# Patient Record
Sex: FEMALE | Race: WHITE | NOT HISPANIC OR LATINO | Employment: OTHER | URBAN - METROPOLITAN AREA
[De-identification: names, ages, dates, MRNs, and addresses within clinical notes are randomized per-mention and may not be internally consistent; named-entity substitution may affect disease eponyms.]

---

## 2017-01-05 ENCOUNTER — LAB VISIT (OUTPATIENT)
Dept: LAB | Facility: HOSPITAL | Age: 52
End: 2017-01-05
Attending: INTERNAL MEDICINE
Payer: COMMERCIAL

## 2017-01-05 DIAGNOSIS — M32.9 SLE (SYSTEMIC LUPUS ERYTHEMATOSUS): ICD-10-CM

## 2017-01-05 LAB
ALBUMIN SERPL BCP-MCNC: 3.6 G/DL
ALP SERPL-CCNC: 76 U/L
ALT SERPL W/O P-5'-P-CCNC: 24 U/L
ANION GAP SERPL CALC-SCNC: 12 MMOL/L
AST SERPL-CCNC: 23 U/L
BASOPHILS # BLD AUTO: 0.02 K/UL
BASOPHILS NFR BLD: 0.6 %
BILIRUB SERPL-MCNC: 0.5 MG/DL
BUN SERPL-MCNC: 19 MG/DL
C3 SERPL-MCNC: 106 MG/DL
C4 SERPL-MCNC: 12 MG/DL
CALCIUM SERPL-MCNC: 9 MG/DL
CHLORIDE SERPL-SCNC: 102 MMOL/L
CO2 SERPL-SCNC: 26 MMOL/L
CREAT SERPL-MCNC: 0.9 MG/DL
CRP SERPL-MCNC: 1.5 MG/L
DIFFERENTIAL METHOD: ABNORMAL
EOSINOPHIL # BLD AUTO: 0 K/UL
EOSINOPHIL NFR BLD: 0.3 %
ERYTHROCYTE [DISTWIDTH] IN BLOOD BY AUTOMATED COUNT: 13.4 %
ERYTHROCYTE [SEDIMENTATION RATE] IN BLOOD BY WESTERGREN METHOD: 60 MM/HR
EST. GFR  (AFRICAN AMERICAN): >60 ML/MIN/1.73 M^2
EST. GFR  (NON AFRICAN AMERICAN): >60 ML/MIN/1.73 M^2
GLUCOSE SERPL-MCNC: 103 MG/DL
HCT VFR BLD AUTO: 38.4 %
HGB BLD-MCNC: 13.5 G/DL
LYMPHOCYTES # BLD AUTO: 0.9 K/UL
LYMPHOCYTES NFR BLD: 27.3 %
MCH RBC QN AUTO: 31.7 PG
MCHC RBC AUTO-ENTMCNC: 35.2 %
MCV RBC AUTO: 90 FL
MONOCYTES # BLD AUTO: 0.4 K/UL
MONOCYTES NFR BLD: 12.6 %
NEUTROPHILS # BLD AUTO: 1.9 K/UL
NEUTROPHILS NFR BLD: 58.9 %
PLATELET # BLD AUTO: 201 K/UL
PMV BLD AUTO: 10.3 FL
POTASSIUM SERPL-SCNC: 3.7 MMOL/L
PROT SERPL-MCNC: 7.4 G/DL
RBC # BLD AUTO: 4.26 M/UL
SODIUM SERPL-SCNC: 140 MMOL/L
WBC # BLD AUTO: 3.26 K/UL

## 2017-01-05 PROCEDURE — 86140 C-REACTIVE PROTEIN: CPT

## 2017-01-05 PROCEDURE — 85651 RBC SED RATE NONAUTOMATED: CPT

## 2017-01-05 PROCEDURE — 36415 COLL VENOUS BLD VENIPUNCTURE: CPT | Mod: PO

## 2017-01-05 PROCEDURE — 86160 COMPLEMENT ANTIGEN: CPT

## 2017-01-05 PROCEDURE — 85025 COMPLETE CBC W/AUTO DIFF WBC: CPT

## 2017-01-05 PROCEDURE — 86160 COMPLEMENT ANTIGEN: CPT | Mod: 59

## 2017-01-05 PROCEDURE — 80053 COMPREHEN METABOLIC PANEL: CPT

## 2017-01-05 PROCEDURE — 86225 DNA ANTIBODY NATIVE: CPT

## 2017-01-06 LAB — DSDNA AB SER-ACNC: NORMAL [IU]/ML

## 2017-01-10 ENCOUNTER — OFFICE VISIT (OUTPATIENT)
Dept: RHEUMATOLOGY | Facility: CLINIC | Age: 52
End: 2017-01-10
Payer: COMMERCIAL

## 2017-01-10 ENCOUNTER — OFFICE VISIT (OUTPATIENT)
Dept: DERMATOLOGY | Facility: CLINIC | Age: 52
End: 2017-01-10
Payer: COMMERCIAL

## 2017-01-10 ENCOUNTER — CLINICAL SUPPORT (OUTPATIENT)
Dept: INFECTIOUS DISEASES | Facility: CLINIC | Age: 52
End: 2017-01-10
Payer: COMMERCIAL

## 2017-01-10 VITALS
SYSTOLIC BLOOD PRESSURE: 93 MMHG | HEART RATE: 61 BPM | WEIGHT: 128 LBS | BODY MASS INDEX: 21.85 KG/M2 | HEIGHT: 64 IN | DIASTOLIC BLOOD PRESSURE: 60 MMHG

## 2017-01-10 DIAGNOSIS — L71.9 ROSACEA: Primary | ICD-10-CM

## 2017-01-10 DIAGNOSIS — M32.9 SYSTEMIC LUPUS ERYTHEMATOSUS, UNSPECIFIED SLE TYPE, UNSPECIFIED ORGAN INVOLVEMENT STATUS: ICD-10-CM

## 2017-01-10 DIAGNOSIS — L90.5 SCAR: ICD-10-CM

## 2017-01-10 DIAGNOSIS — Z79.631 METHOTREXATE, LONG TERM, CURRENT USE: ICD-10-CM

## 2017-01-10 DIAGNOSIS — E78.5 HYPERLIPIDEMIA, UNSPECIFIED HYPERLIPIDEMIA TYPE: Primary | ICD-10-CM

## 2017-01-10 DIAGNOSIS — M32.8 OTHER FORMS OF SYSTEMIC LUPUS ERYTHEMATOSUS, UNSPECIFIED ORGAN INVOLVEMENT STATUS: ICD-10-CM

## 2017-01-10 DIAGNOSIS — D48.5 NEOPLASM OF UNCERTAIN BEHAVIOR OF SKIN: ICD-10-CM

## 2017-01-10 PROCEDURE — 99214 OFFICE O/P EST MOD 30 MIN: CPT | Mod: S$GLB,,, | Performed by: INTERNAL MEDICINE

## 2017-01-10 PROCEDURE — 99999 PR PBB SHADOW E&M-EST. PATIENT-LVL III: CPT | Mod: PBBFAC,,, | Performed by: INTERNAL MEDICINE

## 2017-01-10 PROCEDURE — 90732 PPSV23 VACC 2 YRS+ SUBQ/IM: CPT | Mod: S$GLB,,, | Performed by: INTERNAL MEDICINE

## 2017-01-10 PROCEDURE — 99213 OFFICE O/P EST LOW 20 MIN: CPT | Mod: S$GLB,,, | Performed by: DERMATOLOGY

## 2017-01-10 PROCEDURE — 99999 PR PBB SHADOW E&M-EST. PATIENT-LVL I: CPT | Mod: PBBFAC,,,

## 2017-01-10 PROCEDURE — 1159F MED LIST DOCD IN RCRD: CPT | Mod: S$GLB,,, | Performed by: DERMATOLOGY

## 2017-01-10 PROCEDURE — 1159F MED LIST DOCD IN RCRD: CPT | Mod: S$GLB,,, | Performed by: INTERNAL MEDICINE

## 2017-01-10 PROCEDURE — 90686 IIV4 VACC NO PRSV 0.5 ML IM: CPT | Mod: S$GLB,,, | Performed by: INTERNAL MEDICINE

## 2017-01-10 PROCEDURE — 90471 IMMUNIZATION ADMIN: CPT | Mod: S$GLB,,, | Performed by: INTERNAL MEDICINE

## 2017-01-10 PROCEDURE — 90472 IMMUNIZATION ADMIN EACH ADD: CPT | Mod: S$GLB,,, | Performed by: INTERNAL MEDICINE

## 2017-01-10 PROCEDURE — 99999 PR PBB SHADOW E&M-EST. PATIENT-LVL III: CPT | Mod: PBBFAC,,, | Performed by: DERMATOLOGY

## 2017-01-10 RX ORDER — FOLIC ACID 1 MG/1
2 TABLET ORAL DAILY
Qty: 60 TABLET | Refills: 11 | Status: SHIPPED | OUTPATIENT
Start: 2017-01-10 | End: 2017-10-17

## 2017-01-10 RX ORDER — METHOTREXATE 2.5 MG/1
TABLET ORAL
Qty: 48 TABLET | Refills: 0 | Status: SHIPPED | OUTPATIENT
Start: 2017-01-10 | End: 2017-06-25 | Stop reason: SDUPTHER

## 2017-01-10 RX ORDER — SULFACETAMIDE SODIUM, SULFUR 98; 48 MG/G; MG/G
1 CREAM TOPICAL DAILY
Qty: 57 G | Refills: 1 | Status: SHIPPED | OUTPATIENT
Start: 2017-01-10 | End: 2018-01-26

## 2017-01-10 RX ORDER — METRONIDAZOLE 7.5 MG/G
CREAM TOPICAL
Refills: 2 | COMMUNITY
Start: 2016-12-07 | End: 2019-01-24

## 2017-01-10 RX ORDER — DOXYCYCLINE HYCLATE 50 MG/1
50 CAPSULE ORAL DAILY
Qty: 30 CAPSULE | Refills: 1 | Status: SHIPPED | OUTPATIENT
Start: 2017-01-10 | End: 2017-05-17 | Stop reason: SDUPTHER

## 2017-01-10 ASSESSMENT — ROUTINE ASSESSMENT OF PATIENT INDEX DATA (RAPID3)
PAIN SCORE: 0
PATIENT GLOBAL ASSESSMENT SCORE: 0
FATIGUE SCORE: 0
MDHAQ FUNCTION SCORE: 0
TOTAL RAPID3 SCORE: 0
PSYCHOLOGICAL DISTRESS SCORE: 0
AM STIFFNESS SCORE: 0, NO

## 2017-01-10 ASSESSMENT — SYSTEMIC LUPUS ERYTHEMATOSUS DISEASE ACTIVITY INDEX (SLEDAI): TOTAL_SCORE: 0

## 2017-01-10 NOTE — MR AVS SNAPSHOT
[facility] - Dermatology  [address] [facility] [place] [address]  Phone: [phone]  Fax: [phone]                  Madeleine Nunez   1/10/2017 11:30 AM   Office Visit    Descripción:  Female : 1965   Personal Médico:  Ivonne Thomas MD   Departamento:  [facility] - Dermatology           Razón de la janee     Rosacea     Rash           Diagnósticos de Esta Visita        Comentarios    Rosacea    -  Primario     Neoplasm of uncertain behavior of skin                Lista de tareas           Citas próximas        Personal Médico Departamento Tfno del dpto    1/10/2017 1:20 PM INJECTION, INFECTIOUS DISEASES [facility] ID Injection Room [phone]    1/10/2017 1:40 PM INJECTION, INFECTIOUS DISEASES [facility] ID Injection Room [phone]    2017 7:45 AM Sea Rosen MD [facility] OB/ -[phone]    2/10/2017 8:10 AM [facility] [place] [facility] [place] [phone]    2017 10:30 AM Ivonne Thomas MD [facility] Dermatology [phone]      Metas (5 Years of Data)     Ninguna      Follow-Up and Disposition     Return in about 6 weeks (around 2017).      Recetas para recoger        Disp Refills Start End    sulfacetamide sodium-sulfur 9.8-4.8 % Crea 57 g 1 1/10/2017     Apply 1 inch topically once daily. - Topical (Top)    [facility]: [id number] [place] [address] AT [facility] No. de tlfo: #: [phone]       doxycycline (VIBRAMYCIN) 50 MG capsule 30 capsule 1 1/10/2017     Take 1 capsule (50 mg total) by mouth once daily. - Oral    [id number] [place] [address] AT [facility] No. de tlfo: #: [phone]         [facility] en [facility]     [facility] [place] [place] de Enfermeras - Asistencia   Enfermeras registradas de [facility] pueden ayudarle a reservar franky janee, proveer educación para la yanni, asesoría clínica, y otros servicios de asesoramiento.   Llame para jorge luis servicio gratuito a  [phone].             Medicamentos           Mensaje sobre Medicamentos     Verificar los cambios y / o adiciones a santana régimen de medicación son los mismos que discutir con santana médico. Si cualquiera de estos cambios o adiciones son incorrectos, por favor notifique a santana proveedor de atención médica.        EMPEZAR a tenzin estos medicamentos NUEVOS        Refills    sulfacetamide sodium-sulfur 9.8-4.8 % Crea 1    Sig: Apply 1 inch topically once daily.    Categoría: Normal    Vía: Topical (Top)    doxycycline (VIBRAMYCIN) 50 MG capsule 1    Sig: Take 1 capsule (50 mg total) by mouth once daily.    Categoría: Normal    Vía: Oral           Verifique que la siguiente lista de medicamentos es franky representación exacta de los medicamentos que está tomando actualmente. Si no hay ningunos reportados, la lista puede estar en knutson. Si no es correcta, por favor póngase en contacto con santana proveedor de atención médica. Lleve esta lista con usted en alexandria de emergencia.           Medicamentos Actuales     azelastine (ASTELIN) 137 mcg (0.1 %) nasal spray 2 sprays by Nasal route 2 (two) times daily as needed.     estrogen, conjugated,-medroxyprogesterone 0.3-1.5 mg (PREMPRO) 0.3-1.5 mg per tablet Take 1 tablet by mouth once daily.    fluocinolone acetonide oil (DERMOTIC OIL) 0.01 % Drop Place 5 drops in ear(s) daily as needed.     fluticasone (FLONASE) 50 mcg/actuation nasal spray 2 sprays by Each Nare route once daily.    folic acid (FOLVITE) 1 MG tablet Take 2 tablets (2 mg total) by mouth once daily.    ipratropium (ATROVENT) 0.06 % nasal spray 2 sprays by Nasal route 3 (three) times daily.    levothyroxine (SYNTHROID) 75 MCG tablet Take 75 mcg by mouth once daily.    methotrexate 2.5 MG Tab TAKE 4 TABLETS (12.5 MG TOTAL) BY MOUTH EVERY 7 DAYS.    nitrofurantoin (MACRODANTIN) 50 MG capsule every other day.     triamterene-hydrochlorothiazide 50-25 mg (DYAZIDE) 50-25 mg Cap Take 1 capsule by mouth once daily.    doxycycline  (VIBRAMYCIN) 50 MG capsule Take 1 capsule (50 mg total) by mouth once daily.    metronidazole 0.75% (METROCREAM) 0.75 % Crea APPLY TOPICALLY TO FACE TWICE DAILY    potassium citrate (UROCIT-K) 10 mEq TbSR TAKE 2 TABLETS (20 MEQ TOTAL) BY MOUTH ONCE DAILY.    sulfacetamide sodium-sulfur 9.8-4.8 % Crea Apply 1 inch topically once daily.           Información de referencia clínica           Signos vitales - más recientes     LMP (última renetta)                   2013           Alergias     A partir del:  1/10/2017        Sulfa (Sulfonamide Antibiotics)    Sulfamethoxazole-trimethoprim      Vacunas     Administradas en la fecha de la visita:  1/10/2017        Nombre Fecha Dosis Fecha del VIS Vía    Pneumococcal Polysaccharide - 23 Valent  Deferred   -- -- --    influenza - Quadrivalent - PF (ADULT)  Deferred   -- -- --               Madeleine Nunez   1/10/2017 11:30 AM   Office Visit    Description:  Female : 1965   Provider:  Ivonne Thomas MD   Department:  [facility] - Dermatology           Reason for Visit     Rosacea     Rash           Diagnoses this Visit        Comments    Rosacea    -  Primary     Neoplasm of uncertain behavior of skin                To Do List           Future Appointments        Provider Department Dept Phone    1/10/2017 1:20 PM INJECTION, INFECTIOUS DISEASES [facility] Injection Room [phone]    1/10/2017 1:40 PM INJECTION, INFECTIOUS DISEASES [facility] Injection Room [phone]    2017 7:45 AM Sea Rosen MD [facility] OB/ -[phone]    2/10/2017 8:10 AM [facility] [place] [facility] [place] [phone]    2017 10:30 AM [facility] [phone]      Goals     None      Follow-Up and Disposition     Return in about 6 weeks (around 2017).       These Medications        Disp Refills Start End    sulfacetamide sodium-sulfur 9.8-4.8 % Crea 57 g 1 1/10/2017     Apply 1 inch topically once  daily. - Topical (Top)    Pharmacy: [id number] - [place] - [address] [facility] Ph #: [phone]       doxycycline (VIBRAMYCIN) 50 MG capsule 30 capsule 1 1/10/2017     Take 1 capsule (50 mg total) by mouth once daily. - Oral    Pharmacy: [id number] - [place] - [address] [facility] Ph #: [phone]         [facility] On [facility]     [facility] On Call Nurse Care Line - 24/7 Assistance  Registered nurses in the [facility] On Call Center provide clinical advisement, health education, appointment booking, and other advisory services.  Call for this free service at [phone].             Medications           Message regarding Medications     Verify the changes and/or additions to your medication regime listed below are the same as discussed with your clinician today.  If any of these changes or additions are incorrect, please notify your healthcare provider.        START taking these NEW medications        Refills    sulfacetamide sodium-sulfur 9.8-4.8 % Crea 1    Sig: Apply 1 inch topically once daily.    Class: Normal    Route: Topical (Top)    doxycycline (VIBRAMYCIN) 50 MG capsule 1    Sig: Take 1 capsule (50 mg total) by mouth once daily.    Class: Normal    Route: Oral           Verify that the below list of medications is an accurate representation of the medications you are currently taking.  If none reported, the list may be blank. If incorrect, please contact your healthcare provider. Carry this list with you in case of emergency.           Current Medications     azelastine (ASTELIN) 137 mcg (0.1 %) nasal spray 2 sprays by Nasal route 2 (two) times daily as needed.     estrogen, conjugated,-medroxyprogesterone 0.3-1.5 mg (PREMPRO) 0.3-1.5 mg per tablet Take 1 tablet by mouth once daily.    fluocinolone acetonide oil (DERMOTIC OIL) 0.01 % Drop Place 5 drops in ear(s) daily as needed.     fluticasone (FLONASE) 50 mcg/actuation nasal spray 2 sprays by Each  Nare route once daily.    folic acid (FOLVITE) 1 MG tablet Take 2 tablets (2 mg total) by mouth once daily.    ipratropium (ATROVENT) 0.06 % nasal spray 2 sprays by Nasal route 3 (three) times daily.    levothyroxine (SYNTHROID) 75 MCG tablet Take 75 mcg by mouth once daily.    methotrexate 2.5 MG Tab TAKE 4 TABLETS (12.5 MG TOTAL) BY MOUTH EVERY 7 DAYS.    nitrofurantoin (MACRODANTIN) 50 MG capsule every other day.     triamterene-hydrochlorothiazide 50-25 mg (DYAZIDE) 50-25 mg Cap Take 1 capsule by mouth once daily.    doxycycline (VIBRAMYCIN) 50 MG capsule Take 1 capsule (50 mg total) by mouth once daily.    metronidazole 0.75% (METROCREAM) 0.75 % Crea APPLY TOPICALLY TO FACE TWICE DAILY    potassium citrate (UROCIT-K) 10 mEq TbSR TAKE 2 TABLETS (20 MEQ TOTAL) BY MOUTH ONCE DAILY.    sulfacetamide sodium-sulfur 9.8-4.8 % Crea Apply 1 inch topically once daily.           Clinical Reference Information           Vital Signs - Last Recorded     LMP                   08/18/2013           Allergies as of 1/10/2017     Sulfa (Sulfonamide Antibiotics)    Sulfamethoxazole-trimethoprim      Immunizations Administered on Date of Encounter - 1/10/2017     Name Date Dose VIS Date Route    Pneumococcal Polysaccharide - 23 Valent  Deferred   -- -- --    influenza - Quadrivalent - PF (ADULT)  Deferred   -- -- --

## 2017-01-10 NOTE — PROGRESS NOTES
"Subjective:       Patient ID: Madeleine Nunez is a 51 y.o. female.    Chief Complaint: SLE    HPI asymptomatic. SLEDAI=0. Has occ palpitations, saw Dr. Sifuentes rec beta blocker she deferred. Exercises vigorously 5/7 days without difficulty. Didn't get flu vaccine this year. Seeing her gyn, had cervicitis which was biopsied. May explain the asymptomatic pyuria, though pt doesn't note vag discharge. No urinary symptoms whatsoever.  Review of Systems   Constitutional: Negative for appetite change, fatigue, fever and unexpected weight change.   HENT: Negative for mouth sores.    Eyes: Negative for visual disturbance.   Respiratory: Negative for cough, shortness of breath and wheezing.    Cardiovascular: Negative for chest pain and palpitations.   Gastrointestinal: Negative for abdominal pain, anal bleeding, blood in stool, constipation, diarrhea, nausea and vomiting.   Genitourinary: Negative for dysuria, frequency and urgency.   Musculoskeletal: Negative for arthralgias, back pain, gait problem, joint swelling, myalgias, neck pain and neck stiffness.   Skin: Negative for rash.   Neurological: Negative for weakness, numbness and headaches.   Hematological: Negative for adenopathy. Does not bruise/bleed easily.   Psychiatric/Behavioral: Negative for sleep disturbance. The patient is not nervous/anxious.          Objective:     Visit Vitals    BP 93/60    Pulse 61    Ht 5' 3.6" (1.615 m)    Wt 58.1 kg (128 lb)    LMP 08/18/2013    BMI 22.25 kg/m2        Physical Exam   Constitutional: She is oriented to person, place, and time and well-developed, well-nourished, and in no distress.   HENT:   Head: Normocephalic and atraumatic.   Mouth/Throat: Oropharynx is clear and moist.   Eyes: Conjunctivae and EOM are normal.   Neck: Normal range of motion. Neck supple. No thyromegaly present.   Cardiovascular: Normal rate, regular rhythm, normal heart sounds and intact distal pulses.  Exam reveals no gallop and no friction rub.  "   No murmur heard.  Pulmonary/Chest: Breath sounds normal. She has no wheezes. She has no rales. She exhibits no tenderness.   Abdominal: Soft. She exhibits no distension and no mass. There is no tenderness.       Right Side Rheumatological Exam     Examination finds the shoulder, elbow, wrist, knee, 1st PIP, 1st MCP, 2nd PIP, 2nd MCP, 3rd PIP, 3rd MCP, 4th PIP, 4th MCP, 5th PIP and 5th MCP normal.    Left Side Rheumatological Exam     Examination finds the shoulder, elbow, wrist, knee, 1st PIP, 1st MCP, 2nd PIP, 2nd MCP, 3rd PIP, 3rd MCP, 4th PIP, 4th MCP, 5th PIP and 5th MCP normal.      Lymphadenopathy:     She has no cervical adenopathy.   Neurological: She is alert and oriented to person, place, and time. She displays normal reflexes. Gait normal.   Nl motor strength UE and LE bilateral   Musculoskeletal: She exhibits no edema.       Results for HUBERT RODRIGUEZ (MRN [id number]) as of 1/10/2017 11:08   Ref. Range 1/5/2017 13:29 1/5/2017 13:40   WBC Latest Ref Range: 3.90 - 12.70 K/uL  3.26 (L)   RBC Latest Ref Range: 4.00 - 5.40 M/uL  4.26   Hemoglobin Latest Ref Range: 12.0 - 16.0 g/dL  13.5   Hematocrit Latest Ref Range: 37.0 - 48.5 %  38.4   MCV Latest Ref Range: 82 - 98 fL  90   MCH Latest Ref Range: 27.0 - 31.0 pg  31.7 (H)   MCHC Latest Ref Range: 32.0 - 36.0 %  35.2   RDW Latest Ref Range: 11.5 - 14.5 %  13.4   Platelets Latest Ref Range: 150 - 350 K/uL  201   MPV Latest Ref Range: 9.2 - 12.9 fL  10.3   Gran% Latest Ref Range: 38.0 - 73.0 %  58.9   Gran # Latest Ref Range: 1.8 - 7.7 K/uL  1.9   Lymph% Latest Ref Range: 18.0 - 48.0 %  27.3   Lymph # Latest Ref Range: 1.0 - 4.8 K/uL  0.9 (L)   Mono% Latest Ref Range: 4.0 - 15.0 %  12.6   Mono # Latest Ref Range: 0.3 - 1.0 K/uL  0.4   Eosinophil% Latest Ref Range: 0.0 - 8.0 %  0.3   Eos # Latest Ref Range: 0.0 - 0.5 K/uL  0.0   Basophil% Latest Ref Range: 0.0 - 1.9 %  0.6   Baso # Latest Ref Range: 0.00 - 0.20 K/uL  0.02   Sed Rate Latest Ref Range: 0 - 20  mm/Hr  60 (H)   Sodium Latest Ref Range: 136 - 145 mmol/L  140   Potassium Latest Ref Range: 3.5 - 5.1 mmol/L  3.7   Chloride Latest Ref Range: 95 - 110 mmol/L  102   CO2 Latest Ref Range: 23 - 29 mmol/L  26   Anion Gap Latest Ref Range: 8 - 16 mmol/L  12   BUN, Bld Latest Ref Range: 6 - 20 mg/dL  19   Creatinine Latest Ref Range: 0.5 - 1.4 mg/dL  0.9   eGFR if non African American Latest Ref Range: >60 mL/min/1.73 m^2  >60.0   eGFR if African American Latest Ref Range: >60 mL/min/1.73 m^2  >60.0   Glucose Latest Ref Range: 70 - 110 mg/dL  103   Calcium Latest Ref Range: 8.7 - 10.5 mg/dL  9.0   Alkaline Phosphatase Latest Ref Range: 55 - 135 U/L  76   Total Protein Latest Ref Range: 6.0 - 8.4 g/dL  7.4   Albumin Latest Ref Range: 3.5 - 5.2 g/dL  3.6   Total Bilirubin Latest Ref Range: 0.1 - 1.0 mg/dL  0.5   AST Latest Ref Range: 10 - 40 U/L  23   ALT Latest Ref Range: 10 - 44 U/L  24   CRP Latest Ref Range: 0.0 - 8.2 mg/L  1.5   ds DNA Ab Latest Ref Range: Negative 1:10   Negative 1:10   Complement (C-3) Latest Ref Range: 50 - 180 mg/dL  106   Complement (C-4) Latest Ref Range: 11 - 44 mg/dL  12   Specimen UA Unknown Urine, Clean Catch    Color, UA Latest Ref Range: Yellow, Straw, Yvonne  Yellow    pH, UA Latest Ref Range: 5.0 - 8.0  7.0    Specific Gravity, UA Latest Ref Range: 1.005 - 1.030  1.020    Appearance, UA Latest Ref Range: Clear  Clear    Protein, UA Latest Ref Range: Negative  Trace (A)    Glucose, UA Latest Ref Range: Negative  Negative    Ketones, UA Latest Ref Range: Negative  Negative    Occult Blood UA Latest Ref Range: Negative  Negative    Nitrite, UA Latest Ref Range: Negative  Negative    Urobilinogen, UA Latest Ref Range: <2.0 EU/dL Negative    Bilirubin (UA) Latest Ref Range: Negative  Negative    Leukocytes, UA Latest Ref Range: Negative  3+ (A)    RBC, UA Latest Ref Range: 0 - 4 /hpf 1    WBC, UA Latest Ref Range: 0 - 5 /hpf 18 (H)    Bacteria, UA Latest Ref Range: None-Occ /hpf Rare     Squam Epithel, UA Latest Units: /hpf 6    Microscopic Comment Unknown SEE COMMENT    Prot/Creat Ratio, Ur Latest Ref Range: 0.00 - 0.20  0.07    Protein, Urine Random Latest Ref Range: 0 - 15 mg/dL 13    Creatinine, Random Ur Latest Ref Range: 15.0 - 325.0 mg/dL 199.0    Differential Method Unknown  Automated     The bone density is normal. RJQ   External Result Report   External Result Report   Narrative   : 1965 ORDERING PHYSICIAN: ANTONIA Bender LOCATION: [facility]    HISTORY: 49 y/o female with no hx of fractures. She had menopausal symptoms at 47 y/o. She is taking Estrogen and 400 units of Vit D supplements. She has a hx of Kidney Stones and Lupus. She exercises daily and does not smoke.    TECHNIQUE: Bone Mineral Density performed using [employer] [place] (S/N [id number]) reveals good positioning of lumbar spine and hip.    BONE MINERAL DENSITY RESULTS:  Lumbar Spine: Lumbar bone mineral density L1-L4 is 0.982g/cm2, which is a t-score of -0.6. The z-score is 0.1.    Total Hip: The total hip bone mineral density is 1.012g/cm2.  The t-score is 0.6, and the z-score is 1.0.  Femoral neck BMD is 0.822g/cm2 and the t-score is -0.2.    COMPARISONS:  Date Location BMD T-score  12 L-spine 0.974 -0.7  Total Hip 1.015 0.6   Impression     Normal spine and hip BMD. Total hip and lumbar spine BMD unchanged since  and .     Recommendations:  1) Adequate calcium and Vitamin D therapy  2) Appropriate exercise  3) Weigh benefits and risks of homonal therapy.  4) Consider repeat BMD in perhaps 4 years.      EXPLANATION OF RESULTS:  The t-score compares this results to the bone density of a 25 year old of the same gender. The z-score compares this result to the average bone density to people of the same age and gender. The amounts indicate the number of standard deviations above or below the mean.  * Osteoporosis is generally defined as having a t-score between less than -2.5.  * Osteopenia is generally  defined as having a t-score between -1 and -2.5.  * The normal range is generally defined as having a t-score between -1 to 1.      Electronically signed by: MARQUEZ LOPEZ MD  Date: 10/13/15  Time: 14:16     Encounter   View Encounter      Reviewed By   Wander Bender MD on 10/13/2015  3:23 PM     Results for HUBERT RODRIGUEZ (MRN [id number]) as of 1/10/2017 11:08   Ref. Range 4/16/2015 08:45   Cholesterol Latest Ref Range: 120 - 199 mg/dL 129   HDL Latest Ref Range: 40 - 75 mg/dL 38 (L)   LDL Cholesterol Latest Ref Range: 63.0 - 159.0 mg/dL 83.6   Total Cholesterol/HDL Ratio Latest Ref Range: 2.0 - 5.0  3.4   Triglycerides Latest Ref Range: 30 - 150 mg/dL 37      Results for HUBERT RODRIGUEZ (MRN [id number]) as of 1/10/2017 11:28   Ref. Range 7/7/2016 11:59   Mitogen - Nil Latest Ref Range: See text IU/mL >10.00   NIL Latest Ref Range: See text IU/mL 0.05   TB Antigen Latest Ref Range: See text IU/mL 0.18   TB Antigen - Nil Latest Ref Range: See text IU/mL 0.12   TB Gold Unknown Negative     Assessment:     SLE SLEDAI=0  Asymptomatic sterile pyuria ? Due to cervicitis  Lymphopenia, mild   Meniere's  hypotension       Plan:   Decrease methotrexate to 10mg once a week with folic acid 2mg daily  Repeat cbc, lipid panel in 4 wks  *influenza vaccine and Pneumovax today  Should speak with her ENT about reducing dose of Dyazide perhaps to 50 /12.5mg triam/ HCTZ  RTC 6 months with standing lupus labs

## 2017-01-10 NOTE — MR AVS SNAPSHOT
[facility] - Rheumatology  [address]  [facility] [address]  Phone: [phone]  Fax: [phone]                  Madeleine CROWELL Enrique   1/10/2017 10:30 AM   Office Visit    Descripción:  Female : 1965   Personal Médico:  Wander Bender MD   [facility]           Diagnósticos de Esta Visita        Comentarios    Hyperlipidemia, unspecified hyperlipidemia type    -  Primario     Systemic lupus erythematosus, unspecified SLE type, unspecified organ involvement status         Other forms of systemic lupus erythematosus, unspecified organ involvement status         Methotrexate, long term, current use                Lista de tareas           Citas próximas        Personal Médico Departamento Tfno del dpto    1/10/2017 11:30 AM Ivonne Thomas MD [facility] - Dermatology [phone]    2017 7:45 AM Sea Rosen MD [facility] - OB/ -[phone]      Metas (5 Years of Data)     Ninguna      Follow-Up and Disposition     Return in about 3 months (around 4/10/2017).      Recetas para recoger        Disp Refills Start End    methotrexate 2.5 MG Tab 48 tablet 0 1/10/2017     TAKE 4 TABLETS (12.5 MG TOTAL) BY MOUTH EVERY 7 DAYS.    [facility]: [facility]/pharmacy #[id number] - [place] - [address] AT [facility] No. de tlfo: #: [phone]       folic acid (FOLVITE) 1 MG tablet 60 tablet 11 1/10/2017     Take 2 tablets (2 mg total) by mouth once daily. - Oral    [facility]: [facility]/pharmacy #[id number] - [place] - [address] AT [facility] No. de tlfo: #: [phone]         [facility] en Llamada     [facility] [place] Línea de Enfermeras - Asistencia   Enfermeras registradas de [facility] pueden ayudarle a reservar franky janee, proveer educación para la yanni, asesoría clínica, y otros servicios de asesoramiento.   Llame para jorge luis servicio gratuito a [phone].             Medicamentos           Mensaje sobre Medicamentos     Verificar los cambios y / o  adiciones a santana régimen de medicación son los mismos que discutir con santana médico. Si cualquiera de estos cambios o adiciones son incorrectos, por favor notifique a santana proveedor de atención médica.        CAMBIAR la forma en que está tomando estos medicamentos     Start Taking Instead of    methotrexate 2.5 MG Tab methotrexate 2.5 MG Tab    Dosage:  TAKE 4 TABLETS (12.5 MG TOTAL) BY MOUTH EVERY 7 DAYS. Dosage:  TAKE 5 TABLETS (12.5 MG TOTAL) BY MOUTH EVERY 7 DAYS.    Reason for Change:  Reorder            Verifique que la siguiente lista de medicamentos es franky representación exacta de los medicamentos que está tomando actualmente. Si no hay ningunos reportados, la lista puede estar en knutson. Si no es correcta, por favor póngase en contacto con santana proveedor de atención médica. Lleve esta lista con usted en alexandria de emergencia.           Medicamentos Actuales     azelastine (ASTELIN) 137 mcg (0.1 %) nasal spray 2 sprays by Nasal route 2 (two) times daily as needed.     estrogen, conjugated,-medroxyprogesterone 0.3-1.5 mg (PREMPRO) 0.3-1.5 mg per tablet Take 1 tablet by mouth once daily.    fluocinolone acetonide oil (DERMOTIC OIL) 0.01 % Drop Place 5 drops in ear(s) daily as needed.     fluticasone (FLONASE) 50 mcg/actuation nasal spray 2 sprays by Each Nare route once daily.    folic acid (FOLVITE) 1 MG tablet Take 2 tablets (2 mg total) by mouth once daily.    ipratropium (ATROVENT) 0.06 % nasal spray 2 sprays by Nasal route 3 (three) times daily.    levothyroxine (SYNTHROID) 75 MCG tablet Take 75 mcg by mouth once daily.    methotrexate 2.5 MG Tab TAKE 4 TABLETS (12.5 MG TOTAL) BY MOUTH EVERY 7 DAYS.    nitrofurantoin (MACRODANTIN) 50 MG capsule every other day.     triamterene-hydrochlorothiazide 50-25 mg (DYAZIDE) 50-25 mg Cap Take 1 capsule by mouth once daily.    potassium citrate (UROCIT-K) 10 mEq TbSR TAKE 2 TABLETS (20 MEQ TOTAL) BY MOUTH ONCE DAILY.           Información de referencia clínica           Signos  "vitales - más recientes  Última actualización: 1/10/2017 10:40 AM por Liv oMrrell MA    PS Pulso Patton Peso LMP (última renetta) BMI ([facility])    93/60 61 5' 3.6" (1.615 m) 58.1 kg (128 lb) 2013 22.25 kg/m2      Blood Pressure          Most Recent Value    BP  93/60      Alergias     A partir del:  1/10/2017        Sulfa (Sulfonamide Antibiotics)    Sulfamethoxazole-trimethoprim      Vacunas     Administradas en la fecha de la visita:  1/10/2017        Nombre Fecha Dosis Fecha del VIS Vía    Pneumococcal Polysaccharide - 23 Valent  Incomplete 0.5 mL 2015 Intramuscular    influenza - Quadrivalent - PF (ADULT)  Incomplete 0.5 mL 2015 Intramuscular      Orders Placed During Today's Visit      Órdenes normales de esta visita    Influenza - Quadrivalent (3 years & older) (PF)     Pneumococcal Polysaccharide Vaccine (23 Valent) (SQ/IM)     Exámenes/Procedimientos futuros Se espera el Vence    LIPID PANEL  1/10/2017 3/11/2018               Madeleine Nunez   1/10/2017 10:30 AM   Office Visit    Description:  Female : 1965   Provider:  Wander Bender MD   Department:  [facility] - Rheumatology           Diagnoses this Visit        Comments    Hyperlipidemia, unspecified hyperlipidemia type    -  Primary     Systemic lupus erythematosus, unspecified SLE type, unspecified organ involvement status         Other forms of systemic lupus erythematosus, unspecified organ involvement status         Methotrexate, long term, current use                To Do List           Future Appointments        Provider Department Dept Phone    1/10/2017 11:30 AM [facility] [phone]    2017 7:45 AM Sea Rosen MD [facility] - OB/ -[phone]      Goals     None      Follow-Up and Disposition     Return in about 3 months (around 4/10/2017).       These Medications        Disp Refills Start End    methotrexate 2.5 MG Tab 48 tablet 0 1/10/2017     TAKE 4 TABLETS (12.5 MG " TOTAL) BY MOUTH EVERY 7 DAYS.    Pharmacy: [facility]/pharmacy #[id number] [place] - [address] [facility] Ph #: [phone]       folic acid (FOLVITE) 1 MG tablet 60 tablet 11 1/10/2017     Take 2 tablets (2 mg total) by mouth once daily. - Oral    Pharmacy: [facility]/pharmacy #[id number] - [place] - [address] [facility] Ph #: [phone]         [facility] On Call     [facility] On Call Nurse Care Line - 24/7 Assistance  Registered nurses in the [facility] On Call Center provide clinical advisement, health education, appointment booking, and other advisory services.  Call for this free service at [phone].             Medications           Message regarding Medications     Verify the changes and/or additions to your medication regime listed below are the same as discussed with your clinician today.  If any of these changes or additions are incorrect, please notify your healthcare provider.        CHANGE how you are taking these medications     Start Taking Instead of    methotrexate 2.5 MG Tab methotrexate 2.5 MG Tab    Dosage:  TAKE 4 TABLETS (12.5 MG TOTAL) BY MOUTH EVERY 7 DAYS. Dosage:  TAKE 5 TABLETS (12.5 MG TOTAL) BY MOUTH EVERY 7 DAYS.    Reason for Change:  Reorder            Verify that the below list of medications is an accurate representation of the medications you are currently taking.  If none reported, the list may be blank. If incorrect, please contact your healthcare provider. Carry this list with you in case of emergency.           Current Medications     azelastine (ASTELIN) 137 mcg (0.1 %) nasal spray 2 sprays by Nasal route 2 (two) times daily as needed.     estrogen, conjugated,-medroxyprogesterone 0.3-1.5 mg (PREMPRO) 0.3-1.5 mg per tablet Take 1 tablet by mouth once daily.    fluocinolone acetonide oil (DERMOTIC OIL) 0.01 % Drop Place 5 drops in ear(s) daily as needed.     fluticasone (FLONASE) 50 mcg/actuation nasal spray 2 sprays by Each Nare route once daily.    folic  "acid (FOLVITE) 1 MG tablet Take 2 tablets (2 mg total) by mouth once daily.    ipratropium (ATROVENT) 0.06 % nasal spray 2 sprays by Nasal route 3 (three) times daily.    levothyroxine (SYNTHROID) 75 MCG tablet Take 75 mcg by mouth once daily.    methotrexate 2.5 MG Tab TAKE 4 TABLETS (12.5 MG TOTAL) BY MOUTH EVERY 7 DAYS.    nitrofurantoin (MACRODANTIN) 50 MG capsule every other day.     triamterene-hydrochlorothiazide 50-25 mg (DYAZIDE) 50-25 mg Cap Take 1 capsule by mouth once daily.    potassium citrate (UROCIT-K) 10 mEq TbSR TAKE 2 TABLETS (20 MEQ TOTAL) BY MOUTH ONCE DAILY.           Clinical Reference Information           Vital Signs - Last Recorded  Most recent update: 1/10/2017 10:40 AM by Liv Morrell MA    BP Pulse Ht Wt LMP BMI    93/60 61 5' 3.6" (1.615 m) 58.1 kg (128 lb) 08/18/2013 22.25 kg/m2      Blood Pressure          Most Recent Value    BP  93/60      Allergies as of 1/10/2017     Sulfa (Sulfonamide Antibiotics)    Sulfamethoxazole-trimethoprim      Immunizations Administered on Date of Encounter - 1/10/2017     Name Date Dose VIS Date Route    Pneumococcal Polysaccharide - 23 Valent  Incomplete 0.5 mL 4/24/2015 Intramuscular    influenza - Quadrivalent - PF (ADULT)  Incomplete 0.5 mL 8/7/2015 Intramuscular      Orders Placed During Today's Visit      Normal Orders This Visit    Influenza - Quadrivalent (3 years & older) (PF)     Pneumococcal Polysaccharide Vaccine (23 Valent) (SQ/IM)     Future Labs/Procedures Expected by Expires    LIPID PANEL  1/10/2017 3/11/2018        "

## 2017-01-10 NOTE — PROGRESS NOTES
Subjective:       Patient ID:  Madeleine Nunez is a 51 y.o. female who presents for   Chief Complaint   Patient presents with    Rosacea     f/u no change with metrogel    Rash     R upper arm f/u     Rosacea  - Follow-up  Symptom course: unchanged  Currently using: metrogel.  Affected locations: face  Signs / symptoms: asymptomatic    Rash  - Follow-up  Symptom course: unchanged  Currently using: nothing.  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   Skin: Positive for rash and daily sunscreen use. Negative for recent sunburn.   Hematologic/Lymphatic: Does not bruise/bleed easily.        Objective:    Physical Exam   Constitutional: She appears well-developed and well-nourished. No distress.   Neurological: She is alert and oriented to person, place, and time. She is not disoriented.   Psychiatric: She has a normal mood and affect.   Skin:   Areas Examined (abnormalities noted in diagram):   Head / Face Inspection Performed  Neck Inspection Performed  Chest / Axilla Inspection Performed  RUE Inspected                   Diagram Legend     Erythematous scaling macule/papule c/w actinic keratosis       Vascular papule c/w angioma      Pigmented verrucoid papule/plaque c/w seborrheic keratosis      Yellow umbilicated papule c/w sebaceous hyperplasia      Irregularly shaped tan macule c/w lentigo     1-2 mm smooth white papules consistent with Milia      Movable subcutaneous cyst with punctum c/w epidermal inclusion cyst      Subcutaneous movable cyst c/w pilar cyst      Firm pink to brown papule c/w  dermatofibroma      Pedunculated fleshy papule(s) c/w skin tag(s)      Evenly pigmented macule c/w junctional nevus     Mildly variegated pigmented, slightly irregular-bordered macule c/w mildly atypical nevus      Flesh colored to evenly pigmented papule c/w intradermal nevus       Pink pearly papule/plaque c/w basal cell carcinoma      Erythematous hyperkeratotic cursted plaque c/w SCC      Surgical scar with no sign of skin cancer recurrence      Open and closed comedones      Inflammatory papules and pustules      Verrucoid papule consistent consistent with wart     Erythematous eczematous patches and plaques     Dystrophic onycholytic nail with subungual debris c/w onychomycosis     Umbilicated papule    Erythematous-base heme-crusted tan verrucoid plaque consistent with inflamed seborrheic keratosis     Erythematous Silvery Scaling Plaque c/w Psoriasis     See annotation    PATHOLOGY 9/7/16  Skin, right nasal tip, shave biopsy:  - CHRONIC PERIFOLLICULITIS WITH ADJACENT DILATED, TELANGIECTATIC BLOOD VESSELS,  SUGGESTIVE OF ROSACEA.    Assessment / Plan:        Rosacea  Pt stopped metronidazole cream bc she did not think it was helping.  Started using Prosacea (1% sulfur) and she thinks this helped decrease the redness  Will increase strength of topical and add low dose doxycycline   -     sulfacetamide sodium-sulfur 9.8-4.8 % Crea; Apply 1 inch topically once daily.  Dispense: 57 g; Refill: 1  -     doxycycline (VIBRAMYCIN) 50 MG capsule; Take 1 capsule (50 mg total) by mouth once daily.  Dispense: 30 capsule; Refill: 1    Neoplasm of uncertain behavior of skin  At last OV, I was considering morphea vs. scar vs. other (pt denied trauma to this location)  It decreaed in size from 2 cm to 5 mm.  Will continue to monitor    Lupus  Per Rheumatology, decrease methotrexate to 10mg once a week with folic acid 2mg daily    Scar   From Bx performed at OSH.  Pt may consider microdermabrasion in the future         Return  in about 6 weeks (around 2/21/2017).

## 2017-02-09 ENCOUNTER — LAB VISIT (OUTPATIENT)
Dept: LAB | Facility: HOSPITAL | Age: 52
End: 2017-02-09
Attending: INTERNAL MEDICINE
Payer: COMMERCIAL

## 2017-02-09 DIAGNOSIS — E78.5 HYPERLIPIDEMIA, UNSPECIFIED HYPERLIPIDEMIA TYPE: ICD-10-CM

## 2017-02-09 DIAGNOSIS — M32.9 SLE (SYSTEMIC LUPUS ERYTHEMATOSUS): ICD-10-CM

## 2017-02-09 LAB
BASOPHILS # BLD AUTO: 0.02 K/UL
BASOPHILS NFR BLD: 0.5 %
CHOLEST/HDLC SERPL: 3.1 {RATIO}
DIFFERENTIAL METHOD: ABNORMAL
EOSINOPHIL # BLD AUTO: 0 K/UL
EOSINOPHIL NFR BLD: 0.3 %
ERYTHROCYTE [DISTWIDTH] IN BLOOD BY AUTOMATED COUNT: 14 %
HCT VFR BLD AUTO: 40.4 %
HDL/CHOLESTEROL RATIO: 32.3 %
HDLC SERPL-MCNC: 161 MG/DL
HDLC SERPL-MCNC: 52 MG/DL
HGB BLD-MCNC: 13.4 G/DL
LDLC SERPL CALC-MCNC: 93.6 MG/DL
LYMPHOCYTES # BLD AUTO: 0.8 K/UL
LYMPHOCYTES NFR BLD: 20.4 %
MCH RBC QN AUTO: 31.7 PG
MCHC RBC AUTO-ENTMCNC: 33.2 %
MCV RBC AUTO: 96 FL
MONOCYTES # BLD AUTO: 0.4 K/UL
MONOCYTES NFR BLD: 11.2 %
NEUTROPHILS # BLD AUTO: 2.5 K/UL
NEUTROPHILS NFR BLD: 67.6 %
NONHDLC SERPL-MCNC: 109 MG/DL
PLATELET # BLD AUTO: 214 K/UL
PMV BLD AUTO: 10.3 FL
RBC # BLD AUTO: 4.23 M/UL
TRIGL SERPL-MCNC: 77 MG/DL
WBC # BLD AUTO: 3.67 K/UL

## 2017-02-09 PROCEDURE — 80061 LIPID PANEL: CPT

## 2017-02-09 PROCEDURE — 36415 COLL VENOUS BLD VENIPUNCTURE: CPT | Mod: PO

## 2017-02-09 PROCEDURE — 85025 COMPLETE CBC W/AUTO DIFF WBC: CPT

## 2017-02-23 ENCOUNTER — OFFICE VISIT (OUTPATIENT)
Dept: OBSTETRICS AND GYNECOLOGY | Facility: CLINIC | Age: 52
End: 2017-02-23
Payer: COMMERCIAL

## 2017-02-23 VITALS
HEIGHT: 63 IN | WEIGHT: 130.06 LBS | SYSTOLIC BLOOD PRESSURE: 98 MMHG | DIASTOLIC BLOOD PRESSURE: 62 MMHG | BODY MASS INDEX: 23.04 KG/M2

## 2017-02-23 DIAGNOSIS — R68.82 LOW LIBIDO: Primary | ICD-10-CM

## 2017-02-23 PROCEDURE — 1160F RVW MEDS BY RX/DR IN RCRD: CPT | Mod: S$GLB,,, | Performed by: OBSTETRICS & GYNECOLOGY

## 2017-02-23 PROCEDURE — 99213 OFFICE O/P EST LOW 20 MIN: CPT | Mod: S$GLB,,, | Performed by: OBSTETRICS & GYNECOLOGY

## 2017-02-23 PROCEDURE — 99999 PR PBB SHADOW E&M-EST. PATIENT-LVL III: CPT | Mod: PBBFAC,,, | Performed by: OBSTETRICS & GYNECOLOGY

## 2017-02-23 NOTE — PROGRESS NOTES
"Madeleine Nunez is a 52 y.o. postmenopuasal  who presents in f/u of "granulation tissue" of vagina and cervix   - bleeding has stopped since last tx    Path from 12/15/17:   1. CERVIX, 6 O'CLOCK, BIOPSY:  Minute fragment of granulation tissue  No evidence of malignancy  2. CERVIX, 7 O'CLOCK, BIOPSY:  Granulation tissue  No evidence of malignancy  3. ENDOCERVICAL CURRETAGE:  Mucus only  No epithelium present for evaluation    Talk re possible relation to Lupus - no other dermatologic    Patient's last menstrual period was 2013.     sexually active and mut monog - concerned re low libido     - no pain w sex, still in love, still enjoys sex - just no desire     - no interpersonal issues and this is not a problem for her or her     Talk re testosterone supplimentation and SE risk - not desired at presen    No hx of abnormal paps. Last pap 16      Past Medical History   Diagnosis Date    Allergy     Anemia     Blood transfusion     Dry eyes     Hypothyroidism 10/9/2012    Joint pain     Kidney stones 10/9/2012    Lupus     Leblanc's neuroma of right foot 2014    Substance abuse     Systemic lupus erythematosus        History reviewed. No pertinent past surgical history.    Current Outpatient Prescriptions   Medication Sig Dispense Refill    azelastine (ASTELIN) 137 mcg (0.1 %) nasal spray 2 sprays by Nasal route 2 (two) times daily as needed.       doxycycline (VIBRAMYCIN) 50 MG capsule Take 1 capsule (50 mg total) by mouth once daily. 30 capsule 1    estrogen, conjugated,-medroxyprogesterone 0.3-1.5 mg (PREMPRO) 0.3-1.5 mg per tablet Take 1 tablet by mouth once daily. 30 tablet 11    fluocinolone acetonide oil (DERMOTIC OIL) 0.01 % Drop Place 5 drops in ear(s) daily as needed.       fluticasone (FLONASE) 50 mcg/actuation nasal spray 2 sprays by Each Nare route once daily.      folic acid (FOLVITE) 1 MG tablet Take 2 tablets (2 mg total) by mouth once daily. 60 tablet 11 " "   ipratropium (ATROVENT) 0.06 % nasal spray 2 sprays by Nasal route 3 (three) times daily.      levothyroxine (SYNTHROID) 75 MCG tablet Take 75 mcg by mouth once daily.      metronidazole 0.75% (METROCREAM) 0.75 % Crea APPLY TOPICALLY TO FACE TWICE DAILY  2    nitrofurantoin (MACRODANTIN) 50 MG capsule every other day.       potassium citrate (UROCIT-K) 10 mEq TbSR TAKE 2 TABLETS (20 MEQ TOTAL) BY MOUTH ONCE DAILY. 180 tablet 3    sulfacetamide sodium-sulfur 9.8-4.8 % Crea Apply 1 inch topically once daily. 57 g 1    triamterene-hydrochlorothiazide 50-25 mg (DYAZIDE) 50-25 mg Cap Take 1 capsule by mouth once daily.      methotrexate 2.5 MG Tab TAKE 4 TABLETS (12.5 MG TOTAL) BY MOUTH EVERY 7 DAYS. 48 tablet 0     No current facility-administered medications for this visit.           Review of patient's allergies indicates:   Allergen Reactions    Sulfa (sulfonamide antibiotics)     Sulfamethoxazole-trimethoprim      Other reaction(s): Flushing (skin)       .  Family History   Problem Relation Age of Onset    Lupus Sister     Thyroid cancer Sister     Glaucoma Sister     Melanoma Father     Thyroid cancer Father     Diabetes Father     Cataracts Father     Diabetes Mother     Glaucoma Mother     Cataracts Mother     Hypertension Brother     Kidney failure Sister     Lupus Sister     Glaucoma Sister     Breast cancer Paternal Grandmother     Kidney disease Neg Hx     Psoriasis Neg Hx     Eczema Neg Hx     Acne Neg Hx     Colon cancer Neg Hx     Ovarian cancer Neg Hx     Thrombophilia Neg Hx     Thrombosis Neg Hx        Social History   Substance Use Topics    Smoking status: Never Smoker    Smokeless tobacco: None    Alcohol use Yes      Comment: 1 drink per week       Visit Vitals    BP 98/62    Ht 5' 3" (1.6 m)    Wt 59 kg (130 lb 1.1 oz)    LMP 08/18/2013    BMI 23.04 kg/m2         GEN:  Alert and oriented lady in no acute distress.                ABDOMEN: Flat, soft and non " tender. No mass, hepatomegaly, RUQT.   PELVIC:      Vulva: normal genitalia. No lesions. No inguinal adenopathy.      Urethra: normal size and location. No lesion, prolapse, or discharge.       Vagina: Moist, min watery discharge, no lesions or granulation tissue seen today      Cervix: Normal appearance. Free of discharge or suspicious lesion - mild erythema on portio - tx'ed w Agn.          WET PREP: Nl epi's and random WBC's - no pathogens    IMPRESSION: resolving granulation tissue of ?etiology (? Lupus, meds,?)     - low libido but not effecting quality of life or relationship - talk re age, lab eval prn, testosterone supplimentation - we agree to simply follow as not problematic      COUNSELING:  - as above, rec she discuss pos asso w vaginal changes and LE w her rheumatologist    PLAN: RTC 1-2 months for repeat exam/eval

## 2017-05-06 RX ORDER — FOLIC ACID 1 MG/1
TABLET ORAL
Qty: 180 TABLET | Refills: 2 | Status: SHIPPED | OUTPATIENT
Start: 2017-05-06 | End: 2017-10-17

## 2017-05-17 DIAGNOSIS — L71.9 ROSACEA: ICD-10-CM

## 2017-05-17 NOTE — TELEPHONE ENCOUNTER
Informed Ty at [facility] that pt prescription request has been sent to provider. Once she reviews it and make a determination, it will get sent to the pharmacy.    ----- Message from Gloria Robles sent at 5/17/2017 11:12 AM CDT -----  Regarding: jaelyn leonard /MARLEY  Contact: [phone]  Please call [facility] on above patient said the faxed over a refill request on above patient have not heard anything need to speak with the nurse thanks

## 2017-05-23 RX ORDER — DOXYCYCLINE HYCLATE 50 MG/1
50 CAPSULE ORAL DAILY
Qty: 30 CAPSULE | Refills: 1 | Status: SHIPPED | OUTPATIENT
Start: 2017-05-23 | End: 2018-01-26 | Stop reason: ALTCHOICE

## 2017-06-08 ENCOUNTER — TELEPHONE (OUTPATIENT)
Dept: RHEUMATOLOGY | Facility: CLINIC | Age: 52
End: 2017-06-08

## 2017-06-25 DIAGNOSIS — M32.8 OTHER FORMS OF SYSTEMIC LUPUS ERYTHEMATOSUS, UNSPECIFIED ORGAN INVOLVEMENT STATUS: ICD-10-CM

## 2017-06-25 RX ORDER — METHOTREXATE 2.5 MG/1
TABLET ORAL
Qty: 48 TABLET | Refills: 0 | Status: SHIPPED | OUTPATIENT
Start: 2017-06-25 | End: 2017-09-25 | Stop reason: SDUPTHER

## 2017-07-12 ENCOUNTER — LAB VISIT (OUTPATIENT)
Dept: LAB | Facility: HOSPITAL | Age: 52
End: 2017-07-12
Attending: INTERNAL MEDICINE
Payer: COMMERCIAL

## 2017-07-12 DIAGNOSIS — M32.9 SLE (SYSTEMIC LUPUS ERYTHEMATOSUS): ICD-10-CM

## 2017-07-12 LAB
ALBUMIN SERPL BCP-MCNC: 3.4 G/DL
ALP SERPL-CCNC: 77 U/L
ALT SERPL W/O P-5'-P-CCNC: 11 U/L
ANION GAP SERPL CALC-SCNC: 5 MMOL/L
AST SERPL-CCNC: 18 U/L
BASOPHILS # BLD AUTO: 0.01 K/UL
BASOPHILS NFR BLD: 0.3 %
BILIRUB SERPL-MCNC: 0.4 MG/DL
BUN SERPL-MCNC: 21 MG/DL
C3 SERPL-MCNC: 89 MG/DL
C4 SERPL-MCNC: 10 MG/DL
CALCIUM SERPL-MCNC: 8.7 MG/DL
CHLORIDE SERPL-SCNC: 105 MMOL/L
CO2 SERPL-SCNC: 30 MMOL/L
CREAT SERPL-MCNC: 0.9 MG/DL
CRP SERPL-MCNC: 1.3 MG/L
DIFFERENTIAL METHOD: ABNORMAL
EOSINOPHIL # BLD AUTO: 0 K/UL
EOSINOPHIL NFR BLD: 0.9 %
ERYTHROCYTE [DISTWIDTH] IN BLOOD BY AUTOMATED COUNT: 13.3 %
ERYTHROCYTE [SEDIMENTATION RATE] IN BLOOD BY WESTERGREN METHOD: 82 MM/HR
EST. GFR  (AFRICAN AMERICAN): >60 ML/MIN/1.73 M^2
EST. GFR  (NON AFRICAN AMERICAN): >60 ML/MIN/1.73 M^2
GLUCOSE SERPL-MCNC: 96 MG/DL
HCT VFR BLD AUTO: 35.5 %
HGB BLD-MCNC: 12 G/DL
LYMPHOCYTES # BLD AUTO: 0.7 K/UL
LYMPHOCYTES NFR BLD: 21.7 %
MCH RBC QN AUTO: 31.6 PG
MCHC RBC AUTO-ENTMCNC: 33.8 %
MCV RBC AUTO: 93 FL
MONOCYTES # BLD AUTO: 0.4 K/UL
MONOCYTES NFR BLD: 10.9 %
NEUTROPHILS # BLD AUTO: 2.1 K/UL
NEUTROPHILS NFR BLD: 66.2 %
PLATELET # BLD AUTO: 213 K/UL
PMV BLD AUTO: 10.2 FL
POTASSIUM SERPL-SCNC: 3.9 MMOL/L
PROT SERPL-MCNC: 6.8 G/DL
RBC # BLD AUTO: 3.8 M/UL
SODIUM SERPL-SCNC: 140 MMOL/L
WBC # BLD AUTO: 3.22 K/UL

## 2017-07-12 PROCEDURE — 36415 COLL VENOUS BLD VENIPUNCTURE: CPT | Mod: PO

## 2017-07-12 PROCEDURE — 85651 RBC SED RATE NONAUTOMATED: CPT

## 2017-07-12 PROCEDURE — 86140 C-REACTIVE PROTEIN: CPT

## 2017-07-12 PROCEDURE — 86160 COMPLEMENT ANTIGEN: CPT | Mod: 59

## 2017-07-12 PROCEDURE — 86160 COMPLEMENT ANTIGEN: CPT

## 2017-07-12 PROCEDURE — 85025 COMPLETE CBC W/AUTO DIFF WBC: CPT

## 2017-07-12 PROCEDURE — 86225 DNA ANTIBODY NATIVE: CPT

## 2017-07-12 PROCEDURE — 80053 COMPREHEN METABOLIC PANEL: CPT

## 2017-07-13 ENCOUNTER — TELEPHONE (OUTPATIENT)
Dept: RHEUMATOLOGY | Facility: CLINIC | Age: 52
End: 2017-07-13

## 2017-07-13 LAB — DSDNA AB SER-ACNC: NORMAL [IU]/ML

## 2017-07-13 NOTE — TELEPHONE ENCOUNTER
Pt may be having lupus flare according to labs, needs appt this month with myself or fellow and myself. Please schedule. Thanks MINA

## 2017-08-02 ENCOUNTER — PATIENT MESSAGE (OUTPATIENT)
Dept: RHEUMATOLOGY | Facility: CLINIC | Age: 52
End: 2017-08-02

## 2017-09-25 DIAGNOSIS — M32.8 OTHER FORMS OF SYSTEMIC LUPUS ERYTHEMATOSUS, UNSPECIFIED ORGAN INVOLVEMENT STATUS: ICD-10-CM

## 2017-09-25 RX ORDER — METHOTREXATE 2.5 MG/1
TABLET ORAL
Qty: 16 TABLET | Refills: 0 | Status: SHIPPED | OUTPATIENT
Start: 2017-09-25 | End: 2017-10-17

## 2017-09-27 ENCOUNTER — PATIENT MESSAGE (OUTPATIENT)
Dept: RHEUMATOLOGY | Facility: CLINIC | Age: 52
End: 2017-09-27

## 2017-10-06 ENCOUNTER — PATIENT MESSAGE (OUTPATIENT)
Dept: RHEUMATOLOGY | Facility: CLINIC | Age: 52
End: 2017-10-06

## 2017-10-06 ENCOUNTER — LAB VISIT (OUTPATIENT)
Dept: LAB | Facility: HOSPITAL | Age: 52
End: 2017-10-06
Attending: INTERNAL MEDICINE
Payer: COMMERCIAL

## 2017-10-06 ENCOUNTER — TELEPHONE (OUTPATIENT)
Dept: RHEUMATOLOGY | Facility: CLINIC | Age: 52
End: 2017-10-06

## 2017-10-06 DIAGNOSIS — M32.19 OTHER SYSTEMIC LUPUS ERYTHEMATOSUS WITH OTHER ORGAN INVOLVEMENT: Primary | ICD-10-CM

## 2017-10-06 DIAGNOSIS — M32.9 SLE (SYSTEMIC LUPUS ERYTHEMATOSUS): ICD-10-CM

## 2017-10-06 LAB
ALBUMIN SERPL BCP-MCNC: 3.5 G/DL
ALP SERPL-CCNC: 84 U/L
ALT SERPL W/O P-5'-P-CCNC: 18 U/L
ANION GAP SERPL CALC-SCNC: 11 MMOL/L
AST SERPL-CCNC: 23 U/L
BASOPHILS # BLD AUTO: 0.01 K/UL
BASOPHILS NFR BLD: 0.3 %
BILIRUB SERPL-MCNC: 0.6 MG/DL
BUN SERPL-MCNC: 19 MG/DL
C3 SERPL-MCNC: 96 MG/DL
C4 SERPL-MCNC: 10 MG/DL
CALCIUM SERPL-MCNC: 9.1 MG/DL
CHLORIDE SERPL-SCNC: 101 MMOL/L
CO2 SERPL-SCNC: 28 MMOL/L
CREAT SERPL-MCNC: 0.9 MG/DL
CRP SERPL-MCNC: 0.9 MG/L
DIFFERENTIAL METHOD: ABNORMAL
EOSINOPHIL # BLD AUTO: 0 K/UL
EOSINOPHIL NFR BLD: 0.6 %
ERYTHROCYTE [DISTWIDTH] IN BLOOD BY AUTOMATED COUNT: 13.1 %
ERYTHROCYTE [SEDIMENTATION RATE] IN BLOOD BY WESTERGREN METHOD: 61 MM/HR
EST. GFR  (AFRICAN AMERICAN): >60 ML/MIN/1.73 M^2
EST. GFR  (NON AFRICAN AMERICAN): >60 ML/MIN/1.73 M^2
GLUCOSE SERPL-MCNC: 73 MG/DL
HCT VFR BLD AUTO: 36.6 %
HGB BLD-MCNC: 12.8 G/DL
LYMPHOCYTES # BLD AUTO: 0.9 K/UL
LYMPHOCYTES NFR BLD: 28 %
MCH RBC QN AUTO: 31.5 PG
MCHC RBC AUTO-ENTMCNC: 35 G/DL
MCV RBC AUTO: 90 FL
MONOCYTES # BLD AUTO: 0.4 K/UL
MONOCYTES NFR BLD: 12.3 %
NEUTROPHILS # BLD AUTO: 1.9 K/UL
NEUTROPHILS NFR BLD: 58.5 %
PLATELET # BLD AUTO: 227 K/UL
PMV BLD AUTO: 10.1 FL
POTASSIUM SERPL-SCNC: 3.5 MMOL/L
PROT SERPL-MCNC: 7.4 G/DL
RBC # BLD AUTO: 4.06 M/UL
SODIUM SERPL-SCNC: 140 MMOL/L
WBC # BLD AUTO: 3.25 K/UL

## 2017-10-06 PROCEDURE — 86160 COMPLEMENT ANTIGEN: CPT | Mod: 59

## 2017-10-06 PROCEDURE — 86225 DNA ANTIBODY NATIVE: CPT

## 2017-10-06 PROCEDURE — 36415 COLL VENOUS BLD VENIPUNCTURE: CPT | Mod: PO

## 2017-10-06 PROCEDURE — 86140 C-REACTIVE PROTEIN: CPT

## 2017-10-06 PROCEDURE — 80053 COMPREHEN METABOLIC PANEL: CPT

## 2017-10-06 PROCEDURE — 86160 COMPLEMENT ANTIGEN: CPT

## 2017-10-06 PROCEDURE — 85025 COMPLETE CBC W/AUTO DIFF WBC: CPT

## 2017-10-06 PROCEDURE — 85651 RBC SED RATE NONAUTOMATED: CPT

## 2017-10-06 RX ORDER — METHYLPREDNISOLONE 4 MG/1
TABLET ORAL
Qty: 1 PACKAGE | Refills: 0 | Status: SHIPPED | OUTPATIENT
Start: 2017-10-06 | End: 2017-10-24

## 2017-10-06 NOTE — TELEPHONE ENCOUNTER
Please tell her she I will send Medrol Dospak to her pharmacy. Would like her to get due lupus labs today before starting. Please schedule. Thanks MINA

## 2017-10-07 DIAGNOSIS — N95.1 HOT FLASH, MENOPAUSAL: ICD-10-CM

## 2017-10-07 DIAGNOSIS — N64.4 BILATERAL MASTODYNIA: ICD-10-CM

## 2017-10-07 RX ORDER — CONJUGATED ESTROGENS AND MEDROXYPROGESTERONE ACETATE .3; 1.5 MG/1; MG/1
1 TABLET, SUGAR COATED ORAL DAILY
Qty: 28 TABLET | Refills: 11 | Status: SHIPPED | OUTPATIENT
Start: 2017-10-07 | End: 2018-08-01 | Stop reason: SDUPTHER

## 2017-10-09 LAB — DSDNA AB SER-ACNC: NORMAL [IU]/ML

## 2017-10-17 ENCOUNTER — OFFICE VISIT (OUTPATIENT)
Dept: RHEUMATOLOGY | Facility: CLINIC | Age: 52
End: 2017-10-17
Payer: COMMERCIAL

## 2017-10-17 ENCOUNTER — TELEPHONE (OUTPATIENT)
Dept: RHEUMATOLOGY | Facility: CLINIC | Age: 52
End: 2017-10-17

## 2017-10-17 ENCOUNTER — HOSPITAL ENCOUNTER (OUTPATIENT)
Dept: RADIOLOGY | Facility: HOSPITAL | Age: 52
Discharge: HOME | End: 2017-10-17
Attending: INTERNAL MEDICINE
Payer: COMMERCIAL

## 2017-10-17 VITALS
DIASTOLIC BLOOD PRESSURE: 46 MMHG | HEART RATE: 80 BPM | BODY MASS INDEX: 21.89 KG/M2 | SYSTOLIC BLOOD PRESSURE: 99 MMHG | HEIGHT: 64 IN | WEIGHT: 128.19 LBS

## 2017-10-17 DIAGNOSIS — M32.19 OTHER SYSTEMIC LUPUS ERYTHEMATOSUS WITH OTHER ORGAN INVOLVEMENT: ICD-10-CM

## 2017-10-17 DIAGNOSIS — M35.00 SICCA SYNDROME: ICD-10-CM

## 2017-10-17 DIAGNOSIS — R06.02 SHORTNESS OF BREATH: ICD-10-CM

## 2017-10-17 DIAGNOSIS — M54.32 LEFT SIDED SCIATICA: Primary | ICD-10-CM

## 2017-10-17 DIAGNOSIS — D70.8 OTHER NEUTROPENIA: ICD-10-CM

## 2017-10-17 DIAGNOSIS — R06.00 DYSPNEA, UNSPECIFIED TYPE: ICD-10-CM

## 2017-10-17 DIAGNOSIS — Z79.624 ENCOUNTER FOR LONG TERM CURRENT USE OF AZATHIOPRINE: ICD-10-CM

## 2017-10-17 DIAGNOSIS — R53.82 CHRONIC FATIGUE: ICD-10-CM

## 2017-10-17 DIAGNOSIS — M54.16 LEFT LUMBAR RADICULOPATHY: ICD-10-CM

## 2017-10-17 DIAGNOSIS — M32.19 OTHER SYSTEMIC LUPUS ERYTHEMATOSUS WITH OTHER ORGAN INVOLVEMENT: Primary | ICD-10-CM

## 2017-10-17 DIAGNOSIS — H81.01 MENIERE'S DISEASE OF RIGHT EAR: ICD-10-CM

## 2017-10-17 DIAGNOSIS — Z79.899 HISTORY OF LONG-TERM TREATMENT WITH HIGH-RISK MEDICATION: ICD-10-CM

## 2017-10-17 DIAGNOSIS — M54.16 LEFT LUMBAR RADICULOPATHY: Primary | ICD-10-CM

## 2017-10-17 DIAGNOSIS — L71.9 ROSACEA: ICD-10-CM

## 2017-10-17 DIAGNOSIS — M32.19 SYSTEMIC LUPUS ERYTHEMATOSUS WITH OTHER ORGAN INVOLVEMENT, UNSPECIFIED SLE TYPE: ICD-10-CM

## 2017-10-17 PROCEDURE — 99214 OFFICE O/P EST MOD 30 MIN: CPT | Mod: S$GLB,,, | Performed by: INTERNAL MEDICINE

## 2017-10-17 PROCEDURE — 71020 XR CHEST PA AND LATERAL: CPT | Mod: TC

## 2017-10-17 PROCEDURE — 71020 XR CHEST PA AND LATERAL: CPT | Mod: 26,,, | Performed by: RADIOLOGY

## 2017-10-17 PROCEDURE — 99999 PR PBB SHADOW E&M-EST. PATIENT-LVL IV: CPT | Mod: PBBFAC,,, | Performed by: INTERNAL MEDICINE

## 2017-10-17 RX ORDER — AZATHIOPRINE 50 MG/1
50 TABLET ORAL 2 TIMES DAILY
Qty: 60 TABLET | Refills: 1 | Status: SHIPPED | OUTPATIENT
Start: 2017-10-17 | End: 2017-11-06 | Stop reason: SDUPTHER

## 2017-10-17 RX ORDER — PREDNISONE 5 MG/1
10 TABLET ORAL DAILY
Qty: 60 TABLET | Refills: 2 | Status: SHIPPED | OUTPATIENT
Start: 2017-10-17 | End: 2018-01-09 | Stop reason: SDUPTHER

## 2017-10-17 ASSESSMENT — ROUTINE ASSESSMENT OF PATIENT INDEX DATA (RAPID3)
WHEN YOU AWAKENED IN THE MORNING OVER THE LAST WEEK, PLEASE INDICATE THE AMOUNT OF TIME IT TAKES UNTIL YOU ARE AS LIMBER AS YOU WILL BE FOR THE DAY: 5 MINS
TOTAL RAPID3 SCORE: 3.89
PAIN SCORE: 4
AM STIFFNESS SCORE: 1, YES
MDHAQ FUNCTION SCORE: .5
PATIENT GLOBAL ASSESSMENT SCORE: 6
FATIGUE SCORE: 7
PSYCHOLOGICAL DISTRESS SCORE: 1.1

## 2017-10-17 ASSESSMENT — SYSTEMIC LUPUS ERYTHEMATOSUS DISEASE ACTIVITY INDEX (SLEDAI): TOTAL_SCORE: 4

## 2017-10-17 NOTE — TELEPHONE ENCOUNTER
Liv, please schedule lumbar spine x-ray. Please remind her to fax the report on her lumbar MRI. Tell her the MRI shows abnormal dense L3 verterbra which I would like to see on plain x-ray. MINA

## 2017-10-17 NOTE — PROGRESS NOTES
"Subjective:       Patient ID: Madeleine Nunez is a 52 y.o. female.    Chief Complaint: SLE    HPI for past 3 months having arthralgias pips, wrists, some swelling thumb mcp and ips and generalized. + fatigue, generalized. Also left posterolateral leg, "sciatica" worse after working out.  Has lost 5# weight, + fatigue.  + increased hair fall. No oral or nasal ulcers. No rash. No chest pain. Also some SOB with working out, not with walking.    Comprehensive Initial Assessment  Pain level: see [facility]  Functional status: see [facility]  Patient has:     Previous history malignancy: No   Weight loss: 5#   Previous longstanding steroid use or immunosuppression: Yes, low dose methotrexate    Recent significant infection: No   Fracture or suspected fracture: No   Bowel or bladder incontinence: No    Prior treatment and response: none  Employment status:   Are radicular symptoms present? Yes, left    Physical examination  Straight leg raise test b/l : negative  Ankle and knee reflexes: normal  Quadriceps; ankle and great toe dorsiflexion and plantar flexion strength: Normal  Pulses in lower extremities: Normal  Sensory exam: NT    Treatment Plan  Advised against bed rest: Yes  Advised normal activity as tolerated: Yes  Review of Systems   Constitutional: Positive for fatigue. Negative for appetite change, fever and unexpected weight change.   HENT: Negative for mouth sores.    Eyes: Negative for visual disturbance.   Respiratory: Negative for cough, shortness of breath and wheezing.    Cardiovascular: Negative for chest pain and palpitations.   Gastrointestinal: Negative for abdominal pain, anal bleeding, blood in stool, constipation, diarrhea, nausea and vomiting.   Genitourinary: Negative for dysuria, frequency and urgency.   Musculoskeletal: Negative for arthralgias, back pain, gait problem, joint swelling, myalgias, neck pain and neck stiffness.   Skin: Negative for rash.   Neurological: Negative for weakness, numbness and " "headaches.   Hematological: Negative for adenopathy. Does not bruise/bleed easily.   Psychiatric/Behavioral: Negative for sleep disturbance. The patient is not nervous/anxious.          Objective:   BP (!) 99/46   Pulse 80   Ht 5' 3.6" (1.615 m)   Wt 58.2 kg (128 lb 3.2 oz)   LMP 08/18/2013   BMI 22.28 kg/m²      Physical Exam   Constitutional: She is oriented to person, place, and time and well-developed, well-nourished, and in no distress.   HENT:   Head: Normocephalic and atraumatic.   Mouth/Throat: Oropharynx is clear and moist.   Eyes: Conjunctivae and EOM are normal.   Neck: Normal range of motion. Neck supple. No thyromegaly present.   Cardiovascular: Normal rate, regular rhythm, normal heart sounds and intact distal pulses.  Exam reveals no gallop and no friction rub.    No murmur heard.  Pulmonary/Chest: Breath sounds normal. She has no wheezes. She has no rales. She exhibits no tenderness.   Abdominal: Soft. She exhibits no distension and no mass. There is no tenderness.       Right Side Rheumatological Exam     Examination finds the shoulder, elbow, wrist, knee, 1st PIP, 1st MCP, 2nd PIP, 2nd MCP, 3rd PIP, 3rd MCP, 4th PIP, 4th MCP, 5th PIP and 5th MCP normal.    Muscle Strength (0-5 scale):  Neck Flexion:  5  Neck Extension: 5  Deltoid:  5  Biceps: 5/5   Triceps:  5  : 5/5   Iliopsoas: 5  Quadriceps:  5   Distal Lower Extremity: 5    Left Side Rheumatological Exam     Examination finds the shoulder, elbow, wrist, knee, 1st PIP, 1st MCP, 2nd PIP, 2nd MCP, 3rd PIP, 3rd MCP, 4th PIP, 4th MCP, 5th PIP and 5th MCP normal.    Muscle Strength (0-5 scale):  Neck Extension: 5  Deltoid:  5  Biceps: 5/5   Triceps:  5  :  5/5   Iliopsoas: 5  Quadriceps:  5   Distal Lower Extremity: 5      Lymphadenopathy:     She has no cervical adenopathy.   Neurological: She is alert and oriented to person, place, and time. She displays normal reflexes. Gait normal.   Reflex Scores:       Tricep reflexes are 1+ on the " right side and 1+ on the left side.       Bicep reflexes are 1+ on the right side and 1+ on the left side.       Brachioradialis reflexes are 1+ on the right side and 1+ on the left side.       Patellar reflexes are 1+ on the right side and 1+ on the left side.       Achilles reflexes are 1+ on the right side and 1+ on the left side.  Nl motor strength UE and LE bilateral   Musculoskeletal: She exhibits no edema.       Results for HUBERT RODRIGUEZ (MRN [id number]) as of 10/17/2017 12:02   Ref. Range 10/6/2017 14:22   WBC Latest Ref Range: 3.90 - 12.70 K/uL 3.25 (L)   RBC Latest Ref Range: 4.00 - 5.40 M/uL 4.06   Hemoglobin Latest Ref Range: 12.0 - 16.0 g/dL 12.8   Hematocrit Latest Ref Range: 37.0 - 48.5 % 36.6 (L)   MCV Latest Ref Range: 82 - 98 fL 90   MCH Latest Ref Range: 27.0 - 31.0 pg 31.5 (H)   MCHC Latest Ref Range: 32.0 - 36.0 g/dL 35.0   RDW Latest Ref Range: 11.5 - 14.5 % 13.1   Platelets Latest Ref Range: 150 - 350 K/uL 227   MPV Latest Ref Range: 9.2 - 12.9 fL 10.1   Gran% Latest Ref Range: 38.0 - 73.0 % 58.5   Gran # Latest Ref Range: 1.8 - 7.7 K/uL 1.9   Lymph% Latest Ref Range: 18.0 - 48.0 % 28.0   Lymph # Latest Ref Range: 1.0 - 4.8 K/uL 0.9 (L)   Mono% Latest Ref Range: 4.0 - 15.0 % 12.3   Mono # Latest Ref Range: 0.3 - 1.0 K/uL 0.4   Eosinophil% Latest Ref Range: 0.0 - 8.0 % 0.6   Eos # Latest Ref Range: 0.0 - 0.5 K/uL 0.0   Basophil% Latest Ref Range: 0.0 - 1.9 % 0.3   Baso # Latest Ref Range: 0.00 - 0.20 K/uL 0.01   Sed Rate Latest Ref Range: 0 - 20 mm/Hr 61 (H)   Sodium Latest Ref Range: 136 - 145 mmol/L 140   Potassium Latest Ref Range: 3.5 - 5.1 mmol/L 3.5   Chloride Latest Ref Range: 95 - 110 mmol/L 101   CO2 Latest Ref Range: 23 - 29 mmol/L 28   Anion Gap Latest Ref Range: 8 - 16 mmol/L 11   BUN, Bld Latest Ref Range: 6 - 20 mg/dL 19   Creatinine Latest Ref Range: 0.5 - 1.4 mg/dL 0.9   eGFR if non African American Latest Ref Range: >60 mL/min/1.73 m^2 >60.0   eGFR if  Latest  Ref Range: >60 mL/min/1.73 m^2 >60.0   Glucose Latest Ref Range: 70 - 110 mg/dL 73   Calcium Latest Ref Range: 8.7 - 10.5 mg/dL 9.1   Alkaline Phosphatase Latest Ref Range: 55 - 135 U/L 84   Total Protein Latest Ref Range: 6.0 - 8.4 g/dL 7.4   Albumin Latest Ref Range: 3.5 - 5.2 g/dL 3.5   Total Bilirubin Latest Ref Range: 0.1 - 1.0 mg/dL 0.6   AST Latest Ref Range: 10 - 40 U/L 23   ALT Latest Ref Range: 10 - 44 U/L 18   CRP Latest Ref Range: 0.0 - 8.2 mg/L 0.9   ds DNA Ab Latest Ref Range: Negative 1:10  Negative 1:10   Complement (C-3) Latest Ref Range: 50 - 180 mg/dL 96   Complement (C-4) Latest Ref Range: 11 - 44 mg/dL 10 (L)   Results for HUBERT RODRIGUEZ (MRN [id number]) as of 10/17/2017 12:02   Ref. Range 2/9/2017 08:00   Cholesterol Latest Ref Range: 120 - 199 mg/dL 161   HDL Latest Ref Range: 40 - 75 mg/dL 52   LDL Cholesterol Latest Ref Range: 63.0 - 159.0 mg/dL 93.6   Total Cholesterol/HDL Ratio Latest Ref Range: 2.0 - 5.0  3.1   Triglycerides Latest Ref Range: 30 - 150 mg/dL 77        Assessment:         SOB with workouts but baseline hypotension due to Dyazide for Meniere's and doesn't hydrate well  Left sciatica, reportedly normal outside lumbar MRI, disc brought to Radiology to be scanned  SLE SLEDAI=3, flare  Chronic leukopenia, lymphopenia have had to progressively reduce methotrexate b/o leukopenia, now on subtherapeutic dose and unable to increase with baseline leukopenia   Meniere's  hypotension  Plan:   U/a and upcr today  TPMT today  * quadrivalent influenza vaccine today  Stop methotrexate and and finish folic acid  Chest x-ray today  PFTs  [facility]  If TPMT normal, start azathioprine 50mg twice daily with weekly CBC x 4, biweekly x4; cmp monthly  ACR handout on azathioprine, discussed  Prednisone 5mg tabs 2 tabs=10mg daily  **Should speak with her ENT about reducing dose of Dyazide perhaps to 50 /12.5mg triam/ HCTZ with chronic hypotension  RTC 6 months with standing lupus  labs      *received from [facility] MR lumbar spine w/o contrast:    straightening of lumbar lordosis  Diffuse decreased T1 signal lower thoracic spine to sacrum, red marrow conversion ? Anemia   large T1 and T2 hyperintense lesion right L3 vertebral body 2.5cm in maximal dimension, most c/w hemangioma  Additional T1 and T2 hyperintense lesion right L1 with extension into the pedicle. Most c/w vertebral body hemangioma  Small right sided protrusion L2-3  Minimal asymmetric disc bulge right L3-4  Minimal disc bulge and mild facet arthropathy L4-5  Minimal facet arthropathy L5-S1

## 2017-10-18 ENCOUNTER — TELEPHONE (OUTPATIENT)
Dept: RHEUMATOLOGY | Facility: CLINIC | Age: 52
End: 2017-10-18

## 2017-10-23 ENCOUNTER — PATIENT MESSAGE (OUTPATIENT)
Dept: RHEUMATOLOGY | Facility: CLINIC | Age: 52
End: 2017-10-23

## 2017-10-23 NOTE — PROGRESS NOTES
Subjective:      Patient ID: Madeleine Nunez is a 52 y.o. female.    Chief Complaint: Low-back Pain    Ms Nunez is a 53 yo female here for evaluation of low back pain and left leg pain.  The pain  Is worse in the left leg.  The pain started 3 months ago.  3 months ago she started having weird sensation in her leg and it would keep her up at night.  Then the pain would be all day.  Then she started getting shocking pain with working out.  The pain was bad with sitting.   The pain was also bad after workouts.  The pain is the back of the leg from upper thigh to calf.  The pain will also go into the glute.  It was hard to bend the knee because the pain was severe.  She was working with her brother, a PT, and doing some stretches.  She got back on prednisone a week ago for SLE and that has helped the pain.  She can still feel the sensation in the back of her leg but much better.  The pain is a throbbing.  Currently it is intermittent.  It is with standing too much and sitting.  She is sleeping at night.  Pain is 2/10 now, worst 5/10 after working out or sitting, best 0/10 at night.  She was taking aleve at night    *received from [facility] MR lumbar spine w/o contrast:     straightening of lumbar lordosis  Diffuse decreased T1 signal lower thoracic spine to sacrum, red marrow conversion ? Anemia   large T1 and T2 hyperintense lesion right L3 vertebral body 2.5cm in maximal dimension, most c/w hemangioma  Additional T1 and T2 hyperintense lesion right L1 with extension into the pedicle. 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    Review of patient's family history indicates:    Lupus                          Sister                    Thyroid cancer                 Sister                    Glaucoma                       Sister                    Melanoma                       Father                    Thyroid cancer                 Father                    Diabetes                       Father                    Cataracts                      Father                    Diabetes                       Mother                    Glaucoma                       Mother                    Cataracts                      Mother                    Hypertension                   Brother                   Kidney failure                 Sister                    Lupus                          Sister                    Glaucoma                       Sister                    Breast cancer                  Paternal Grandmother      Kidney disease                 Neg Hx                    Psoriasis                      Neg Hx                    Eczema                         Neg Hx                    Acne                           Neg Hx                    Colon cancer                   Neg Hx                    Ovarian cancer                 Neg Hx                    Thrombophilia                  Neg Hx                    Thrombosis                     Neg Hx                      Social History    Marital status:              Spouse name:                       Years of education:                 Number of children:               Occupational History  Occupation          Employer            Comment                                   [employer]         Social History Main Topics    Smoking status: Never Smoker                                                                Alcohol use: Yes                Comment: 1 drink per week     Drug use: No              Sexual activity: Yes               Partners with: Male       Birth control/protection: Post-menopausal       Comment: mut monog    Other Topics            Concern  Are you pregnant or th* No  Breast-feeding          No        Current Outpatient Prescriptions:  azathioprine (IMURAN) 50 mg Tab, Take 1 tablet (50 mg total) by mouth 2 (two) times daily., Disp: 60 tablet, Rfl: 1  azelastine (ASTELIN) 137 mcg (0.1 %) nasal spray, 2 sprays by Nasal route 2 (two) times daily as needed. , Disp: , Rfl:   doxycycline (VIBRAMYCIN) 50 MG capsule, Take 1 capsule (50 mg total) by mouth once daily., Disp: 30 capsule, Rfl: 1  fluocinolone acetonide oil (DERMOTIC OIL) 0.01 % Drop, Place 5 drops in ear(s) daily as needed. , Disp: , Rfl:   fluticasone (FLONASE) 50 mcg/actuation nasal spray, 2 sprays by Each Nare route once daily., Disp: , Rfl:   ipratropium (ATROVENT) 0.06 % nasal spray, 2 sprays by Nasal route 3 (three) times daily., Disp: , Rfl:   levothyroxine (SYNTHROID) 75 MCG tablet, Take 75 mcg by mouth once daily., Disp: , Rfl:   methylPREDNISolone (MEDROL DOSEPACK) 4 mg tablet, use as directed, Disp: 1 Package, Rfl: 0  metronidazole 0.75% (METROCREAM) 0.75 % Crea, APPLY TOPICALLY TO FACE TWICE DAILY, Disp: , Rfl: 2  nitrofurantoin (MACRODANTIN) 50 MG capsule, every other day. , Disp: , Rfl:    potassium citrate (UROCIT-K) 10 mEq TbSR, TAKE 2 TABLETS (20 MEQ TOTAL) BY MOUTH ONCE DAILY., Disp: 180 tablet, Rfl: 3  predniSONE (DELTASONE) 5 MG tablet, Take 2 tablets (10 mg total) by mouth once daily., Disp: 60 tablet, Rfl: 2  PREMPRO 0.3-1.5 mg per tablet, TAKE 1 TABLET BY MOUTH ONCE DAILY., Disp: 28 tablet, Rfl: 11  sulfacetamide sodium-sulfur 9.8-4.8 % Crea, Apply 1 inch topically once daily., Disp: 57 g, Rfl: 1  triamterene-hydrochlorothiazide 50-25 mg (DYAZIDE) 50-25 mg Cap, Take 1 capsule by mouth once daily., Disp: , Rfl:     No current facility-administered medications for this visit.       Review  of patient's allergies indicates:   -- Sulfa (sulfonamide antibiotics)    -- Sulfamethoxazole-trimethoprim     --  Other reaction(s): Flushing (skin)          Review of Systems   Constitution: Negative for weight gain and weight loss.   Cardiovascular: Negative for chest pain.   Respiratory: Negative for shortness of breath.    Musculoskeletal: Positive for back pain (left leg). Negative for joint pain and joint swelling.   Gastrointestinal: Negative for abdominal pain and bowel incontinence.   Genitourinary: Negative for bladder incontinence.   Neurological: Negative for numbness.         Objective:        General: Madeleine is well-developed, well-nourished, appears stated age, in no acute distress, alert and oriented to time, place and person.     General    Vitals reviewed.  Constitutional: She is oriented to person, place, and time. She appears well-developed and well-nourished.   HENT:   Head: Normocephalic and atraumatic.   Pulmonary/Chest: Effort normal.   Neurological: She is alert and oriented to person, place, and time.   Psychiatric: She has a normal mood and affect. Her behavior is normal. Judgment and thought content normal.     General Musculoskeletal Exam   Gait: normal     Right Ankle/Foot Exam     Tests   Heel Walk: able to perform  Tiptoe Walk: able to perform    Left Ankle/Foot Exam     Tests   Heel Walk: able to perform  Tiptoe Walk: able to perform  Left Hip Exam     Tenderness   The patient tender to palpation of the piriformis.    Comments:  Pain with piriformis stretch      Back (L-Spine & T-Spine) / Neck (C-Spine) Exam     Tenderness Left paramedian tenderness of the Sacrum.     Back (L-Spine & T-Spine) Range of Motion   Extension: 20   Flexion: 90   Lateral Bend Right: 20   Lateral Bend Left: 20   Rotation Right: 40   Rotation Left: 40     Spinal Sensation   Right Side Sensation  C-Spine Level: normal   L-Spine Level: normal  S-Spine Level: normal  Left Side Sensation  C-Spine Level:  normal  L-Spine Level: normal  S-Spine Level: normal    Back (L-Spine & T-Spine) Tests   Right Side Tests  Straight leg raise:      Sitting SLR: > 70 degrees      Left Side Tests  Straight leg raise:     Sitting SLR: > 70 degrees          Other She has no scoliosis .  Spinal Kyphosis:  Absent      Muscle Strength   Right Upper Extremity   Biceps: 5/5/5   Deltoid:  5/5  Triceps:  5/5  Wrist Extension: 5/5/5   Finger Flexors:  5/5  Left Upper Extremity  Biceps: 5/5/5   Deltoid:  5/5  Triceps:  5/5  Wrist Extension: 5/5/5   Finger Flexors:  5/5  Right Lower Extremity   Hip Flexion: 5/5   Quadriceps:  5/5   Anterior tibial:  5/5/5  EHL:  5/5  Left Lower Extremity   Hip Flexion: 5/5   Quadriceps:  5/5   Anterior tibial:  5/5/5   EHL:  5/5    Reflexes     Left Side  Biceps:  2+  Triceps:  2+  Brachioradialis:  2+  Quadriceps:  2+  Achilles:  2+  Left Miller's Sign:  Absent  Babinski Sign:  absent    Right Side   Biceps:  2+  Triceps:  2+  Brachioradialis:  2+  Quadriceps:  2+  Achilles:  2+  Right Miller's Sign:  absent  Babinski Sign:  absent    Vascular Exam     Right Pulses        Carotid:                  2+    Left Pulses        Carotid:                  2+              Assessment:       1. DDD (degenerative disc disease), lumbar    2. Chronic left-sided low back pain with left-sided sciatica    3. Piriformis syndrome of left side           Plan:       Orders Placed This Encounter    CT Lumbar Spine Without Contrast    Ambulatory Referral to Physical/Occupational Therapy     More than 50% of the total time of 45 minutes was spent in counseling on diagnosis and treatment options.  We reviewed her MRI of the lumbar spine.  We discussed some of the bone changes at L3.  We discussed reimaging with MRI with contrast in a couple months or CT scan now.  We will do a CT scan.  We discussed back pain and the nature of back pain.  We discussed that it is not one thing that causes the pain but an accumulation of multiple  things that we do.    We discussed the benefits of therapy and exercise and continuing to move.    1.  PT for piriformis stretches, glute strengthening, hip flexor stretches and myofascial release at [facility]  2.  She is feeling better on prednisone  3.  We discussed a muscle relaxer will wait for now  4. CT scan of the lumbar spine  5.  Reviewed some stretches with her  6.  MRI report will be scanned into epic, MRI images available in epic  7.  RTC 3 months        Follow-up: Return in about 3 months (around 1/24/2018). If there are any questions prior to this, the patient was instructed to contact the office.

## 2017-10-24 ENCOUNTER — PATIENT MESSAGE (OUTPATIENT)
Dept: RHEUMATOLOGY | Facility: CLINIC | Age: 52
End: 2017-10-24

## 2017-10-24 ENCOUNTER — OFFICE VISIT (OUTPATIENT)
Dept: SPINE | Facility: CLINIC | Age: 52
End: 2017-10-24
Attending: PHYSICAL MEDICINE & REHABILITATION
Payer: COMMERCIAL

## 2017-10-24 VITALS
DIASTOLIC BLOOD PRESSURE: 65 MMHG | HEART RATE: 61 BPM | BODY MASS INDEX: 21.85 KG/M2 | WEIGHT: 128 LBS | SYSTOLIC BLOOD PRESSURE: 94 MMHG | HEIGHT: 64 IN

## 2017-10-24 DIAGNOSIS — M51.36 DDD (DEGENERATIVE DISC DISEASE), LUMBAR: Primary | ICD-10-CM

## 2017-10-24 DIAGNOSIS — G57.02 PIRIFORMIS SYNDROME OF LEFT SIDE: ICD-10-CM

## 2017-10-24 DIAGNOSIS — G89.29 CHRONIC LEFT-SIDED LOW BACK PAIN WITH LEFT-SIDED SCIATICA: ICD-10-CM

## 2017-10-24 DIAGNOSIS — M54.42 CHRONIC LEFT-SIDED LOW BACK PAIN WITH LEFT-SIDED SCIATICA: ICD-10-CM

## 2017-10-24 PROCEDURE — 99204 OFFICE O/P NEW MOD 45 MIN: CPT | Mod: S$GLB,,, | Performed by: PHYSICAL MEDICINE & REHABILITATION

## 2017-10-24 PROCEDURE — 99999 PR PBB SHADOW E&M-EST. PATIENT-LVL V: CPT | Mod: PBBFAC,,, | Performed by: PHYSICAL MEDICINE & REHABILITATION

## 2017-10-24 RX ORDER — LEVOTHYROXINE SODIUM 75 UG/1
100 TABLET ORAL
COMMUNITY
Start: 2017-08-28 | End: 2022-07-20

## 2017-10-24 RX ORDER — DOXYCYCLINE 40 MG/1
CAPSULE ORAL
COMMUNITY
Start: 2017-10-10 | End: 2018-08-30

## 2017-10-24 RX ORDER — POTASSIUM CITRATE 10 MEQ/1
20 TABLET, EXTENDED RELEASE ORAL
COMMUNITY
Start: 2017-07-10 | End: 2018-01-26

## 2017-10-24 RX ORDER — METHOTREXATE 2.5 MG/1
2.5 TABLET ORAL
COMMUNITY
Start: 2012-11-06 | End: 2017-11-17

## 2017-10-24 NOTE — LETTER
October 24, 2017      Wander Bender MD  [address]  [facility] [address]           [facility]  [address], [address]  [facility] [address]  Phone: [phone]  Fax: [phone]          Patient: Madeleine Nunez   MR Number: [id number]   YOB: 1965   Date of Visit: 10/24/2017       Dear Dr. Wander Bender:    Thank you for referring Madeleine Nunez to me for evaluation. Attached you will find relevant portions of my assessment and plan of care.    If you have questions, please do not hesitate to call me. I look forward to following Madeleine Nunez along with you.    Sincerely,    Heidi Elkins MD    Enclosure  CC:  No Recipients    If you would like to receive this communication electronically, please contact [email or web address] or [phone] to request more information on [facility] access.    For providers and/or their staff who would like to refer a patient to [facility], please contact us through our one-stop-shop provider referral line, [facility], at [phone].    If you feel you have received this communication in error or would no longer like to receive these types of communications, please e-mail [email or web address]

## 2017-10-25 ENCOUNTER — PATIENT MESSAGE (OUTPATIENT)
Dept: RHEUMATOLOGY | Facility: CLINIC | Age: 52
End: 2017-10-25

## 2017-10-31 ENCOUNTER — HOSPITAL ENCOUNTER (OUTPATIENT)
Dept: RADIOLOGY | Facility: HOSPITAL | Age: 52
Discharge: HOME | End: 2017-10-31
Attending: PHYSICAL MEDICINE & REHABILITATION
Payer: COMMERCIAL

## 2017-10-31 DIAGNOSIS — M51.36 DDD (DEGENERATIVE DISC DISEASE), LUMBAR: ICD-10-CM

## 2017-10-31 DIAGNOSIS — G89.29 CHRONIC LEFT-SIDED LOW BACK PAIN WITH LEFT-SIDED SCIATICA: ICD-10-CM

## 2017-10-31 DIAGNOSIS — M54.42 CHRONIC LEFT-SIDED LOW BACK PAIN WITH LEFT-SIDED SCIATICA: ICD-10-CM

## 2017-10-31 PROCEDURE — 72131 CT LUMBAR SPINE W/O DYE: CPT | Mod: TC

## 2017-11-02 ENCOUNTER — LAB VISIT (OUTPATIENT)
Dept: LAB | Facility: HOSPITAL | Age: 52
End: 2017-11-02
Attending: INTERNAL MEDICINE
Payer: COMMERCIAL

## 2017-11-02 DIAGNOSIS — M32.9 SLE (SYSTEMIC LUPUS ERYTHEMATOSUS): ICD-10-CM

## 2017-11-02 LAB
BASOPHILS # BLD AUTO: 0.03 K/UL
BASOPHILS NFR BLD: 0.7 %
DIFFERENTIAL METHOD: ABNORMAL
EOSINOPHIL # BLD AUTO: 0 K/UL
EOSINOPHIL NFR BLD: 0.9 %
ERYTHROCYTE [DISTWIDTH] IN BLOOD BY AUTOMATED COUNT: 14 %
HCT VFR BLD AUTO: 37 %
HGB BLD-MCNC: 11.8 G/DL
IMM GRANULOCYTES # BLD AUTO: 0.02 K/UL
IMM GRANULOCYTES NFR BLD AUTO: 0.4 %
LYMPHOCYTES # BLD AUTO: 0.9 K/UL
LYMPHOCYTES NFR BLD: 19.1 %
MCH RBC QN AUTO: 31.5 PG
MCHC RBC AUTO-ENTMCNC: 31.9 G/DL
MCV RBC AUTO: 99 FL
MONOCYTES # BLD AUTO: 0.5 K/UL
MONOCYTES NFR BLD: 11.7 %
NEUTROPHILS # BLD AUTO: 3 K/UL
NEUTROPHILS NFR BLD: 67.2 %
NRBC BLD-RTO: 0 /100 WBC
PLATELET # BLD AUTO: 206 K/UL
PMV BLD AUTO: 10.3 FL
RBC # BLD AUTO: 3.75 M/UL
WBC # BLD AUTO: 4.46 K/UL

## 2017-11-02 PROCEDURE — 85025 COMPLETE CBC W/AUTO DIFF WBC: CPT

## 2017-11-02 PROCEDURE — 36415 COLL VENOUS BLD VENIPUNCTURE: CPT | Mod: PO

## 2017-11-06 ENCOUNTER — PATIENT MESSAGE (OUTPATIENT)
Dept: RHEUMATOLOGY | Facility: CLINIC | Age: 52
End: 2017-11-06

## 2017-11-06 DIAGNOSIS — M32.19 OTHER SYSTEMIC LUPUS ERYTHEMATOSUS WITH OTHER ORGAN INVOLVEMENT: ICD-10-CM

## 2017-11-07 RX ORDER — AZATHIOPRINE 50 MG/1
TABLET ORAL
Qty: 60 TABLET | Refills: 0 | Status: SHIPPED | OUTPATIENT
Start: 2017-11-07 | End: 2017-12-13 | Stop reason: SDUPTHER

## 2017-11-08 ENCOUNTER — LAB VISIT (OUTPATIENT)
Dept: LAB | Facility: HOSPITAL | Age: 52
End: 2017-11-08
Attending: INTERNAL MEDICINE
Payer: COMMERCIAL

## 2017-11-08 DIAGNOSIS — M32.9 SLE (SYSTEMIC LUPUS ERYTHEMATOSUS): ICD-10-CM

## 2017-11-08 LAB
BASOPHILS # BLD AUTO: 0.03 K/UL
BASOPHILS NFR BLD: 0.7 %
DIFFERENTIAL METHOD: ABNORMAL
EOSINOPHIL # BLD AUTO: 0 K/UL
EOSINOPHIL NFR BLD: 0.7 %
ERYTHROCYTE [DISTWIDTH] IN BLOOD BY AUTOMATED COUNT: 13.9 %
HCT VFR BLD AUTO: 38.3 %
HGB BLD-MCNC: 12.1 G/DL
IMM GRANULOCYTES # BLD AUTO: 0.01 K/UL
IMM GRANULOCYTES NFR BLD AUTO: 0.2 %
LYMPHOCYTES # BLD AUTO: 0.8 K/UL
LYMPHOCYTES NFR BLD: 20 %
MCH RBC QN AUTO: 31.3 PG
MCHC RBC AUTO-ENTMCNC: 31.6 G/DL
MCV RBC AUTO: 99 FL
MONOCYTES # BLD AUTO: 0.4 K/UL
MONOCYTES NFR BLD: 9.7 %
NEUTROPHILS # BLD AUTO: 2.8 K/UL
NEUTROPHILS NFR BLD: 68.7 %
NRBC BLD-RTO: 0 /100 WBC
PLATELET # BLD AUTO: 212 K/UL
PMV BLD AUTO: 10.1 FL
RBC # BLD AUTO: 3.87 M/UL
WBC # BLD AUTO: 4.11 K/UL

## 2017-11-08 PROCEDURE — 85025 COMPLETE CBC W/AUTO DIFF WBC: CPT

## 2017-11-08 PROCEDURE — 36415 COLL VENOUS BLD VENIPUNCTURE: CPT | Mod: PO

## 2017-11-16 ENCOUNTER — LAB VISIT (OUTPATIENT)
Dept: LAB | Facility: HOSPITAL | Age: 52
End: 2017-11-16
Attending: INTERNAL MEDICINE
Payer: COMMERCIAL

## 2017-11-16 DIAGNOSIS — M32.9 SLE (SYSTEMIC LUPUS ERYTHEMATOSUS): ICD-10-CM

## 2017-11-16 LAB
BASOPHILS # BLD AUTO: 0.04 K/UL
BASOPHILS NFR BLD: 1.1 %
DIFFERENTIAL METHOD: ABNORMAL
EOSINOPHIL # BLD AUTO: 0 K/UL
EOSINOPHIL NFR BLD: 1.1 %
ERYTHROCYTE [DISTWIDTH] IN BLOOD BY AUTOMATED COUNT: 13.6 %
HCT VFR BLD AUTO: 37.1 %
HGB BLD-MCNC: 12 G/DL
IMM GRANULOCYTES # BLD AUTO: 0.02 K/UL
IMM GRANULOCYTES NFR BLD AUTO: 0.5 %
LYMPHOCYTES # BLD AUTO: 0.9 K/UL
LYMPHOCYTES NFR BLD: 23.8 %
MCH RBC QN AUTO: 31.8 PG
MCHC RBC AUTO-ENTMCNC: 32.3 G/DL
MCV RBC AUTO: 98 FL
MONOCYTES # BLD AUTO: 0.4 K/UL
MONOCYTES NFR BLD: 11 %
NEUTROPHILS # BLD AUTO: 2.3 K/UL
NEUTROPHILS NFR BLD: 62.5 %
NRBC BLD-RTO: 0 /100 WBC
PLATELET # BLD AUTO: 197 K/UL
PMV BLD AUTO: 9.8 FL
RBC # BLD AUTO: 3.77 M/UL
WBC # BLD AUTO: 3.74 K/UL

## 2017-11-16 PROCEDURE — 36415 COLL VENOUS BLD VENIPUNCTURE: CPT | Mod: PO

## 2017-11-16 PROCEDURE — 85025 COMPLETE CBC W/AUTO DIFF WBC: CPT

## 2017-11-17 ENCOUNTER — TELEPHONE (OUTPATIENT)
Dept: RHEUMATOLOGY | Facility: CLINIC | Age: 52
End: 2017-11-17

## 2017-11-28 ENCOUNTER — LAB VISIT (OUTPATIENT)
Dept: LAB | Facility: HOSPITAL | Age: 52
End: 2017-11-28
Attending: INTERNAL MEDICINE
Payer: COMMERCIAL

## 2017-11-28 DIAGNOSIS — M32.9 SLE (SYSTEMIC LUPUS ERYTHEMATOSUS): ICD-10-CM

## 2017-11-28 LAB
ALBUMIN SERPL BCP-MCNC: 3.6 G/DL
ALP SERPL-CCNC: 56 U/L
ALT SERPL W/O P-5'-P-CCNC: 10 U/L
ANION GAP SERPL CALC-SCNC: 6 MMOL/L
AST SERPL-CCNC: 13 U/L
BASOPHILS # BLD AUTO: 0.02 K/UL
BASOPHILS NFR BLD: 0.5 %
BILIRUB SERPL-MCNC: 0.5 MG/DL
BUN SERPL-MCNC: 17 MG/DL
CALCIUM SERPL-MCNC: 9.1 MG/DL
CHLORIDE SERPL-SCNC: 104 MMOL/L
CO2 SERPL-SCNC: 30 MMOL/L
CREAT SERPL-MCNC: 0.8 MG/DL
DIFFERENTIAL METHOD: ABNORMAL
EOSINOPHIL # BLD AUTO: 0.1 K/UL
EOSINOPHIL NFR BLD: 1.2 %
ERYTHROCYTE [DISTWIDTH] IN BLOOD BY AUTOMATED COUNT: 13.5 %
EST. GFR  (AFRICAN AMERICAN): >60 ML/MIN/1.73 M^2
EST. GFR  (NON AFRICAN AMERICAN): >60 ML/MIN/1.73 M^2
GLUCOSE SERPL-MCNC: 80 MG/DL
HCT VFR BLD AUTO: 41.7 %
HGB BLD-MCNC: 13.5 G/DL
IMM GRANULOCYTES # BLD AUTO: 0.01 K/UL
IMM GRANULOCYTES NFR BLD AUTO: 0.2 %
LYMPHOCYTES # BLD AUTO: 0.9 K/UL
LYMPHOCYTES NFR BLD: 20.3 %
MCH RBC QN AUTO: 31.7 PG
MCHC RBC AUTO-ENTMCNC: 32.4 G/DL
MCV RBC AUTO: 98 FL
MONOCYTES # BLD AUTO: 0.5 K/UL
MONOCYTES NFR BLD: 11.1 %
NEUTROPHILS # BLD AUTO: 2.9 K/UL
NEUTROPHILS NFR BLD: 66.7 %
NRBC BLD-RTO: 0 /100 WBC
PLATELET # BLD AUTO: 220 K/UL
PMV BLD AUTO: 10 FL
POTASSIUM SERPL-SCNC: 3.6 MMOL/L
PROT SERPL-MCNC: 6.9 G/DL
RBC # BLD AUTO: 4.26 M/UL
SODIUM SERPL-SCNC: 140 MMOL/L
WBC # BLD AUTO: 4.33 K/UL

## 2017-11-28 PROCEDURE — 85025 COMPLETE CBC W/AUTO DIFF WBC: CPT

## 2017-11-28 PROCEDURE — 36415 COLL VENOUS BLD VENIPUNCTURE: CPT | Mod: PO

## 2017-11-28 PROCEDURE — 80053 COMPREHEN METABOLIC PANEL: CPT

## 2017-12-11 ENCOUNTER — LAB VISIT (OUTPATIENT)
Dept: LAB | Facility: HOSPITAL | Age: 52
End: 2017-12-11
Attending: INTERNAL MEDICINE
Payer: COMMERCIAL

## 2017-12-11 DIAGNOSIS — M32.9 SLE (SYSTEMIC LUPUS ERYTHEMATOSUS): ICD-10-CM

## 2017-12-11 LAB
BASOPHILS # BLD AUTO: 0.03 K/UL
BASOPHILS NFR BLD: 0.8 %
DIFFERENTIAL METHOD: ABNORMAL
EOSINOPHIL # BLD AUTO: 0.1 K/UL
EOSINOPHIL NFR BLD: 1.6 %
ERYTHROCYTE [DISTWIDTH] IN BLOOD BY AUTOMATED COUNT: 13.3 %
HCT VFR BLD AUTO: 38.7 %
HGB BLD-MCNC: 12.6 G/DL
IMM GRANULOCYTES # BLD AUTO: 0.01 K/UL
IMM GRANULOCYTES NFR BLD AUTO: 0.3 %
LYMPHOCYTES # BLD AUTO: 1 K/UL
LYMPHOCYTES NFR BLD: 24.9 %
MCH RBC QN AUTO: 31 PG
MCHC RBC AUTO-ENTMCNC: 32.6 G/DL
MCV RBC AUTO: 95 FL
MONOCYTES # BLD AUTO: 0.5 K/UL
MONOCYTES NFR BLD: 12.5 %
NEUTROPHILS # BLD AUTO: 2.3 K/UL
NEUTROPHILS NFR BLD: 59.9 %
NRBC BLD-RTO: 0 /100 WBC
PLATELET # BLD AUTO: 194 K/UL
PMV BLD AUTO: 10.4 FL
RBC # BLD AUTO: 4.07 M/UL
WBC # BLD AUTO: 3.85 K/UL

## 2017-12-11 PROCEDURE — 85025 COMPLETE CBC W/AUTO DIFF WBC: CPT

## 2017-12-11 PROCEDURE — 36415 COLL VENOUS BLD VENIPUNCTURE: CPT | Mod: PO

## 2017-12-12 ENCOUNTER — TELEPHONE (OUTPATIENT)
Dept: SPINE | Facility: CLINIC | Age: 52
End: 2017-12-12

## 2017-12-12 RX ORDER — METHOTREXATE 2.5 MG/1
TABLET ORAL
COMMUNITY
Start: 2017-09-25 | End: 2018-01-10

## 2017-12-12 RX ORDER — METHOCARBAMOL 500 MG/1
500 TABLET, FILM COATED ORAL 4 TIMES DAILY
Qty: 120 TABLET | Refills: 2 | Status: SHIPPED | OUTPATIENT
Start: 2017-12-12 | End: 2018-01-26

## 2017-12-12 RX ORDER — AZATHIOPRINE 50 MG/1
50 TABLET ORAL
COMMUNITY
Start: 2017-11-06 | End: 2018-01-09 | Stop reason: SDUPTHER

## 2017-12-12 NOTE — TELEPHONE ENCOUNTER
Please advise   ----- Message from Flo Fuchs sent at 12/12/2017 3:50 PM CST -----  Contact: Madeleine Nunez  X_  1st Request  _  2nd Request  _  3rd Request        Who: Madeleine Nunez    Why: Patient would like to be prescribed medication for discomfort while sitting. Patient also would like to schedule an appt for January. Unable to schedule for January    What Number to Call Back: [phone]    When to Expect a call back: (Within 24 hours)    Please return the call at earliest convenience. Thanks!

## 2017-12-12 NOTE — TELEPHONE ENCOUNTER
She is working on stretching and she feels better, but still has some leg pain with sitting and sleeping.  She will try the muscle relaxer

## 2017-12-13 DIAGNOSIS — M32.19 OTHER SYSTEMIC LUPUS ERYTHEMATOSUS WITH OTHER ORGAN INVOLVEMENT: ICD-10-CM

## 2017-12-13 RX ORDER — AZATHIOPRINE 50 MG/1
TABLET ORAL
Qty: 60 TABLET | Refills: 0 | Status: SHIPPED | OUTPATIENT
Start: 2017-12-13 | End: 2018-01-09 | Stop reason: SDUPTHER

## 2017-12-22 ENCOUNTER — LAB VISIT (OUTPATIENT)
Dept: LAB | Facility: HOSPITAL | Age: 52
End: 2017-12-22
Attending: INTERNAL MEDICINE
Payer: COMMERCIAL

## 2017-12-22 DIAGNOSIS — M32.9 SLE (SYSTEMIC LUPUS ERYTHEMATOSUS): ICD-10-CM

## 2017-12-22 LAB
ALBUMIN SERPL BCP-MCNC: 3.3 G/DL
ALP SERPL-CCNC: 67 U/L
ALT SERPL W/O P-5'-P-CCNC: 9 U/L
ANION GAP SERPL CALC-SCNC: 7 MMOL/L
AST SERPL-CCNC: 17 U/L
BASOPHILS # BLD AUTO: 0.03 K/UL
BASOPHILS NFR BLD: 0.5 %
BILIRUB SERPL-MCNC: 0.5 MG/DL
BUN SERPL-MCNC: 20 MG/DL
CALCIUM SERPL-MCNC: 9.1 MG/DL
CHLORIDE SERPL-SCNC: 107 MMOL/L
CO2 SERPL-SCNC: 29 MMOL/L
CREAT SERPL-MCNC: 0.9 MG/DL
DIFFERENTIAL METHOD: ABNORMAL
EOSINOPHIL # BLD AUTO: 0 K/UL
EOSINOPHIL NFR BLD: 0.2 %
ERYTHROCYTE [DISTWIDTH] IN BLOOD BY AUTOMATED COUNT: 13 %
EST. GFR  (AFRICAN AMERICAN): >60 ML/MIN/1.73 M^2
EST. GFR  (NON AFRICAN AMERICAN): >60 ML/MIN/1.73 M^2
GLUCOSE SERPL-MCNC: 74 MG/DL
HCT VFR BLD AUTO: 38.8 %
HGB BLD-MCNC: 12.9 G/DL
IMM GRANULOCYTES # BLD AUTO: 0.04 K/UL
IMM GRANULOCYTES NFR BLD AUTO: 0.6 %
LYMPHOCYTES # BLD AUTO: 1.1 K/UL
LYMPHOCYTES NFR BLD: 17.3 %
MCH RBC QN AUTO: 31.5 PG
MCHC RBC AUTO-ENTMCNC: 33.2 G/DL
MCV RBC AUTO: 95 FL
MONOCYTES # BLD AUTO: 0.6 K/UL
MONOCYTES NFR BLD: 8.5 %
NEUTROPHILS # BLD AUTO: 4.7 K/UL
NEUTROPHILS NFR BLD: 72.9 %
NRBC BLD-RTO: 0 /100 WBC
PLATELET # BLD AUTO: 191 K/UL
PMV BLD AUTO: 10.4 FL
POTASSIUM SERPL-SCNC: 3.3 MMOL/L
PROT SERPL-MCNC: 6.6 G/DL
RBC # BLD AUTO: 4.09 M/UL
SODIUM SERPL-SCNC: 143 MMOL/L
WBC # BLD AUTO: 6.48 K/UL

## 2017-12-22 PROCEDURE — 36415 COLL VENOUS BLD VENIPUNCTURE: CPT | Mod: PO

## 2017-12-22 PROCEDURE — 85025 COMPLETE CBC W/AUTO DIFF WBC: CPT

## 2017-12-22 PROCEDURE — 80053 COMPREHEN METABOLIC PANEL: CPT

## 2018-01-09 ENCOUNTER — CLINICAL SUPPORT (OUTPATIENT)
Dept: INFECTIOUS DISEASES | Facility: CLINIC | Age: 53
End: 2018-01-09
Payer: COMMERCIAL

## 2018-01-09 ENCOUNTER — LAB VISIT (OUTPATIENT)
Dept: LAB | Facility: HOSPITAL | Age: 53
End: 2018-01-09
Attending: INTERNAL MEDICINE
Payer: COMMERCIAL

## 2018-01-09 ENCOUNTER — OFFICE VISIT (OUTPATIENT)
Dept: RHEUMATOLOGY | Facility: CLINIC | Age: 53
End: 2018-01-09
Payer: COMMERCIAL

## 2018-01-09 VITALS
HEART RATE: 66 BPM | SYSTOLIC BLOOD PRESSURE: 90 MMHG | HEIGHT: 64 IN | BODY MASS INDEX: 22.96 KG/M2 | DIASTOLIC BLOOD PRESSURE: 62 MMHG | WEIGHT: 134.5 LBS

## 2018-01-09 DIAGNOSIS — Z79.624 ENCOUNTER FOR LONG TERM CURRENT USE OF AZATHIOPRINE: Primary | ICD-10-CM

## 2018-01-09 DIAGNOSIS — M32.9 SLE (SYSTEMIC LUPUS ERYTHEMATOSUS): ICD-10-CM

## 2018-01-09 DIAGNOSIS — Z23 NEED FOR VACCINATION: Primary | ICD-10-CM

## 2018-01-09 DIAGNOSIS — M32.19 OTHER SYSTEMIC LUPUS ERYTHEMATOSUS WITH OTHER ORGAN INVOLVEMENT: ICD-10-CM

## 2018-01-09 DIAGNOSIS — Z78.0 MENOPAUSE: ICD-10-CM

## 2018-01-09 DIAGNOSIS — Z79.624 ENCOUNTER FOR LONG TERM CURRENT USE OF AZATHIOPRINE: ICD-10-CM

## 2018-01-09 DIAGNOSIS — R06.02 SHORTNESS OF BREATH: ICD-10-CM

## 2018-01-09 DIAGNOSIS — M54.16 LEFT LUMBAR RADICULOPATHY: ICD-10-CM

## 2018-01-09 LAB
ALBUMIN SERPL BCP-MCNC: 3.4 G/DL
ALP SERPL-CCNC: 77 U/L
ALT SERPL W/O P-5'-P-CCNC: 8 U/L
ANION GAP SERPL CALC-SCNC: 6 MMOL/L
AST SERPL-CCNC: 16 U/L
BACTERIA #/AREA URNS AUTO: NORMAL /HPF
BASOPHILS # BLD AUTO: 0.04 K/UL
BASOPHILS NFR BLD: 0.8 %
BILIRUB SERPL-MCNC: 0.3 MG/DL
BILIRUB UR QL STRIP: NEGATIVE
BUN SERPL-MCNC: 21 MG/DL
C3 SERPL-MCNC: 98 MG/DL
C4 SERPL-MCNC: 9 MG/DL
CALCIUM SERPL-MCNC: 9 MG/DL
CHLORIDE SERPL-SCNC: 104 MMOL/L
CLARITY UR REFRACT.AUTO: ABNORMAL
CO2 SERPL-SCNC: 32 MMOL/L
COLOR UR AUTO: YELLOW
CREAT SERPL-MCNC: 0.8 MG/DL
CREAT UR-MCNC: 263 MG/DL
CRP SERPL-MCNC: 0.8 MG/L
DIFFERENTIAL METHOD: ABNORMAL
EOSINOPHIL # BLD AUTO: 0.1 K/UL
EOSINOPHIL NFR BLD: 1.7 %
ERYTHROCYTE [DISTWIDTH] IN BLOOD BY AUTOMATED COUNT: 12.4 %
ERYTHROCYTE [SEDIMENTATION RATE] IN BLOOD BY WESTERGREN METHOD: 14 MM/HR
EST. GFR  (AFRICAN AMERICAN): >60 ML/MIN/1.73 M^2
EST. GFR  (NON AFRICAN AMERICAN): >60 ML/MIN/1.73 M^2
GLUCOSE SERPL-MCNC: 43 MG/DL
GLUCOSE UR QL STRIP: NEGATIVE
HCT VFR BLD AUTO: 39.1 %
HGB BLD-MCNC: 13.2 G/DL
HGB UR QL STRIP: NEGATIVE
IMM GRANULOCYTES # BLD AUTO: 0.01 K/UL
IMM GRANULOCYTES NFR BLD AUTO: 0.2 %
KETONES UR QL STRIP: NEGATIVE
LEUKOCYTE ESTERASE UR QL STRIP: ABNORMAL
LYMPHOCYTES # BLD AUTO: 0.9 K/UL
LYMPHOCYTES NFR BLD: 19.4 %
MCH RBC QN AUTO: 30.9 PG
MCHC RBC AUTO-ENTMCNC: 33.8 G/DL
MCV RBC AUTO: 92 FL
MICROSCOPIC COMMENT: NORMAL
MONOCYTES # BLD AUTO: 0.6 K/UL
MONOCYTES NFR BLD: 11.6 %
NEUTROPHILS # BLD AUTO: 3.1 K/UL
NEUTROPHILS NFR BLD: 66.3 %
NITRITE UR QL STRIP: NEGATIVE
NRBC BLD-RTO: 0 /100 WBC
PH UR STRIP: 5 [PH] (ref 5–8)
PLATELET # BLD AUTO: 213 K/UL
PMV BLD AUTO: 10.3 FL
POTASSIUM SERPL-SCNC: 3.2 MMOL/L
PROT SERPL-MCNC: 6.9 G/DL
PROT UR QL STRIP: NEGATIVE
PROT UR-MCNC: 11 MG/DL
PROT/CREAT RATIO, UR: 0.04
RBC # BLD AUTO: 4.27 M/UL
RBC #/AREA URNS AUTO: 0 /HPF (ref 0–4)
SODIUM SERPL-SCNC: 142 MMOL/L
SP GR UR STRIP: 1.02 (ref 1–1.03)
SQUAMOUS #/AREA URNS AUTO: 3 /HPF
URN SPEC COLLECT METH UR: ABNORMAL
UROBILINOGEN UR STRIP-ACNC: NEGATIVE EU/DL
WBC # BLD AUTO: 4.74 K/UL
WBC #/AREA URNS AUTO: 3 /HPF (ref 0–5)

## 2018-01-09 PROCEDURE — 81001 URINALYSIS AUTO W/SCOPE: CPT

## 2018-01-09 PROCEDURE — 84156 ASSAY OF PROTEIN URINE: CPT

## 2018-01-09 PROCEDURE — 86160 COMPLEMENT ANTIGEN: CPT

## 2018-01-09 PROCEDURE — 86225 DNA ANTIBODY NATIVE: CPT

## 2018-01-09 PROCEDURE — 99999 PR PBB SHADOW E&M-EST. PATIENT-LVL III: CPT | Mod: PBBFAC,,, | Performed by: INTERNAL MEDICINE

## 2018-01-09 PROCEDURE — 86160 COMPLEMENT ANTIGEN: CPT | Mod: 59

## 2018-01-09 PROCEDURE — 86703 HIV-1/HIV-2 1 RESULT ANTBDY: CPT

## 2018-01-09 PROCEDURE — 85025 COMPLETE CBC W/AUTO DIFF WBC: CPT

## 2018-01-09 PROCEDURE — 86140 C-REACTIVE PROTEIN: CPT

## 2018-01-09 PROCEDURE — 90471 IMMUNIZATION ADMIN: CPT | Mod: S$GLB,,, | Performed by: INTERNAL MEDICINE

## 2018-01-09 PROCEDURE — 80053 COMPREHEN METABOLIC PANEL: CPT

## 2018-01-09 PROCEDURE — 85651 RBC SED RATE NONAUTOMATED: CPT

## 2018-01-09 PROCEDURE — 86704 HEP B CORE ANTIBODY TOTAL: CPT

## 2018-01-09 PROCEDURE — 90686 IIV4 VACC NO PRSV 0.5 ML IM: CPT | Mod: S$GLB,,, | Performed by: INTERNAL MEDICINE

## 2018-01-09 PROCEDURE — 86706 HEP B SURFACE ANTIBODY: CPT

## 2018-01-09 PROCEDURE — 99213 OFFICE O/P EST LOW 20 MIN: CPT | Mod: S$GLB,,, | Performed by: INTERNAL MEDICINE

## 2018-01-09 PROCEDURE — 36415 COLL VENOUS BLD VENIPUNCTURE: CPT | Mod: PO

## 2018-01-09 PROCEDURE — 86803 HEPATITIS C AB TEST: CPT

## 2018-01-09 PROCEDURE — 87340 HEPATITIS B SURFACE AG IA: CPT

## 2018-01-09 PROCEDURE — 86592 SYPHILIS TEST NON-TREP QUAL: CPT

## 2018-01-09 RX ORDER — AZATHIOPRINE 50 MG/1
50 TABLET ORAL 2 TIMES DAILY
Qty: 60 TABLET | Refills: 0 | Status: SHIPPED | OUTPATIENT
Start: 2018-01-09 | End: 2018-02-21 | Stop reason: SDUPTHER

## 2018-01-09 RX ORDER — PREDNISONE 5 MG/1
7.5 TABLET ORAL DAILY
Qty: 45 TABLET | Refills: 2 | Status: SHIPPED | OUTPATIENT
Start: 2018-01-09 | End: 2018-02-08

## 2018-01-09 ASSESSMENT — SYSTEMIC LUPUS ERYTHEMATOSUS DISEASE ACTIVITY INDEX (SLEDAI): TOTAL_SCORE: 0

## 2018-01-09 ASSESSMENT — ROUTINE ASSESSMENT OF PATIENT INDEX DATA (RAPID3)
AM STIFFNESS SCORE: 0, NO
TOTAL RAPID3 SCORE: 0
FATIGUE SCORE: 0
PSYCHOLOGICAL DISTRESS SCORE: 0
PAIN SCORE: 0
PATIENT GLOBAL ASSESSMENT SCORE: 0
MDHAQ FUNCTION SCORE: 0

## 2018-01-09 NOTE — PROGRESS NOTES
I have personally taken the history and examined the patient and agree with the resident,s note as stated above  No longer SOB after stopping Dyazide on her own. Meniere's controlled nevertheless.SLEDAI=0 pending labs. Feels much better than last visit. Tolerating azathioprine 50mg twice daily    Exam is normal    Results for HUBERT RODRIGUEZ (MRN [id number]) as of 1/9/2018 10:18   Ref. Range 12/22/2017 12:18   WBC Latest Ref Range: 3.90 - 12.70 K/uL 6.48   RBC Latest Ref Range: 4.00 - 5.40 M/uL 4.09   Hemoglobin Latest Ref Range: 12.0 - 16.0 g/dL 12.9   Hematocrit Latest Ref Range: 37.0 - 48.5 % 38.8   MCV Latest Ref Range: 82 - 98 fL 95   MCH Latest Ref Range: 27.0 - 31.0 pg 31.5 (H)   MCHC Latest Ref Range: 32.0 - 36.0 g/dL 33.2   RDW Latest Ref Range: 11.5 - 14.5 % 13.0   Platelets Latest Ref Range: 150 - 350 K/uL 191   MPV Latest Ref Range: 9.2 - 12.9 fL 10.4   Gran% Latest Ref Range: 38.0 - 73.0 % 72.9   Gran # Latest Ref Range: 1.8 - 7.7 K/uL 4.7   Immature Granulocytes Latest Ref Range: 0.0 - 0.5 % 0.6 (H)   Immature Grans (Abs) Latest Ref Range: 0.00 - 0.04 K/uL 0.04   Lymph% Latest Ref Range: 18.0 - 48.0 % 17.3 (L)   Lymph # Latest Ref Range: 1.0 - 4.8 K/uL 1.1   Mono% Latest Ref Range: 4.0 - 15.0 % 8.5   Mono # Latest Ref Range: 0.3 - 1.0 K/uL 0.6   Eosinophil% Latest Ref Range: 0.0 - 8.0 % 0.2   Eos # Latest Ref Range: 0.0 - 0.5 K/uL 0.0   Basophil% Latest Ref Range: 0.0 - 1.9 % 0.5   Baso # Latest Ref Range: 0.00 - 0.20 K/uL 0.03   nRBC Latest Ref Range: 0 /100 WBC 0   Sodium Latest Ref Range: 136 - 145 mmol/L 143   Potassium Latest Ref Range: 3.5 - 5.1 mmol/L 3.3 (L)   Chloride Latest Ref Range: 95 - 110 mmol/L 107   CO2 Latest Ref Range: 23 - 29 mmol/L 29   Anion Gap Latest Ref Range: 8 - 16 mmol/L 7 (L)   BUN, Bld Latest Ref Range: 6 - 20 mg/dL 20   Creatinine Latest Ref Range: 0.5 - 1.4 mg/dL 0.9   eGFR if non African American Latest Ref Range: >60 mL/min/1.73 m^2 >60.0   eGFR if African American  Latest Ref Range: >60 mL/min/1.73 m^2 >60.0   Glucose Latest Ref Range: 70 - 110 mg/dL 74   Calcium Latest Ref Range: 8.7 - 10.5 mg/dL 9.1   Alkaline Phosphatase Latest Ref Range: 55 - 135 U/L 67   Total Protein Latest Ref Range: 6.0 - 8.4 g/dL 6.6   Albumin Latest Ref Range: 3.5 - 5.2 g/dL 3.3 (L)   Total Bilirubin Latest Ref Range: 0.1 - 1.0 mg/dL 0.5   AST Latest Ref Range: 10 - 40 U/L 17   ALT Latest Ref Range: 10 - 44 U/L 9 (L)   Differential Method Unknown Automated     Notes Recorded by Wander Bender MD on 10/24/2017 at 5:48 PM CDT  The TPMT shows you should metabolize azathioprine normally. Would start azathioprine 50mg twice daily and Liv will schedule CBC every wk x 4 and then every 2 wks x2 and then monthly. She will also schedule cmp every 4 wks. RJQ    2mo ago   TPMT Genotype Result SEE COMMENT    Comments: See comment   1/1   TPMT *2, *3A, *3B, *3C, *4, *5, *8, and *12 were not   detected.    TPMT Interpretation SEE COMMENT    Comments: See comment   This individual most likely has the *1/*1 genotype and   normal TPMT metabolizer. However there is a low residual   risk that a TPMT variant may be present which is not   detected by this assay and which might affect the patient's   response to thiopurine drugs. Clinical correlation is   recommended.              Viewed by Madeleine Nunez on 10/17/2017  8:17 PM   Written by Wander Bender MD on 10/17/2017  4:01 PM   The chest x-ray is normal. RJQ   External Result Report     External Result Report   Narrative     Time of Procedure: 10/17/17 13:10:00  Accession # [id number]    Comparison: 7/2016.    Number of views: 2.     Findings:  Lung volumes are normal and symmetric.  Mediastinal structures are midline.    I detect no convincing evidence of pulmonary disease, pleural fluid, lymph node enlargement, cardiac decompensation, pneumothorax, pneumomediastinum, pneumoperitoneum or significant osseous abnormality.   Impression       No convincing  evidence of intrathoracic disease identified.      If there is persistent clinical concern for interstitial lung disease below the limits of dictation with chest radiograph, noncontrast chest CT with high resolution imaging might provide further information.      Electronically signed by: Brie Morales MD  Date: 10/17/17  Time: 13:18                 SOB with workouts resolved since stopping Dyazide, no more dizziness, near syncope either  Left sciatica, improving with PT  SLE SLEDAI=0 pending ds-DNA, complements, U/A   Meniere's in remission  Hypotension unchanged off Dyazide!          Standing lupus labs, pre-DMARD labs today  * quadrivalent influenza vaccine today as previously ordered.  [facility]  continue azathioprine 50mg twice daily   Decrease prednisone to 7.5mg daily x 1month, then decrease to 5mg daily and continue  RTC 3 months with standing lupus labs

## 2018-01-09 NOTE — PROGRESS NOTES
Subjective:       Patient ID: Madeleine Nunez is a 52 y.o. female.    Chief Complaint: Three month follow up for management of SLE     Pt is doing very well today. She has not had any flare ups in the last three months since previous visit. Subjective portion of her SLEDAI questionnaire was negative. She has been doing well on her current regimen with increased prednisone, nil methotrexate and azathioprine introduction. She complains of some pririformis pain and weakness on the left side from that condition. She is managing the pain and weakness well with exercises and stretching. She is being followed by PMR for this condition. She has also stopped taking her dyazide for her Meniere's as the dizziness and light-headedness she felt from that medication was more bothersome than the Meniere's symptoms, of which she has not had any.     She denies having a flu vaccination since last visit.       Review of Systems   Constitutional: Negative for fever and unexpected weight change.   HENT: Negative for ear pain, sinus pressure, sneezing and sore throat.    Eyes: Negative for pain and visual disturbance.   Respiratory: Negative for cough, chest tightness, shortness of breath and wheezing.    Cardiovascular: Negative for chest pain, palpitations and leg swelling.   Gastrointestinal: Negative for abdominal pain, constipation, diarrhea, nausea and vomiting.   Endocrine: Negative for polydipsia and polyuria.   Genitourinary: Negative for decreased urine volume, difficulty urinating, dysuria and urgency.   Musculoskeletal: Negative for arthralgias and myalgias.   Skin: Negative for color change and rash.   Allergic/Immunologic: Negative for environmental allergies and food allergies.   Neurological: Negative for dizziness, syncope, weakness, light-headedness and headaches.   Psychiatric/Behavioral: Negative for confusion and dysphoric mood. The patient is not nervous/anxious.          Objective:   BP 90/62   Pulse 66   Ht  "5' 3.6" (1.615 m)   Wt 61 kg (134 lb 8 oz)   LMP 08/18/2013   BMI 23.38 kg/m²      Physical Exam   Constitutional: She is oriented to person, place, and time and well-developed, well-nourished, and in no distress.   HENT:   Head: Normocephalic and atraumatic.   Eyes: EOM are normal. Pupils are equal, round, and reactive to light.   Neck: Normal range of motion. Neck supple.   Cardiovascular: Normal rate, regular rhythm, normal heart sounds and intact distal pulses.    Pulmonary/Chest: Effort normal and breath sounds normal. No respiratory distress. She has no wheezes.   Abdominal: Soft. Bowel sounds are normal. She exhibits no distension. There is no tenderness.       Right Side Rheumatological Exam     Examination finds the elbow, 1st PIP, 1st MCP, 2nd PIP, 2nd MCP, 3rd PIP, 3rd MCP, 4th PIP, 4th MCP, 5th PIP and 5th MCP normal.    Muscle Strength (0-5 scale):  Neck Flexion:  5  Neck Extension: 5  Biceps: 5/5   Triceps:  5  : 5/5   Iliopsoas: 5  Quadriceps:  5     Left Side Rheumatological Exam     Examination finds the elbow, 1st PIP, 1st MCP, 2nd PIP, 2nd MCP, 3rd PIP, 3rd MCP, 4th PIP, 4th MCP, 5th PIP and 5th MCP normal.    Muscle Strength (0-5 scale):  Neck Flexion:  5  Neck Extension: 5  Biceps: 5/5   Triceps:  5  :  5/5   Iliopsoas: 5  Quadriceps:  5       Neurological: She is oriented to person, place, and time. No cranial nerve deficit.   Skin: Skin is warm and dry. She is not diaphoretic. No erythema.     Psychiatric: Mood and affect normal.   Musculoskeletal: Normal range of motion. She exhibits no tenderness.           Assessment:       1. Encounter for long term current use of azathioprine    2. Left lumbar radiculopathy    3. Shortness of breath    4. Other systemic lupus erythematosus with other organ involvement    5. Menopause          SLEDAI Score: 0  Seizure: Not present  Psychosis: Not present  Organic Brain Syndrome: Not present  Visual Disturbance: Not present  Cranial Nerve " Disorder: Not present  Lupus Headache: Not present  CVA: Not present  Vasculitis: Not present  Arthritis: Not present  Myositis: Not present  Urinary Casts: Not present  Hematuria: Not present  Proteinuria: Not present  Pyuria: Not present  New Rash: Not present  Alopecia: Not present  Mucosal Ulcers: Not present  Pleurisy: Not present  Pericarditis: Not present  Low Complement: Not present  Increased DNA Binding: Not present  Fever: Not present  Thrombocytopenia: Not present  Leukopenia: Not present    Plan:       Diagnoses and all orders for this visit:    Encounter for long term current use of azathioprine  -     HIV-1 and HIV-2 antibodies; Future  -     Hepatitis B surface antigen; Future  -     HBcAB; Future  -     Hepatitis B surface antibody; Future  -     Hepatitis C antibody; Future  -     Quantiferon Gold TB; Future  -     RPR; Future    Left lumbar radiculopathy   -  Currently being followed by PMR for this condition.   - Doing well otherwise.     Shortness of breath   - Resolved with removal of her dyazide diuretic.    - Symptoms would predominate during exercise, which would exacerbate her adverse effects from her diuretic.    Other systemic lupus erythematosus with other organ involvement  -     Will obtain the following labs d/t initiation of azathioprine and immunosuppression from steroid use.    - HIV-1 and HIV-2 antibodies; Future  -     Hepatitis B surface antigen; Future  -     HBcAB; Future  -     Hepatitis B surface antibody; Future  -     Hepatitis C antibody; Future  -     Quantiferon Gold TB; Future  -     RPR; Future  -     predniSONE (DELTASONE) 5 MG tablet; Take 1.5 tablets (7.5 mg total) by mouth once daily. Take one tablet per day after completing the first 30 days.  -     azaTHIOprine (IMURAN) 50 mg Tab; Take 1 tablet (50 mg total) by mouth 2 (two) times daily.    Menopause  -     Pt due for bone density scan.   - DXA Bone Density Spine And Hip; Future      Pt to decrease prednisone  dosage to 7.5 mg per day for one month and then taper down to 5mg thereafter. RTC in three months. Case discussed with Dr Bender of rheumatology.    Behram Khan, MD  Internal Medicine, PGY-1  #[phone]

## 2018-01-10 ENCOUNTER — TELEPHONE (OUTPATIENT)
Dept: RHEUMATOLOGY | Facility: CLINIC | Age: 53
End: 2018-01-10

## 2018-01-10 DIAGNOSIS — E16.2 HYPOGLYCEMIA: ICD-10-CM

## 2018-01-10 DIAGNOSIS — E87.6 HYPOKALEMIA: Primary | ICD-10-CM

## 2018-01-10 LAB
DSDNA AB SER-ACNC: NORMAL [IU]/ML
HBV CORE AB SERPL QL IA: NEGATIVE
HBV SURFACE AB SER-ACNC: NEGATIVE M[IU]/ML
HBV SURFACE AG SERPL QL IA: NEGATIVE
HCV AB SERPL QL IA: NEGATIVE
HIV 1+2 AB+HIV1 P24 AG SERPL QL IA: NEGATIVE
RPR SER QL: NORMAL

## 2018-01-10 RX ORDER — POTASSIUM CHLORIDE 20 MEQ/1
20 TABLET, EXTENDED RELEASE ORAL 2 TIMES DAILY
Qty: 60 TABLET | Refills: 2 | Status: SHIPPED | OUTPATIENT
Start: 2018-01-10 | End: 2018-01-26

## 2018-01-10 NOTE — TELEPHONE ENCOUNTER
Please schedule repeat K and FBS, HbA1C in 2 wks. Please schedule endocrine consult for hypoglycemia. Also tell pt Rx for K-Dur sent to pharmacy. Take once twice daily x 1wk, then decrease to once daily and continue. MINA

## 2018-01-11 ENCOUNTER — PATIENT MESSAGE (OUTPATIENT)
Dept: RHEUMATOLOGY | Facility: CLINIC | Age: 53
End: 2018-01-11

## 2018-01-23 ENCOUNTER — PATIENT MESSAGE (OUTPATIENT)
Dept: RHEUMATOLOGY | Facility: CLINIC | Age: 53
End: 2018-01-23

## 2018-01-24 ENCOUNTER — LAB VISIT (OUTPATIENT)
Dept: LAB | Facility: HOSPITAL | Age: 53
End: 2018-01-24
Attending: INTERNAL MEDICINE
Payer: COMMERCIAL

## 2018-01-24 DIAGNOSIS — E87.6 HYPOKALEMIA: ICD-10-CM

## 2018-01-24 DIAGNOSIS — E16.2 HYPOGLYCEMIA: ICD-10-CM

## 2018-01-24 LAB
ESTIMATED AVG GLUCOSE: 103 MG/DL
GLUCOSE SERPL-MCNC: 70 MG/DL
HBA1C MFR BLD HPLC: 5.2 %
POTASSIUM SERPL-SCNC: 3.6 MMOL/L

## 2018-01-24 PROCEDURE — 83036 HEMOGLOBIN GLYCOSYLATED A1C: CPT

## 2018-01-24 PROCEDURE — 36415 COLL VENOUS BLD VENIPUNCTURE: CPT | Mod: PO

## 2018-01-24 PROCEDURE — 84132 ASSAY OF SERUM POTASSIUM: CPT

## 2018-01-24 PROCEDURE — 82947 ASSAY GLUCOSE BLOOD QUANT: CPT

## 2018-01-26 ENCOUNTER — OFFICE VISIT (OUTPATIENT)
Dept: OPHTHALMOLOGY | Facility: CLINIC | Age: 53
End: 2018-01-26
Payer: COMMERCIAL

## 2018-01-26 DIAGNOSIS — M35.00 SICCA SYNDROME: Primary | ICD-10-CM

## 2018-01-26 DIAGNOSIS — H04.123 DRY EYE SYNDROME, BILATERAL: ICD-10-CM

## 2018-01-26 DIAGNOSIS — M32.19 OTHER SYSTEMIC LUPUS ERYTHEMATOSUS WITH OTHER ORGAN INVOLVEMENT: ICD-10-CM

## 2018-01-26 DIAGNOSIS — Z83.511 FAMILY HISTORY OF OPEN-ANGLE GLAUCOMA: ICD-10-CM

## 2018-01-26 PROCEDURE — 92004 COMPRE OPH EXAM NEW PT 1/>: CPT | Mod: S$GLB,,, | Performed by: OPHTHALMOLOGY

## 2018-01-26 PROCEDURE — 99999 PR PBB SHADOW E&M-EST. PATIENT-LVL II: CPT | Mod: PBBFAC,,, | Performed by: OPHTHALMOLOGY

## 2018-01-26 NOTE — PROGRESS NOTES
HPI     Glaucoma Suspect    Additional comments: lost to follow up, last seen on 6/10/14           Dry Eye    Additional comments: c/o red, glassy eyes.            Comments   Pt is on oral prednisone and feel ou really read, feels glassy, like fb   sensation     COAG Suspect  FMHX COAG  Plaquenil Therapy stopped 9/12  Pt taking Imuran po and Pred PO    Systane TID/QID  OU        Last edited by Sea Gusman MD on 1/26/2018 11:00 AM. (History)            Assessment /Plan     For exam results, see Encounter Report.      ICD-10-CM ICD-9-CM    1. Sicca syndrome M35.00 710.2 lifitegrast (XIIDRA) 5 % Dpet  Findings and symptoms consistent with advanced dry eyes. Dry eye instruction sheet provided. Options discussed including the use of punctal plugs, [place] therapy, Restasis, and the use of preservative free products, as well as to use conservative management.     Patient elects to use :  Omega 3 fish oils of [employer] or PRN Dry Eye formula [employer]  Systane Ultra  Genteal Gel at bedtime  Start Xiidra BID OU            2. Other systemic lupus erythematosus with other organ involvement M32.19 710.0 Followed by PCP_ pt has worsening dry eye today    3. Family history of open-angle glaucoma Z83.511 V19.11 Follow, no evidence of glaucoma at this time    4. Dry eye syndrome, bilateral H04.123 375.15 lifitegrast (XIIDRA) 5 % Dpet  See number 1          Start Xiidra BID OU and the above treatment     Return to clinic 2 MONTH [place] Dryness and IOP

## 2018-02-05 ENCOUNTER — PATIENT MESSAGE (OUTPATIENT)
Dept: RHEUMATOLOGY | Facility: CLINIC | Age: 53
End: 2018-02-05

## 2018-02-21 DIAGNOSIS — M32.19 OTHER SYSTEMIC LUPUS ERYTHEMATOSUS WITH OTHER ORGAN INVOLVEMENT: ICD-10-CM

## 2018-02-22 RX ORDER — AZATHIOPRINE 50 MG/1
50 TABLET ORAL 2 TIMES DAILY
Qty: 60 TABLET | Refills: 1 | Status: SHIPPED | OUTPATIENT
Start: 2018-02-22 | End: 2018-04-25 | Stop reason: SDUPTHER

## 2018-04-16 ENCOUNTER — PATIENT MESSAGE (OUTPATIENT)
Dept: RHEUMATOLOGY | Facility: CLINIC | Age: 53
End: 2018-04-16

## 2018-04-19 ENCOUNTER — LAB VISIT (OUTPATIENT)
Dept: LAB | Facility: HOSPITAL | Age: 53
End: 2018-04-19
Attending: INTERNAL MEDICINE
Payer: COMMERCIAL

## 2018-04-19 DIAGNOSIS — M32.9 SLE (SYSTEMIC LUPUS ERYTHEMATOSUS): ICD-10-CM

## 2018-04-19 LAB
ALBUMIN SERPL BCP-MCNC: 3.7 G/DL
ALP SERPL-CCNC: 70 U/L
ALT SERPL W/O P-5'-P-CCNC: 8 U/L
ANION GAP SERPL CALC-SCNC: 9 MMOL/L
AST SERPL-CCNC: 15 U/L
BASOPHILS # BLD AUTO: 0.03 K/UL
BASOPHILS NFR BLD: 0.6 %
BILIRUB SERPL-MCNC: 0.7 MG/DL
BUN SERPL-MCNC: 23 MG/DL
C3 SERPL-MCNC: 101 MG/DL
C4 SERPL-MCNC: 11 MG/DL
CALCIUM SERPL-MCNC: 9.5 MG/DL
CHLORIDE SERPL-SCNC: 105 MMOL/L
CO2 SERPL-SCNC: 27 MMOL/L
CREAT SERPL-MCNC: 0.8 MG/DL
CRP SERPL-MCNC: 0.7 MG/L
DIFFERENTIAL METHOD: ABNORMAL
EOSINOPHIL # BLD AUTO: 0 K/UL
EOSINOPHIL NFR BLD: 0.7 %
ERYTHROCYTE [DISTWIDTH] IN BLOOD BY AUTOMATED COUNT: 13.5 %
ERYTHROCYTE [SEDIMENTATION RATE] IN BLOOD BY WESTERGREN METHOD: 5 MM/HR
EST. GFR  (AFRICAN AMERICAN): >60 ML/MIN/1.73 M^2
EST. GFR  (NON AFRICAN AMERICAN): >60 ML/MIN/1.73 M^2
GLUCOSE SERPL-MCNC: 59 MG/DL
HCT VFR BLD AUTO: 43.4 %
HGB BLD-MCNC: 13.8 G/DL
IMM GRANULOCYTES # BLD AUTO: 0.02 K/UL
IMM GRANULOCYTES NFR BLD AUTO: 0.4 %
LYMPHOCYTES # BLD AUTO: 0.8 K/UL
LYMPHOCYTES NFR BLD: 15.1 %
MCH RBC QN AUTO: 32 PG
MCHC RBC AUTO-ENTMCNC: 31.8 G/DL
MCV RBC AUTO: 101 FL
MONOCYTES # BLD AUTO: 0.4 K/UL
MONOCYTES NFR BLD: 7.8 %
NEUTROPHILS # BLD AUTO: 4 K/UL
NEUTROPHILS NFR BLD: 75.4 %
NRBC BLD-RTO: 0 /100 WBC
PLATELET # BLD AUTO: 220 K/UL
PMV BLD AUTO: 9.9 FL
POTASSIUM SERPL-SCNC: 3.6 MMOL/L
PROT SERPL-MCNC: 7 G/DL
RBC # BLD AUTO: 4.31 M/UL
SODIUM SERPL-SCNC: 141 MMOL/L
WBC # BLD AUTO: 5.36 K/UL

## 2018-04-19 PROCEDURE — 85651 RBC SED RATE NONAUTOMATED: CPT

## 2018-04-19 PROCEDURE — 86160 COMPLEMENT ANTIGEN: CPT

## 2018-04-19 PROCEDURE — 86160 COMPLEMENT ANTIGEN: CPT | Mod: 59

## 2018-04-19 PROCEDURE — 86140 C-REACTIVE PROTEIN: CPT

## 2018-04-19 PROCEDURE — 36415 COLL VENOUS BLD VENIPUNCTURE: CPT | Mod: PO

## 2018-04-19 PROCEDURE — 86225 DNA ANTIBODY NATIVE: CPT

## 2018-04-19 PROCEDURE — 80053 COMPREHEN METABOLIC PANEL: CPT

## 2018-04-19 PROCEDURE — 85025 COMPLETE CBC W/AUTO DIFF WBC: CPT

## 2018-04-20 LAB — DSDNA AB SER-ACNC: NORMAL [IU]/ML

## 2018-04-22 ENCOUNTER — PATIENT MESSAGE (OUTPATIENT)
Dept: RHEUMATOLOGY | Facility: CLINIC | Age: 53
End: 2018-04-22

## 2018-04-23 ENCOUNTER — TELEPHONE (OUTPATIENT)
Dept: RHEUMATOLOGY | Facility: CLINIC | Age: 53
End: 2018-04-23

## 2018-04-23 DIAGNOSIS — E16.2 HYPOGLYCEMIA: Primary | ICD-10-CM

## 2018-04-24 ENCOUNTER — LAB VISIT (OUTPATIENT)
Dept: LAB | Facility: HOSPITAL | Age: 53
End: 2018-04-24
Attending: INTERNAL MEDICINE
Payer: COMMERCIAL

## 2018-04-24 DIAGNOSIS — E16.2 HYPOGLYCEMIA: ICD-10-CM

## 2018-04-24 LAB
ESTIMATED AVG GLUCOSE: 97 MG/DL
GLUCOSE SERPL-MCNC: 79 MG/DL
HBA1C MFR BLD HPLC: 5 %

## 2018-04-24 PROCEDURE — 36415 COLL VENOUS BLD VENIPUNCTURE: CPT | Mod: PO

## 2018-04-24 PROCEDURE — 83036 HEMOGLOBIN GLYCOSYLATED A1C: CPT

## 2018-04-24 PROCEDURE — 82947 ASSAY GLUCOSE BLOOD QUANT: CPT

## 2018-04-25 ENCOUNTER — OFFICE VISIT (OUTPATIENT)
Dept: RHEUMATOLOGY | Facility: CLINIC | Age: 53
End: 2018-04-25
Payer: COMMERCIAL

## 2018-04-25 VITALS
HEIGHT: 64 IN | DIASTOLIC BLOOD PRESSURE: 68 MMHG | HEART RATE: 64 BPM | BODY MASS INDEX: 22.67 KG/M2 | WEIGHT: 132.81 LBS | SYSTOLIC BLOOD PRESSURE: 95 MMHG

## 2018-04-25 DIAGNOSIS — M17.12 PRIMARY OSTEOARTHRITIS OF LEFT KNEE: ICD-10-CM

## 2018-04-25 DIAGNOSIS — M32.19 OTHER SYSTEMIC LUPUS ERYTHEMATOSUS WITH OTHER ORGAN INVOLVEMENT: ICD-10-CM

## 2018-04-25 DIAGNOSIS — E16.2 HYPOGLYCEMIA: ICD-10-CM

## 2018-04-25 PROCEDURE — 99214 OFFICE O/P EST MOD 30 MIN: CPT | Mod: S$GLB,,, | Performed by: INTERNAL MEDICINE

## 2018-04-25 PROCEDURE — 99999 PR PBB SHADOW E&M-EST. PATIENT-LVL IV: CPT | Mod: PBBFAC,,, | Performed by: INTERNAL MEDICINE

## 2018-04-25 RX ORDER — AZATHIOPRINE 50 MG/1
50 TABLET ORAL 2 TIMES DAILY
Qty: 180 TABLET | Refills: 0 | Status: SHIPPED | OUTPATIENT
Start: 2018-04-25 | End: 2018-08-15 | Stop reason: SDUPTHER

## 2018-04-25 RX ORDER — PREDNISONE 2.5 MG/1
2.5 TABLET ORAL DAILY
Qty: 90 TABLET | Refills: 1 | Status: SHIPPED | OUTPATIENT
Start: 2018-04-25 | End: 2019-01-24

## 2018-04-25 ASSESSMENT — ROUTINE ASSESSMENT OF PATIENT INDEX DATA (RAPID3)
AM STIFFNESS SCORE: 0, NO
PSYCHOLOGICAL DISTRESS SCORE: 0
PAIN SCORE: 0
PATIENT GLOBAL ASSESSMENT SCORE: 0
FATIGUE SCORE: 0
TOTAL RAPID3 SCORE: 0
MDHAQ FUNCTION SCORE: 0

## 2018-04-25 ASSESSMENT — SYSTEMIC LUPUS ERYTHEMATOSUS DISEASE ACTIVITY INDEX (SLEDAI): TOTAL_SCORE: 0

## 2018-04-25 NOTE — ASSESSMENT & PLAN NOTE
SLEDAI= 0    *Tdap  DXA  Azathioprine 100mg daily  Decrease prednisone 2.5mg daily  RTC 3 months with standing lupus labs and lipids

## 2018-04-25 NOTE — PROGRESS NOTES
"Subjective:       Patient ID: Madeleine Nunez is a 53 y.o. female.    Chief Complaint: SLE   HPI  Feels well except left knee pain x 1 month, onset after marathon training. Seeing orthopedist for suspected meniscal tear, has MRI scheduled. No PT yet. SLEDAI=0.  Review of Systems   Constitutional: Negative for appetite change, fatigue, fever and unexpected weight change.   HENT: Negative for mouth sores.    Eyes: Negative for visual disturbance.        Dry   Respiratory: Negative for cough, shortness of breath and wheezing.    Cardiovascular: Negative for chest pain and palpitations.   Gastrointestinal: Negative for abdominal pain, anal bleeding, blood in stool, constipation, diarrhea, nausea and vomiting.   Genitourinary: Negative for dysuria, frequency and urgency.   Musculoskeletal: Negative for arthralgias, back pain, gait problem, joint swelling, myalgias, neck pain and neck stiffness.   Skin: Negative for rash.   Neurological: Negative for weakness, numbness and headaches.   Hematological: Negative for adenopathy. Does not bruise/bleed easily.   Psychiatric/Behavioral: Negative for sleep disturbance. The patient is not nervous/anxious.          Objective:   BP 95/68   Pulse 64   Ht 5' 3.6" (1.615 m)   Wt 60.2 kg (132 lb 12.8 oz)   LMP 08/18/2013   BMI 23.08 kg/m²      Physical Exam   Constitutional: She is oriented to person, place, and time and well-developed, well-nourished, and in no distress.   HENT:   Head: Normocephalic and atraumatic.   Mouth/Throat: Oropharynx is clear and moist.   Eyes: Conjunctivae and EOM are normal.   Neck: Normal range of motion. Neck supple. No thyromegaly present.   Cardiovascular: Normal rate, regular rhythm, normal heart sounds and intact distal pulses.  Exam reveals no gallop and no friction rub.    No murmur heard.  Pulmonary/Chest: Breath sounds normal. She has no wheezes. She has no rales. She exhibits no tenderness.   Abdominal: Soft. She exhibits no distension and " no mass. There is no tenderness.       Right Side Rheumatological Exam     Examination finds the shoulder, elbow, wrist, knee, 1st PIP, 1st MCP, 2nd PIP, 2nd MCP, 3rd PIP, 3rd MCP, 4th PIP, 4th MCP, 5th PIP and 5th MCP normal.    Left Side Rheumatological Exam     Examination finds the shoulder, elbow, wrist, 1st PIP, 1st MCP, 2nd PIP, 2nd MCP, 3rd PIP, 3rd MCP, 4th PIP, 4th MCP, 5th PIP and 5th MCP normal.    The patient is tender to palpation of the knee.    Knee Exam   Tenderness Location: patella    Range of Motion   The patient has normal left knee ROM.    Patellofemoral Crepitus: positive  Effusion: negative  Warmth: negative      Lymphadenopathy:     She has no cervical adenopathy.   Neurological: She is alert and oriented to person, place, and time. She displays normal reflexes. Gait normal.   Nl motor strength UE and LE bilateral   Musculoskeletal: She exhibits no edema.      Arnold's neg no joint line tenderness + PF grind sign, reproduces symptoms.   Results for HUBERT RODRIGUEZ (MRN [id number]) as of 4/25/2018 11:20   Ref. Range 4/19/2018 09:20 4/19/2018 10:22 4/24/2018 08:45   WBC Latest Ref Range: 3.90 - 12.70 K/uL 5.36     RBC Latest Ref Range: 4.00 - 5.40 M/uL 4.31     Hemoglobin Latest Ref Range: 12.0 - 16.0 g/dL 13.8     Hematocrit Latest Ref Range: 37.0 - 48.5 % 43.4     MCV Latest Ref Range: 82 - 98 fL 101 (H)     MCH Latest Ref Range: 27.0 - 31.0 pg 32.0 (H)     MCHC Latest Ref Range: 32.0 - 36.0 g/dL 31.8 (L)     RDW Latest Ref Range: 11.5 - 14.5 % 13.5     Platelets Latest Ref Range: 150 - 350 K/uL 220     MPV Latest Ref Range: 9.2 - 12.9 fL 9.9     Gran% Latest Ref Range: 38.0 - 73.0 % 75.4 (H)     Gran # (ANC) Latest Ref Range: 1.8 - 7.7 K/uL 4.0     Immature Granulocytes Latest Ref Range: 0.0 - 0.5 % 0.4     Immature Grans (Abs) Latest Ref Range: 0.00 - 0.04 K/uL 0.02     Lymph% Latest Ref Range: 18.0 - 48.0 % 15.1 (L)     Lymph # Latest Ref Range: 1.0 - 4.8 K/uL 0.8 (L)     Mono% Latest  Ref Range: 4.0 - 15.0 % 7.8     Mono # Latest Ref Range: 0.3 - 1.0 K/uL 0.4     Eosinophil% Latest Ref Range: 0.0 - 8.0 % 0.7     Eos # Latest Ref Range: 0.0 - 0.5 K/uL 0.0     Basophil% Latest Ref Range: 0.0 - 1.9 % 0.6     Baso # Latest Ref Range: 0.00 - 0.20 K/uL 0.03     nRBC Latest Ref Range: 0 /100 WBC 0     Sed Rate Latest Ref Range: 0 - 20 mm/Hr 5     Sodium Latest Ref Range: 136 - 145 mmol/L 141     Potassium Latest Ref Range: 3.5 - 5.1 mmol/L 3.6     Chloride Latest Ref Range: 95 - 110 mmol/L 105     CO2 Latest Ref Range: 23 - 29 mmol/L 27     Anion Gap Latest Ref Range: 8 - 16 mmol/L 9     BUN, Bld Latest Ref Range: 6 - 20 mg/dL 23 (H)     Creatinine Latest Ref Range: 0.5 - 1.4 mg/dL 0.8     eGFR if non African American Latest Ref Range: >60 mL/min/1.73 m^2 >60.0     eGFR if African American Latest Ref Range: >60 mL/min/1.73 m^2 >60.0     Glucose Latest Ref Range: 70 - 110 mg/dL 59 (L)     Calcium Latest Ref Range: 8.7 - 10.5 mg/dL 9.5     Alkaline Phosphatase Latest Ref Range: 55 - 135 U/L 70     Total Protein Latest Ref Range: 6.0 - 8.4 g/dL 7.0     Albumin Latest Ref Range: 3.5 - 5.2 g/dL 3.7     Total Bilirubin Latest Ref Range: 0.1 - 1.0 mg/dL 0.7     AST Latest Ref Range: 10 - 40 U/L 15     ALT Latest Ref Range: 10 - 44 U/L 8 (L)     CRP Latest Ref Range: 0.0 - 8.2 mg/L 0.7     Glucose, Fasting Latest Ref Range: 70 - 110 mg/dL   79   Hemoglobin A1C Latest Ref Range: 4.0 - 5.6 %   5.0   Estimated Avg Glucose Latest Ref Range: 68 - 131 mg/dL   97   ds DNA Ab Latest Ref Range: Negative 1:10  Negative 1:10     Complement (C-3) Latest Ref Range: 50 - 180 mg/dL 101     Complement (C-4) Latest Ref Range: 11 - 44 mg/dL 11     Specimen UA Unknown  Urine, Clean Catch    Color, UA Latest Ref Range: Yellow, Straw, Yvonne   Yellow    pH, UA Latest Ref Range: 5.0 - 8.0   5.0    Specific Gravity, UA Latest Ref Range: 1.005 - 1.030   1.025    Appearance, UA Latest Ref Range: Clear   Hazy (A)    Protein, UA Latest  Ref Range: Negative   Negative    Glucose, UA Latest Ref Range: Negative   Negative    Ketones, UA Latest Ref Range: Negative   Negative    Occult Blood UA Latest Ref Range: Negative   Negative    Nitrite, UA Latest Ref Range: Negative   Negative    Urobilinogen, UA Latest Ref Range: <2.0 EU/dL  Negative      Assessment/Plan         Problem List Items Addressed This Visit     SLE (systemic lupus erythematosus)    Current Assessment & Plan     SLEDAI=      DXA  Azathioprine 100mg daily  RTC 3 months with standing lupus labs         Hypoglycemia    Overview     Results for HUBERT RODRIGUEZ (MRN [id number]) as of 4/25/2018 11:20   Ref. Range 10/19/2016 10:20 1/5/2017 13:40 7/12/2017 14:33 10/6/2017 14:22 11/28/2017 08:55 12/22/2017 12:18 1/9/2018 14:52 4/19/2018 09:20   Glucose Latest Ref Range: 70 - 110 mg/dL 98 103 96 73 80 74 43 (LL) 59 (L)   Results for JENNIFER HUBERT CROWELL (MRN [id number]) as of 4/25/2018 11:20   Ref. Range 1/24/2018 10:11 4/24/2018 08:45   Hemoglobin A1C Latest Ref Range: 4.0 - 5.6 % 5.2 5.0            Current Assessment & Plan     Endocrinology consult

## 2018-04-25 NOTE — PATIENT INSTRUCTIONS
Ask Dr. Mcfadden to fax me his endocrinology note regarding the hypoglycemia [phone]  Tdap today

## 2018-05-01 ENCOUNTER — PATIENT MESSAGE (OUTPATIENT)
Dept: RHEUMATOLOGY | Facility: CLINIC | Age: 53
End: 2018-05-01

## 2018-07-19 DIAGNOSIS — N95.1 HOT FLASH, MENOPAUSAL: ICD-10-CM

## 2018-07-19 DIAGNOSIS — N64.4 BILATERAL MASTODYNIA: ICD-10-CM

## 2018-07-19 RX ORDER — CONJUGATED ESTROGENS AND MEDROXYPROGESTERONE ACETATE .3; 1.5 MG/1; MG/1
1 TABLET, SUGAR COATED ORAL DAILY
Qty: 28 TABLET | Refills: 3
Start: 2018-07-19 | End: 2019-07-19

## 2018-08-01 DIAGNOSIS — N64.4 BILATERAL MASTODYNIA: ICD-10-CM

## 2018-08-01 DIAGNOSIS — N95.1 HOT FLASH, MENOPAUSAL: ICD-10-CM

## 2018-08-01 NOTE — TELEPHONE ENCOUNTER
Spoke with pt. Former pt of Dr. Rosen. Last visit 2/23/17. Pt would like to see Dr. Smith. Scheduled for 9/13/18 at the [facility] location. Pt verbalized understanding. Pt requests refill on [facility]. Updated pharmacy. Orders pended. Please advise.

## 2018-08-02 RX ORDER — AZELASTINE 1 MG/ML
2 SPRAY, METERED NASAL 2 TIMES DAILY PRN
Qty: 30 ML | Refills: 5 | Status: SHIPPED | OUTPATIENT
Start: 2018-08-02 | End: 2019-01-24

## 2018-08-15 DIAGNOSIS — M32.19 OTHER SYSTEMIC LUPUS ERYTHEMATOSUS WITH OTHER ORGAN INVOLVEMENT: ICD-10-CM

## 2018-08-15 RX ORDER — AZATHIOPRINE 50 MG/1
TABLET ORAL
Qty: 60 TABLET | Refills: 0 | Status: SHIPPED | OUTPATIENT
Start: 2018-08-15 | End: 2018-08-30

## 2018-08-27 ENCOUNTER — LAB VISIT (OUTPATIENT)
Dept: LAB | Facility: HOSPITAL | Age: 53
End: 2018-08-27
Attending: INTERNAL MEDICINE
Payer: COMMERCIAL

## 2018-08-27 DIAGNOSIS — M32.9 SLE (SYSTEMIC LUPUS ERYTHEMATOSUS): ICD-10-CM

## 2018-08-27 LAB
ALBUMIN SERPL BCP-MCNC: 3.7 G/DL
ALP SERPL-CCNC: 76 U/L
ALT SERPL W/O P-5'-P-CCNC: 9 U/L
ANION GAP SERPL CALC-SCNC: 9 MMOL/L
AST SERPL-CCNC: 15 U/L
BASOPHILS # BLD AUTO: 0.04 K/UL
BASOPHILS NFR BLD: 0.9 %
BILIRUB SERPL-MCNC: 0.6 MG/DL
BUN SERPL-MCNC: 17 MG/DL
C3 SERPL-MCNC: 90 MG/DL
C4 SERPL-MCNC: 10 MG/DL
CALCIUM SERPL-MCNC: 9.5 MG/DL
CHLORIDE SERPL-SCNC: 104 MMOL/L
CO2 SERPL-SCNC: 27 MMOL/L
CREAT SERPL-MCNC: 0.8 MG/DL
CRP SERPL-MCNC: 0.3 MG/L
DIFFERENTIAL METHOD: ABNORMAL
EOSINOPHIL # BLD AUTO: 0 K/UL
EOSINOPHIL NFR BLD: 0.9 %
ERYTHROCYTE [DISTWIDTH] IN BLOOD BY AUTOMATED COUNT: 12.7 %
EST. GFR  (AFRICAN AMERICAN): >60 ML/MIN/1.73 M^2
EST. GFR  (NON AFRICAN AMERICAN): >60 ML/MIN/1.73 M^2
GLUCOSE SERPL-MCNC: 74 MG/DL
HCT VFR BLD AUTO: 42 %
HGB BLD-MCNC: 13.5 G/DL
IMM GRANULOCYTES # BLD AUTO: 0.01 K/UL
IMM GRANULOCYTES NFR BLD AUTO: 0.2 %
LYMPHOCYTES # BLD AUTO: 0.6 K/UL
LYMPHOCYTES NFR BLD: 13.5 %
MCH RBC QN AUTO: 31.7 PG
MCHC RBC AUTO-ENTMCNC: 32.1 G/DL
MCV RBC AUTO: 99 FL
MONOCYTES # BLD AUTO: 0.5 K/UL
MONOCYTES NFR BLD: 11.1 %
NEUTROPHILS # BLD AUTO: 3.1 K/UL
NEUTROPHILS NFR BLD: 73.4 %
NRBC BLD-RTO: 0 /100 WBC
PLATELET # BLD AUTO: 221 K/UL
PMV BLD AUTO: 9.9 FL
POTASSIUM SERPL-SCNC: 4.2 MMOL/L
PROT SERPL-MCNC: 7 G/DL
RBC # BLD AUTO: 4.26 M/UL
SODIUM SERPL-SCNC: 140 MMOL/L
WBC # BLD AUTO: 4.23 K/UL

## 2018-08-27 PROCEDURE — 36415 COLL VENOUS BLD VENIPUNCTURE: CPT | Mod: PO

## 2018-08-27 PROCEDURE — 80053 COMPREHEN METABOLIC PANEL: CPT

## 2018-08-27 PROCEDURE — 85025 COMPLETE CBC W/AUTO DIFF WBC: CPT

## 2018-08-27 PROCEDURE — 86160 COMPLEMENT ANTIGEN: CPT | Mod: 59

## 2018-08-27 PROCEDURE — 86160 COMPLEMENT ANTIGEN: CPT

## 2018-08-27 PROCEDURE — 86225 DNA ANTIBODY NATIVE: CPT

## 2018-08-27 PROCEDURE — 85652 RBC SED RATE AUTOMATED: CPT

## 2018-08-27 PROCEDURE — 86140 C-REACTIVE PROTEIN: CPT

## 2018-08-28 LAB
DSDNA AB SER-ACNC: NORMAL [IU]/ML
ERYTHROCYTE [SEDIMENTATION RATE] IN BLOOD BY WESTERGREN METHOD: 10 MM/HR

## 2018-08-30 ENCOUNTER — OFFICE VISIT (OUTPATIENT)
Dept: RHEUMATOLOGY | Facility: CLINIC | Age: 53
End: 2018-08-30
Payer: COMMERCIAL

## 2018-08-30 VITALS
DIASTOLIC BLOOD PRESSURE: 70 MMHG | BODY MASS INDEX: 22.76 KG/M2 | WEIGHT: 133.31 LBS | HEART RATE: 73 BPM | HEIGHT: 64 IN | SYSTOLIC BLOOD PRESSURE: 99 MMHG

## 2018-08-30 DIAGNOSIS — M32.19 OTHER SYSTEMIC LUPUS ERYTHEMATOSUS WITH OTHER ORGAN INVOLVEMENT: ICD-10-CM

## 2018-08-30 DIAGNOSIS — E78.5 DYSLIPIDEMIA: Primary | ICD-10-CM

## 2018-08-30 PROCEDURE — 99214 OFFICE O/P EST MOD 30 MIN: CPT | Mod: S$GLB,,, | Performed by: INTERNAL MEDICINE

## 2018-08-30 PROCEDURE — 99999 PR PBB SHADOW E&M-EST. PATIENT-LVL III: CPT | Mod: PBBFAC,,, | Performed by: INTERNAL MEDICINE

## 2018-08-30 PROCEDURE — 3008F BODY MASS INDEX DOCD: CPT | Mod: CPTII,S$GLB,, | Performed by: INTERNAL MEDICINE

## 2018-08-30 RX ORDER — AZATHIOPRINE 50 MG/1
TABLET ORAL
Qty: 270 TABLET | Refills: 0 | Status: SHIPPED | OUTPATIENT
Start: 2018-08-30 | End: 2018-09-18

## 2018-08-30 ASSESSMENT — ROUTINE ASSESSMENT OF PATIENT INDEX DATA (RAPID3)
FATIGUE SCORE: 0
PSYCHOLOGICAL DISTRESS SCORE: 1.1
MDHAQ FUNCTION SCORE: 1.1
PAIN SCORE: 0
AM STIFFNESS SCORE: 1, YES
WHEN YOU AWAKENED IN THE MORNING OVER THE LAST WEEK, PLEASE INDICATE THE AMOUNT OF TIME IT TAKES UNTIL YOU ARE AS LIMBER AS YOU WILL BE FOR THE DAY: 15 MINS
TOTAL RAPID3 SCORE: 1.89
PATIENT GLOBAL ASSESSMENT SCORE: 2

## 2018-08-30 ASSESSMENT — SYSTEMIC LUPUS ERYTHEMATOSUS DISEASE ACTIVITY INDEX (SLEDAI): TOTAL_SCORE: 2

## 2018-08-30 NOTE — PROGRESS NOTES
I have personally taken the history and examined the patient and agree with the resident,s note as stated above     Mild crepitus right knee, mild crepitus left knee with limited flexion 110 degrees no effusion, warmth, erythema    SLE SLEDAI=2  Left knee OA    increase azathioprine 125mg daily  Cont prednisone 2.5mg daily, if labs OK in one month, will stop   Cbc, cmp lipid panel  in 4 wks  DXA as previously ordered  Cont glucosamine-chondroitin [id number], turmeric 500-1000 mg  Avoid squats, kneels, stairs  Limited weight bearing exercise.  Tdap  RTC 3 months with lupus labs, DXA

## 2018-08-30 NOTE — PROGRESS NOTES
"Subjective:       Patient ID: Madeleine Nunez is a 53 y.o. female.    Chief Complaint: SLE   HPI  Feels well except left knee pain.  onset after marathon training. Seeing orthopedist for suspected meniscal tear, has MRI scheduled. No PT yet. SLEDAI=0.  Azathioprine 50 mg BID, prednisone 2.5 mg daily   Blood sugars were fine      Review of Systems   Constitutional: Negative for appetite change, fatigue, fever and unexpected weight change.   HENT: Negative for mouth sores.    Eyes: Negative for visual disturbance.        Dry   Respiratory: Negative for cough, shortness of breath and wheezing.    Cardiovascular: Negative for chest pain and palpitations.   Gastrointestinal: Negative for abdominal pain, anal bleeding, blood in stool, constipation, diarrhea, nausea and vomiting.   Genitourinary: Negative for dysuria, frequency and urgency.   Musculoskeletal: Negative for arthralgias, back pain, gait problem, joint swelling, myalgias, neck pain and neck stiffness.   Skin: Negative for rash.   Neurological: Negative for weakness, numbness and headaches.   Hematological: Negative for adenopathy. Does not bruise/bleed easily.   Psychiatric/Behavioral: Negative for sleep disturbance. The patient is not nervous/anxious.          Objective:   BP 99/70   Pulse 73   Ht 5' 3.6" (1.615 m)   Wt 60.5 kg (133 lb 4.8 oz)   LMP 08/18/2013   BMI 23.17 kg/m²      Physical Exam   Constitutional: She is oriented to person, place, and time and well-developed, well-nourished, and in no distress.   HENT:   Head: Normocephalic and atraumatic.   Mouth/Throat: Oropharynx is clear and moist.   Eyes: Conjunctivae and EOM are normal.   Neck: Normal range of motion. Neck supple. No thyromegaly present.   Cardiovascular: Normal rate, regular rhythm, normal heart sounds and intact distal pulses.  Exam reveals no gallop and no friction rub.    No murmur heard.  Pulmonary/Chest: Breath sounds normal. She has no wheezes. She has no rales. She " exhibits no tenderness.   Abdominal: Soft. She exhibits no distension and no mass. There is no tenderness.       Right Side Rheumatological Exam     Examination finds the shoulder, elbow, wrist, knee, 1st PIP, 1st MCP, 2nd PIP, 2nd MCP, 3rd PIP, 3rd MCP, 4th PIP, 4th MCP, 5th PIP and 5th MCP normal.    Left Side Rheumatological Exam     Examination finds the shoulder, elbow, wrist, knee, 1st PIP, 1st MCP, 2nd PIP, 2nd MCP, 3rd PIP, 3rd MCP, 4th PIP, 4th MCP, 5th PIP and 5th MCP normal.    Knee Exam   Tenderness Location: patella    Range of Motion   The patient has normal left knee ROM.    Patellofemoral Crepitus: positive  Effusion: negative  Warmth: negative      Lymphadenopathy:     She has no cervical adenopathy.   Neurological: She is alert and oriented to person, place, and time. She displays normal reflexes. Gait normal.   Nl motor strength UE and LE bilateral   Musculoskeletal: She exhibits no edema.      Arnold's neg no joint line tenderness + PF grind sign, reproduces symptoms.    Results for HUBERT RODRIGUEZ (MRN [id number]) as of 8/30/2018 11:38   Ref. Range 4/24/2018 08:45 8/27/2018 09:00   WBC Latest Ref Range: 3.90 - 12.70 K/uL  4.23   RBC Latest Ref Range: 4.00 - 5.40 M/uL  4.26   Hemoglobin Latest Ref Range: 12.0 - 16.0 g/dL  13.5   Hematocrit Latest Ref Range: 37.0 - 48.5 %  42.0   MCV Latest Ref Range: 82 - 98 fL  99 (H)   MCH Latest Ref Range: 27.0 - 31.0 pg  31.7 (H)   MCHC Latest Ref Range: 32.0 - 36.0 g/dL  32.1   RDW Latest Ref Range: 11.5 - 14.5 %  12.7   Platelets Latest Ref Range: 150 - 350 K/uL  221   MPV Latest Ref Range: 9.2 - 12.9 fL  9.9   Gran% Latest Ref Range: 38.0 - 73.0 %  73.4 (H)   Gran # (ANC) Latest Ref Range: 1.8 - 7.7 K/uL  3.1   Immature Granulocytes Latest Ref Range: 0.0 - 0.5 %  0.2   Immature Grans (Abs) Latest Ref Range: 0.00 - 0.04 K/uL  0.01   Lymph% Latest Ref Range: 18.0 - 48.0 %  13.5 (L)   Lymph # Latest Ref Range: 1.0 - 4.8 K/uL  0.6 (L)   Mono% Latest Ref  Range: 4.0 - 15.0 %  11.1   Mono # Latest Ref Range: 0.3 - 1.0 K/uL  0.5   Eosinophil% Latest Ref Range: 0.0 - 8.0 %  0.9   Eos # Latest Ref Range: 0.0 - 0.5 K/uL  0.0   Basophil% Latest Ref Range: 0.0 - 1.9 %  0.9   Baso # Latest Ref Range: 0.00 - 0.20 K/uL  0.04   nRBC Latest Ref Range: 0 /100 WBC  0   Sed Rate Latest Ref Range: 0 - 36 mm/Hr  10   Sodium Latest Ref Range: 136 - 145 mmol/L  140   Potassium Latest Ref Range: 3.5 - 5.1 mmol/L  4.2   Chloride Latest Ref Range: 95 - 110 mmol/L  104   CO2 Latest Ref Range: 23 - 29 mmol/L  27   Anion Gap Latest Ref Range: 8 - 16 mmol/L  9   BUN, Bld Latest Ref Range: 6 - 20 mg/dL  17   Creatinine Latest Ref Range: 0.5 - 1.4 mg/dL  0.8   eGFR if non African American Latest Ref Range: >60 mL/min/1.73 m^2  >60.0   eGFR if African American Latest Ref Range: >60 mL/min/1.73 m^2  >60.0   Glucose Latest Ref Range: 70 - 110 mg/dL  74   Calcium Latest Ref Range: 8.7 - 10.5 mg/dL  9.5   Alkaline Phosphatase Latest Ref Range: 55 - 135 U/L  76   Total Protein Latest Ref Range: 6.0 - 8.4 g/dL  7.0   Albumin Latest Ref Range: 3.5 - 5.2 g/dL  3.7   Total Bilirubin Latest Ref Range: 0.1 - 1.0 mg/dL  0.6   AST Latest Ref Range: 10 - 40 U/L  15   ALT Latest Ref Range: 10 - 44 U/L  9 (L)   CRP Latest Ref Range: 0.0 - 8.2 mg/L  0.3   Glucose, Fasting Latest Ref Range: 70 - 110 mg/dL 79    Hemoglobin A1C Latest Ref Range: 4.0 - 5.6 % 5.0    Estimated Avg Glucose Latest Ref Range: 68 - 131 mg/dL 97    ds DNA Ab Latest Ref Range: Negative 1:10   Negative 1:10   Complement (C-3) Latest Ref Range: 50 - 180 mg/dL  90   Complement (C-4) Latest Ref Range: 11 - 44 mg/dL  10 (L)   Differential Method Unknown  Automated     Assessment/Plan   1. SLE- Increase azathioprine to 125 mg per day. Continue deltasone 2.5 mg daily  2. Need to get DEXA bone density scan done  3. Tdap vaccine ordered  4. Avoid squats and stairs as form of exercise, stick to low impact, low weight bearing exercises that  incorporate the knees such as cycling.  5. CBC, CMP, lipids ordered to be completed in 1 month. If complement improved we can consider stopping prednisone  6. RTC in 3 months with standing labs

## 2018-08-30 NOTE — PATIENT INSTRUCTIONS
Take 2.5 tablets of azathioprine per day now. Continue the prednisone 2.5 mg for now. Get lipid labs and blood labs done in a month.    1500 mg glucosamine and 1200 mg of tumeric per day will be fine

## 2018-09-13 ENCOUNTER — OFFICE VISIT (OUTPATIENT)
Dept: OBSTETRICS AND GYNECOLOGY | Facility: CLINIC | Age: 53
End: 2018-09-13
Payer: COMMERCIAL

## 2018-09-13 VITALS
BODY MASS INDEX: 23.68 KG/M2 | DIASTOLIC BLOOD PRESSURE: 72 MMHG | HEIGHT: 63 IN | SYSTOLIC BLOOD PRESSURE: 116 MMHG | WEIGHT: 133.63 LBS

## 2018-09-13 DIAGNOSIS — N84.1 CERVICAL POLYP: ICD-10-CM

## 2018-09-13 DIAGNOSIS — Z01.419 WELL WOMAN EXAM WITH ROUTINE GYNECOLOGICAL EXAM: Primary | ICD-10-CM

## 2018-09-13 PROCEDURE — 99396 PREV VISIT EST AGE 40-64: CPT | Mod: 25,S$GLB,, | Performed by: OBSTETRICS & GYNECOLOGY

## 2018-09-13 PROCEDURE — 88141 CYTOPATH C/V INTERPRET: CPT | Mod: ,,, | Performed by: PATHOLOGY

## 2018-09-13 PROCEDURE — 88305 TISSUE EXAM BY PATHOLOGIST: CPT | Performed by: PATHOLOGY

## 2018-09-13 PROCEDURE — 57500 BIOPSY OF CERVIX: CPT | Mod: S$GLB,,, | Performed by: OBSTETRICS & GYNECOLOGY

## 2018-09-13 PROCEDURE — 99999 PR PBB SHADOW E&M-EST. PATIENT-LVL III: CPT | Mod: PBBFAC,,, | Performed by: OBSTETRICS & GYNECOLOGY

## 2018-09-13 PROCEDURE — 88305 TISSUE EXAM BY PATHOLOGIST: CPT | Mod: 26,,, | Performed by: PATHOLOGY

## 2018-09-13 PROCEDURE — 88175 CYTOPATH C/V AUTO FLUID REDO: CPT | Performed by: PATHOLOGY

## 2018-09-14 NOTE — PROGRESS NOTES
CHIEF COMPLAINT:   Gynecologic Exam  Chief Complaint   Patient presents with    Well Woman       HISTORY OF PRESENT ILLNESS  Patient presents for annual exam. The patient has no complaints today. occ postcoital spotting.    Patient's last menstrual period was 2013.  Postmenopausal     GYN screening history: last Pap: was normal. Never had any abnormal Pap smears in past. However she had a colpo w biopsy in 2016 for postcoital bleedin  SPECIMEN  1) Cervix, 6 o'clock.  2) Cervix, 7 o'clock.  3) Endocervical curettage.  FINAL PATHOLOGIC DIAGNOSIS  1. CERVIX, 6 O'CLOCK, BIOPSY:  Minute fragment of granulation tissue  No evidence of malignancy  2. CERVIX, 7 O'CLOCK, BIOPSY:  Granulation tissue  No evidence of malignancy  3. ENDOCERVICAL CURRETAGE:  Mucus only  No epithelium present for evaluation    Reports no bowel movement irregularities from baseline, bloating, or weight loss.   HRT:   None      Health Maintenance   Topic Date Due    TETANUS VACCINE  1984    Colonoscopy  2016    Influenza Vaccine  2018    Mammogram  2018    Pap Smear with HPV Cotest  2019    Lipid Panel  2022       HISTORY  Patient Active Problem List   Diagnosis    SLE (systemic lupus erythematosus)    Kidney stones    Sicca syndrome    Rosacea    Meniere's disease of right ear    Hypothyroidism    History of long-term treatment with high-risk medication    Leblanc's neuroma of right foot    Hypoglycemia    Chronic fatigue    Plantar fasciitis of right foot    Pyuria, sterile    PCB (post coital bleeding)    Bilateral mastodynia    Granulation tissue of cervix    Shortness of breath    Left lumbar radiculopathy    Osteoarthritis of left knee       Past Medical History:   Diagnosis Date    Allergy     Anemia     Blood transfusion     Dry eyes     Hypothyroidism 10/9/2012    Joint pain     Kidney stones 10/9/2012    Lupus     Leblanc's neuroma of right foot 2014     Osteoarthritis of left knee 4/25/2018    Substance abuse     Systemic lupus erythematosus        History reviewed. No pertinent surgical history.    Family History   Problem Relation Age of Onset    Lupus Sister     Thyroid cancer Sister     Glaucoma Sister     Melanoma Father     Thyroid cancer Father     Diabetes Father     Cataracts Father     Diabetes Mother     Glaucoma Mother     Cataracts Mother     Hypertension Brother     Kidney failure Sister     Lupus Sister     Glaucoma Sister     Breast cancer Paternal Grandmother     Kidney disease Neg Hx     Psoriasis Neg Hx     Eczema Neg Hx     Acne Neg Hx     Colon cancer Neg Hx     Ovarian cancer Neg Hx     Thrombophilia Neg Hx     Thrombosis Neg Hx        Social History     Socioeconomic History    Marital status:      Spouse name: None    Number of children: None    Years of education: None    Highest education level: None   Social Needs    Financial resource strain: None    Food insecurity - worry: None    Food insecurity - inability: None    Transportation needs - medical: None    Transportation needs - non-medical: None   Occupational History     Employer: [employer]   Tobacco Use    Smoking status: Never Smoker    Smokeless tobacco: Never Used   Substance and Sexual Activity    Alcohol use: Yes     Comment: 1 drink per week    Drug use: No    Sexual activity: Yes     Partners: Male     Birth control/protection: Post-menopausal     Comment: mut monog   Other Topics Concern    Are you pregnant or think you may be? No    Breast-feeding No   Social History Narrative    None       Current Outpatient Medications   Medication Sig Dispense Refill    azaTHIOprine (IMURAN) 50 mg Tab TAKE 2.5 TABLETS BY MOUTH ONCE PER  tablet 0    azelastine (ASTELIN) 137 mcg (0.1 %) nasal spray 2 sprays (274 mcg total) by Nasal route 2 (two) times daily as needed. 30 mL 5    estrogen, conjugated,-medroxyprogesterone  0.3-1.5 mg (PREMPRO) 0.3-1.5 mg per tablet Take 1 tablet by mouth once daily. 28 tablet 1    estrogen, conjugated,-medroxyprogesterone 0.3-1.5 mg (PREMPRO) 0.3-1.5 mg per tablet Take 1 tablet by mouth.      fluocinolone acetonide oil (DERMOTIC OIL) 0.01 % Drop Place 5 drops in ear(s) daily as needed.       fluticasone (FLONASE) 50 mcg/actuation nasal spray 2 sprays by Each Nare route once daily.      ipratropium (ATROVENT) 0.06 % nasal spray 2 sprays by Nasal route 3 (three) times daily.      levothyroxine (SYNTHROID) 75 MCG tablet Take 75 mcg by mouth.      predniSONE (DELTASONE) 2.5 MG tablet Take 1 tablet (2.5 mg total) by mouth once daily. 90 tablet 1    SPIRONOLACTONE ORAL Take by mouth.      metronidazole 0.75% (METROCREAM) 0.75 % Crea APPLY TOPICALLY TO FACE TWICE DAILY  2     No current facility-administered medications for this visit.        Review of patient's allergies indicates:   Allergen Reactions    Sulfa (sulfonamide antibiotics)      Other reaction(s): Flushing (skin)    Sulfamethoxazole-trimethoprim      Other reaction(s): Flushing (skin)           PHYSICAL EXAM     Vitals:    09/13/18 1118   BP: 116/72       PAIN SCALE: 0/10 None    ROS:  GENERAL: No fever, chills, fatigability or weight loss.  ABDOMEN: Appetite fine. No weight loss. Denies diarrhea, abdominal pain, hematemesis or blood in stool.  No change in bowel movement pattern.  URINARY: No flank pain, dysuria or hematuria.  BREASTS: Breasts symmetric, nontender and no lumps detected.    PE:   APPEARANCE: Well nourished, well developed, in no acute distress.  NECK: Neck symmetric without masses or thyromegaly.  NODES: No inguinal lymph node enlargement.  ABDOMEN: Soft. No tenderness or masses. No hepatosplenomegaly. No hernias.  BREASTS: Symmetrical, no skin changes or visible lesions. No palpable masses, nipple discharge or adenopathy bilaterally.    PELVIC:   VULVA: No lesions. Normal female genitalia.  URETHRAL MEATUS: Normal  size and location, no lesions, no prolapse.  URETHRA: No masses, tenderness, prolapse or scarring.  VAGINA: Moist and well rugated, no discharge, no significant cystocele or rectocele.  CERVIX: No lesions, normal diameter, no stenosis, no cervical motion tenderness.  UTERUS: 8 week size, regular shape, mobile, non-tender, normal position, good support.  ADNEXA: No masses, tenderness or CDS nodularity.  ANUS PERINEUM: No lesions, no relaxation, external hemorrhoids noted.        DIAGNOSIS:   1.  gyn exam  2. Screening pap smear  3. Cervical polyp  4. Hx polyp  5. Postcoital bleeding      PLAN:   Mammogram    Colonoscopy       MEDICATIONS PRESCRIBED:   Calcium vitamin D and weight bearing exercise    COUNSELING:  Patient was counseled today on A.C.S. Pap guidelines and recommendations for yearly pelvic exams, mammograms and monthly self breast exams; to see her PCP for other health maintenance.     FOLLOW-UP: With me in 1 year. Pap smear every 3 years.

## 2018-09-17 DIAGNOSIS — M32.19 OTHER SYSTEMIC LUPUS ERYTHEMATOSUS WITH OTHER ORGAN INVOLVEMENT: ICD-10-CM

## 2018-09-18 DIAGNOSIS — M32.19 OTHER SYSTEMIC LUPUS ERYTHEMATOSUS WITH OTHER ORGAN INVOLVEMENT: ICD-10-CM

## 2018-09-18 RX ORDER — AZATHIOPRINE 50 MG/1
TABLET ORAL
Qty: 270 TABLET | Refills: 0 | Status: SHIPPED | OUTPATIENT
Start: 2018-09-18 | End: 2018-09-18

## 2018-09-19 RX ORDER — AZATHIOPRINE 50 MG/1
TABLET ORAL
Qty: 270 TABLET | Refills: 0 | Status: SHIPPED | OUTPATIENT
Start: 2018-09-19 | End: 2018-12-11

## 2018-09-20 ENCOUNTER — PATIENT MESSAGE (OUTPATIENT)
Dept: RHEUMATOLOGY | Facility: CLINIC | Age: 53
End: 2018-09-20

## 2018-10-22 DIAGNOSIS — N64.4 BILATERAL MASTODYNIA: ICD-10-CM

## 2018-10-22 DIAGNOSIS — N95.1 HOT FLASH, MENOPAUSAL: ICD-10-CM

## 2018-10-23 RX ORDER — CONJUGATED ESTROGENS AND MEDROXYPROGESTERONE ACETATE .3; 1.5 MG/1; MG/1
1 TABLET, SUGAR COATED ORAL DAILY
Qty: 28 TABLET | Refills: 3 | Status: SHIPPED | OUTPATIENT
Start: 2018-10-23 | End: 2018-12-13

## 2018-11-12 ENCOUNTER — TELEPHONE (OUTPATIENT)
Dept: RHEUMATOLOGY | Facility: CLINIC | Age: 53
End: 2018-11-12

## 2018-11-12 ENCOUNTER — PATIENT MESSAGE (OUTPATIENT)
Dept: RHEUMATOLOGY | Facility: CLINIC | Age: 53
End: 2018-11-12

## 2018-11-12 ENCOUNTER — LAB VISIT (OUTPATIENT)
Dept: LAB | Facility: HOSPITAL | Age: 53
End: 2018-11-12
Attending: INTERNAL MEDICINE
Payer: COMMERCIAL

## 2018-11-12 DIAGNOSIS — E78.5 DYSLIPIDEMIA: ICD-10-CM

## 2018-11-12 DIAGNOSIS — M32.9 SLE (SYSTEMIC LUPUS ERYTHEMATOSUS): ICD-10-CM

## 2018-11-12 DIAGNOSIS — M32.19 OTHER SYSTEMIC LUPUS ERYTHEMATOSUS WITH OTHER ORGAN INVOLVEMENT: ICD-10-CM

## 2018-11-12 DIAGNOSIS — Z79.624 ENCOUNTER FOR LONG TERM CURRENT USE OF AZATHIOPRINE: ICD-10-CM

## 2018-11-12 LAB
ALBUMIN SERPL BCP-MCNC: 3.5 G/DL
ALP SERPL-CCNC: 62 U/L
ALT SERPL W/O P-5'-P-CCNC: 7 U/L
ANION GAP SERPL CALC-SCNC: 8 MMOL/L
AST SERPL-CCNC: 19 U/L
BASOPHILS # BLD AUTO: 0.02 K/UL
BASOPHILS NFR BLD: 0.7 %
BILIRUB SERPL-MCNC: 0.5 MG/DL
BUN SERPL-MCNC: 16 MG/DL
CALCIUM SERPL-MCNC: 9 MG/DL
CHLORIDE SERPL-SCNC: 105 MMOL/L
CHOLEST SERPL-MCNC: 178 MG/DL
CHOLEST/HDLC SERPL: 2.7 {RATIO}
CO2 SERPL-SCNC: 28 MMOL/L
CREAT SERPL-MCNC: 0.7 MG/DL
CRP SERPL-MCNC: 0.2 MG/L
DIFFERENTIAL METHOD: ABNORMAL
EOSINOPHIL # BLD AUTO: 0 K/UL
EOSINOPHIL NFR BLD: 1.4 %
ERYTHROCYTE [DISTWIDTH] IN BLOOD BY AUTOMATED COUNT: 12.5 %
ERYTHROCYTE [SEDIMENTATION RATE] IN BLOOD BY WESTERGREN METHOD: 3 MM/HR
EST. GFR  (AFRICAN AMERICAN): >60 ML/MIN/1.73 M^2
EST. GFR  (NON AFRICAN AMERICAN): >60 ML/MIN/1.73 M^2
GLUCOSE SERPL-MCNC: 72 MG/DL
HCT VFR BLD AUTO: 39.4 %
HDLC SERPL-MCNC: 65 MG/DL
HDLC SERPL: 36.5 %
HGB BLD-MCNC: 13.2 G/DL
IMM GRANULOCYTES # BLD AUTO: 0.01 K/UL
IMM GRANULOCYTES NFR BLD AUTO: 0.4 %
LDLC SERPL CALC-MCNC: 85.8 MG/DL
LYMPHOCYTES # BLD AUTO: 0.5 K/UL
LYMPHOCYTES NFR BLD: 17 %
MCH RBC QN AUTO: 32.8 PG
MCHC RBC AUTO-ENTMCNC: 33.5 G/DL
MCV RBC AUTO: 98 FL
MONOCYTES # BLD AUTO: 0.3 K/UL
MONOCYTES NFR BLD: 11 %
NEUTROPHILS # BLD AUTO: 2 K/UL
NEUTROPHILS NFR BLD: 69.5 %
NONHDLC SERPL-MCNC: 113 MG/DL
NRBC BLD-RTO: 0 /100 WBC
PLATELET # BLD AUTO: 213 K/UL
PMV BLD AUTO: 10.4 FL
POTASSIUM SERPL-SCNC: 3.9 MMOL/L
PROT SERPL-MCNC: 6.9 G/DL
RBC # BLD AUTO: 4.03 M/UL
SODIUM SERPL-SCNC: 141 MMOL/L
TRIGL SERPL-MCNC: 136 MG/DL
WBC # BLD AUTO: 2.83 K/UL

## 2018-11-12 PROCEDURE — 36415 COLL VENOUS BLD VENIPUNCTURE: CPT | Mod: PO

## 2018-11-12 PROCEDURE — 80061 LIPID PANEL: CPT

## 2018-11-12 PROCEDURE — 85652 RBC SED RATE AUTOMATED: CPT

## 2018-11-12 PROCEDURE — 85025 COMPLETE CBC W/AUTO DIFF WBC: CPT

## 2018-11-12 PROCEDURE — 86140 C-REACTIVE PROTEIN: CPT

## 2018-11-12 PROCEDURE — 80053 COMPREHEN METABOLIC PANEL: CPT

## 2018-11-12 NOTE — TELEPHONE ENCOUNTER
LM for pt to call back re scheduling labs/dexa. ----- Message from Jose Armando Mercado sent at 11/12/2018 10:05 AM CST -----  Contact: Self @ [phone]  Pt would like a call back @ [phone] in regards to the missed call she received

## 2018-11-13 ENCOUNTER — TELEPHONE (OUTPATIENT)
Dept: RHEUMATOLOGY | Facility: CLINIC | Age: 53
End: 2018-11-13

## 2018-11-20 ENCOUNTER — LAB VISIT (OUTPATIENT)
Dept: LAB | Facility: HOSPITAL | Age: 53
End: 2018-11-20
Attending: INTERNAL MEDICINE
Payer: COMMERCIAL

## 2018-11-20 DIAGNOSIS — Z79.624 ENCOUNTER FOR LONG TERM CURRENT USE OF AZATHIOPRINE: ICD-10-CM

## 2018-11-20 LAB
BASOPHILS # BLD AUTO: 0.04 K/UL
BASOPHILS NFR BLD: 0.8 %
DIFFERENTIAL METHOD: ABNORMAL
EOSINOPHIL # BLD AUTO: 0 K/UL
EOSINOPHIL NFR BLD: 0.8 %
ERYTHROCYTE [DISTWIDTH] IN BLOOD BY AUTOMATED COUNT: 12.8 %
HCT VFR BLD AUTO: 40.1 %
HGB BLD-MCNC: 13.7 G/DL
IMM GRANULOCYTES # BLD AUTO: 0.01 K/UL
IMM GRANULOCYTES NFR BLD AUTO: 0.2 %
LYMPHOCYTES # BLD AUTO: 0.8 K/UL
LYMPHOCYTES NFR BLD: 15.7 %
MCH RBC QN AUTO: 32.6 PG
MCHC RBC AUTO-ENTMCNC: 34.2 G/DL
MCV RBC AUTO: 96 FL
MONOCYTES # BLD AUTO: 0.5 K/UL
MONOCYTES NFR BLD: 8.9 %
NEUTROPHILS # BLD AUTO: 3.8 K/UL
NEUTROPHILS NFR BLD: 73.6 %
NRBC BLD-RTO: 0 /100 WBC
PLATELET # BLD AUTO: 225 K/UL
PMV BLD AUTO: 10.3 FL
RBC # BLD AUTO: 4.2 M/UL
WBC # BLD AUTO: 5.15 K/UL

## 2018-11-20 PROCEDURE — 85025 COMPLETE CBC W/AUTO DIFF WBC: CPT

## 2018-11-20 PROCEDURE — 36415 COLL VENOUS BLD VENIPUNCTURE: CPT | Mod: PO

## 2018-12-10 ENCOUNTER — LAB VISIT (OUTPATIENT)
Dept: LAB | Facility: HOSPITAL | Age: 53
End: 2018-12-10
Attending: INTERNAL MEDICINE
Payer: COMMERCIAL

## 2018-12-10 DIAGNOSIS — M32.9 SLE (SYSTEMIC LUPUS ERYTHEMATOSUS): ICD-10-CM

## 2018-12-10 LAB
ALBUMIN SERPL BCP-MCNC: 3.7 G/DL
ALP SERPL-CCNC: 73 U/L
ALT SERPL W/O P-5'-P-CCNC: 9 U/L
ANION GAP SERPL CALC-SCNC: 10 MMOL/L
AST SERPL-CCNC: 16 U/L
BASOPHILS # BLD AUTO: 0.05 K/UL
BASOPHILS NFR BLD: 1.8 %
BILIRUB SERPL-MCNC: 0.7 MG/DL
BUN SERPL-MCNC: 17 MG/DL
C3 SERPL-MCNC: 83 MG/DL
C4 SERPL-MCNC: 9 MG/DL
CALCIUM SERPL-MCNC: 8.9 MG/DL
CHLORIDE SERPL-SCNC: 105 MMOL/L
CO2 SERPL-SCNC: 25 MMOL/L
CREAT SERPL-MCNC: 0.9 MG/DL
CRP SERPL-MCNC: 0.3 MG/L
DIFFERENTIAL METHOD: ABNORMAL
EOSINOPHIL # BLD AUTO: 0 K/UL
EOSINOPHIL NFR BLD: 1.1 %
ERYTHROCYTE [DISTWIDTH] IN BLOOD BY AUTOMATED COUNT: 13.3 %
ERYTHROCYTE [SEDIMENTATION RATE] IN BLOOD BY WESTERGREN METHOD: <2 MM/HR
EST. GFR  (AFRICAN AMERICAN): >60 ML/MIN/1.73 M^2
EST. GFR  (NON AFRICAN AMERICAN): >60 ML/MIN/1.73 M^2
GLUCOSE SERPL-MCNC: 63 MG/DL
HCT VFR BLD AUTO: 42.3 %
HGB BLD-MCNC: 13.8 G/DL
IMM GRANULOCYTES # BLD AUTO: 0.01 K/UL
IMM GRANULOCYTES NFR BLD AUTO: 0.4 %
LYMPHOCYTES # BLD AUTO: 0.4 K/UL
LYMPHOCYTES NFR BLD: 12.9 %
MCH RBC QN AUTO: 32.2 PG
MCHC RBC AUTO-ENTMCNC: 32.6 G/DL
MCV RBC AUTO: 99 FL
MONOCYTES # BLD AUTO: 0.3 K/UL
MONOCYTES NFR BLD: 12.5 %
NEUTROPHILS # BLD AUTO: 1.9 K/UL
NEUTROPHILS NFR BLD: 71.3 %
NRBC BLD-RTO: 0 /100 WBC
PLATELET # BLD AUTO: 263 K/UL
PMV BLD AUTO: 10.2 FL
POTASSIUM SERPL-SCNC: 4.3 MMOL/L
PROT SERPL-MCNC: 7.2 G/DL
RBC # BLD AUTO: 4.28 M/UL
SODIUM SERPL-SCNC: 140 MMOL/L
WBC # BLD AUTO: 2.72 K/UL

## 2018-12-10 PROCEDURE — 80053 COMPREHEN METABOLIC PANEL: CPT

## 2018-12-10 PROCEDURE — 86160 COMPLEMENT ANTIGEN: CPT

## 2018-12-10 PROCEDURE — 86140 C-REACTIVE PROTEIN: CPT

## 2018-12-10 PROCEDURE — 85025 COMPLETE CBC W/AUTO DIFF WBC: CPT

## 2018-12-10 PROCEDURE — 86160 COMPLEMENT ANTIGEN: CPT | Mod: 59

## 2018-12-10 PROCEDURE — 85652 RBC SED RATE AUTOMATED: CPT

## 2018-12-10 PROCEDURE — 86225 DNA ANTIBODY NATIVE: CPT

## 2018-12-10 PROCEDURE — 36415 COLL VENOUS BLD VENIPUNCTURE: CPT | Mod: PO

## 2018-12-11 ENCOUNTER — TELEPHONE (OUTPATIENT)
Dept: RHEUMATOLOGY | Facility: CLINIC | Age: 53
End: 2018-12-11

## 2018-12-11 DIAGNOSIS — M32.19 OTHER SYSTEMIC LUPUS ERYTHEMATOSUS WITH OTHER ORGAN INVOLVEMENT: ICD-10-CM

## 2018-12-11 LAB — DSDNA AB SER-ACNC: NORMAL [IU]/ML

## 2018-12-11 RX ORDER — AZATHIOPRINE 50 MG/1
100 TABLET ORAL DAILY
Qty: 180 TABLET | Refills: 0 | Status: SHIPPED | OUTPATIENT
Start: 2018-12-11 | End: 2019-08-16 | Stop reason: SDUPTHER

## 2018-12-13 ENCOUNTER — TELEPHONE (OUTPATIENT)
Dept: RHEUMATOLOGY | Facility: CLINIC | Age: 53
End: 2018-12-13

## 2018-12-13 ENCOUNTER — HOSPITAL ENCOUNTER (OUTPATIENT)
Dept: RADIOLOGY | Facility: CLINIC | Age: 53
Discharge: HOME | End: 2018-12-13
Attending: INTERNAL MEDICINE
Payer: COMMERCIAL

## 2018-12-13 ENCOUNTER — IMMUNIZATION (OUTPATIENT)
Dept: PHARMACY | Facility: CLINIC | Age: 53
End: 2018-12-13
Payer: COMMERCIAL

## 2018-12-13 ENCOUNTER — OFFICE VISIT (OUTPATIENT)
Dept: RHEUMATOLOGY | Facility: CLINIC | Age: 53
End: 2018-12-13
Payer: COMMERCIAL

## 2018-12-13 VITALS
HEIGHT: 64 IN | BODY MASS INDEX: 22.96 KG/M2 | HEART RATE: 64 BPM | WEIGHT: 134.5 LBS | SYSTOLIC BLOOD PRESSURE: 127 MMHG | DIASTOLIC BLOOD PRESSURE: 64 MMHG

## 2018-12-13 DIAGNOSIS — M77.11 LATERAL EPICONDYLITIS OF RIGHT ELBOW: ICD-10-CM

## 2018-12-13 DIAGNOSIS — N20.0 KIDNEY STONES: ICD-10-CM

## 2018-12-13 DIAGNOSIS — Z78.0 MENOPAUSE: ICD-10-CM

## 2018-12-13 DIAGNOSIS — M32.8 OTHER FORMS OF SYSTEMIC LUPUS ERYTHEMATOSUS, UNSPECIFIED ORGAN INVOLVEMENT STATUS: Primary | ICD-10-CM

## 2018-12-13 DIAGNOSIS — E03.9 HYPOTHYROIDISM, UNSPECIFIED TYPE: ICD-10-CM

## 2018-12-13 PROBLEM — M17.12 OSTEOARTHRITIS OF LEFT KNEE: Status: RESOLVED | Noted: 2018-04-25 | Resolved: 2018-12-13

## 2018-12-13 PROCEDURE — 99999 PR PBB SHADOW E&M-EST. PATIENT-LVL III: CPT | Mod: PBBFAC,,, | Performed by: INTERNAL MEDICINE

## 2018-12-13 PROCEDURE — 77080 DXA BONE DENSITY AXIAL: CPT | Mod: 26,,, | Performed by: INTERNAL MEDICINE

## 2018-12-13 PROCEDURE — 3008F BODY MASS INDEX DOCD: CPT | Mod: CPTII,S$GLB,, | Performed by: INTERNAL MEDICINE

## 2018-12-13 PROCEDURE — 99214 OFFICE O/P EST MOD 30 MIN: CPT | Mod: S$GLB,,, | Performed by: INTERNAL MEDICINE

## 2018-12-13 PROCEDURE — 77080 DXA BONE DENSITY AXIAL: CPT | Mod: TC

## 2018-12-13 ASSESSMENT — ROUTINE ASSESSMENT OF PATIENT INDEX DATA (RAPID3)
AM STIFFNESS SCORE: 0, NO
TOTAL RAPID3 SCORE: 0
FATIGUE SCORE: 0
PSYCHOLOGICAL DISTRESS SCORE: 0
PAIN SCORE: 0
MDHAQ FUNCTION SCORE: 0
PATIENT GLOBAL ASSESSMENT SCORE: 0

## 2018-12-13 ASSESSMENT — SYSTEMIC LUPUS ERYTHEMATOSUS DISEASE ACTIVITY INDEX (SLEDAI): TOTAL_SCORE: 3

## 2018-12-13 NOTE — PROGRESS NOTES
"Subjective:       Patient ID: Madeleine Nunez is a 53 y.o. female.    Chief Complaint: SLE    HPI SLE pt here for f/u. At her last visit, she was having L knee pain after having trained for a marathon, and she was told to avoid heavy weight bearing exercises on her knees and to avoid excessive squatting and kneeling. She was recently found to have leukopenia in a routine lab, so we decreased her azathioprine to 100 mg daily. Last night was her first night on azathioprine. She continues to take prednisone 2.5 mg daily.    Since her last visit, she reports the knee is doing much better since starting the glucosamine chondroitin and turmeric. She has started a new hormone-replacement BioTE "pellet" that has improved her health and exercise regimen. This was given to Dr. Ian Lopez in [facility], an OB/GYN. She is  and denies any history of miscarriage. No pulmonary embolus, thrombophlebitis, DVT, or other blood clot disorder.    In terms of her lupus, she reports no recent disease activity as detailed in the SLEDAI.    Review of Systems    Review of Systems   Constitutional: Negative for chills, fever and unexpected weight change.   HENT: Negative for congestion, ear pain, hearing loss, rhinorrhea, sore throat and trouble swallowing.    Eyes: Negative for discharge, redness and visual disturbance.   Respiratory: Negative for cough, chest tightness, shortness of breath and wheezing.    Cardiovascular: Negative for chest pain, palpitations and leg swelling.   Gastrointestinal: Negative for abdominal pain, constipation, diarrhea, nausea and vomiting.   Endocrine: Negative for polydipsia, polyphagia and polyuria.   Genitourinary: Negative for decreased urine volume, dysuria and hematuria.   Musculoskeletal: Negative for arthralgias and myalgias. Negative for joint swelling.   Skin: Negative for color change and rash.   Neurological: Negative for dizziness, weakness, light-headedness and headaches. " "  Psychiatric/Behavioral: Negative for decreased concentration, dysphoric mood, sleep disturbance and suicidal ideas.   Objective:       Vitals:    12/13/18 1127   BP: 127/64   Pulse: 64   Weight: 61 kg (134 lb 8 oz)   Height: 5' 3.6" (1.615 m)     Physical Exam   Nursing note and vitals reviewed.  Constitutional: She is oriented to person, place, and time and well-developed, well-nourished, and in no distress.   HENT:   Head: Normocephalic and atraumatic.   Eyes: Conjunctivae are normal. Right eye exhibits no discharge. Left eye exhibits no discharge.   Neck: Normal range of motion. Neck supple.   Cardiovascular: Normal rate and regular rhythm.    Pulmonary/Chest: Effort normal and breath sounds normal.   Abdominal: Soft. There is no guarding.       Right Side Rheumatological Exam     Elbow/Wrist Exam   Tenderness Location: lateral epicondyle      Neurological: She is alert and oriented to person, place, and time. Gait normal.   Skin: Skin is warm. No rash noted.     Psychiatric: Mood, affect and judgment normal.   Musculoskeletal: She exhibits no edema.   + Cozen's test on the R elbow; - on the L           Assessment:   SLE    SLEDAI 3 (leukopenia and low complement; C3 wnl C4 low)  ESR <2; CRP 0.3  Plan:     1. SLE  - Continue Azathioprine 100 mg daily; recently decreased from 125 2/2 leukopenia  - Will continue to monitor CBC and CMP monthly; will repeat CBC in 8 days to monitor for resolution  Of leukopenia  - Continue Deltazone 2.5 mg daily as C4 complement has not improved (C3 remains wnl);   will consider DCing at future visitsIf/when it does  - Urinalysis today; other lupus clotting labs and Vit D at next visit    2. Health Maintenance  - DEXA performed today;  7.5% risk for major osteoporotic and 0.3% hip FRAX risk  - 1200 mg dietary calcium and 600 units vit D  - Received flu shot today    3. L knee OA  - Cont glucosamine-chondroitin [id number], turmeric 500-1000 mg    4. Hormone replacement therapy  - " Will repeat lupus anticoagulant, B2-glycoprotein, cardiolipin, RPR  - Pt denies history of miscarriage, PE, any blood clots or thrombophlebitis    5. R Lateral epicondylitis  - Pt advised to get tennis elbow brace at local pharmacy    RTC in 3 months    Elaine Bauer MD  PGY-1 [employer] PM[facility]

## 2018-12-13 NOTE — PROGRESS NOTES
I have personally taken the history and examined the patient and agree with the resident,s note as stated above. Asymptomatic except occ pain and limited rom left knee and also right tennis elbow.. knee . Attending [facility] [employer]. Started Biote hormone for menopausal symptoms and feels better overall, dramatically so: including energy, left knee etc.    Exam; crepitus and limited from left knee. Right lat epicondylitis all maneuvers+    DXA normal.      SLE: SLEDAI= 3 pending U/A  Mild leukopenia  Right lateral epicondylitis  OA left knee      Cont reduced dose of azathioprine(100mg) daily due to leukopenia  Cont prednisone 2.5 mg daily until see effects of reduced azathioprine.  Recheck cbc in one week.  APS panel, TSH, freeT4 in one week  Tennis elbow forearm brace when at [facility], ice massage prn  Cont glucosamine-chondroitin, turmeric  Had flu shot today, deferred Tdap  RTC 3 months with standing lupus labs.

## 2018-12-27 ENCOUNTER — PATIENT MESSAGE (OUTPATIENT)
Dept: RHEUMATOLOGY | Facility: CLINIC | Age: 53
End: 2018-12-27

## 2018-12-27 ENCOUNTER — LAB VISIT (OUTPATIENT)
Dept: LAB | Facility: HOSPITAL | Age: 53
End: 2018-12-27
Attending: INTERNAL MEDICINE
Payer: COMMERCIAL

## 2018-12-27 DIAGNOSIS — M32.9 SLE (SYSTEMIC LUPUS ERYTHEMATOSUS): ICD-10-CM

## 2018-12-27 DIAGNOSIS — Z79.624 ENCOUNTER FOR LONG TERM CURRENT USE OF AZATHIOPRINE: ICD-10-CM

## 2018-12-27 DIAGNOSIS — M32.19 OTHER SYSTEMIC LUPUS ERYTHEMATOSUS WITH OTHER ORGAN INVOLVEMENT: ICD-10-CM

## 2018-12-27 DIAGNOSIS — M32.8 OTHER FORMS OF SYSTEMIC LUPUS ERYTHEMATOSUS, UNSPECIFIED ORGAN INVOLVEMENT STATUS: ICD-10-CM

## 2018-12-27 LAB
ALBUMIN SERPL BCP-MCNC: 3.6 G/DL
ALBUMIN SERPL BCP-MCNC: 3.6 G/DL
ALP SERPL-CCNC: 73 U/L
ALP SERPL-CCNC: 73 U/L
ALT SERPL W/O P-5'-P-CCNC: 13 U/L
ALT SERPL W/O P-5'-P-CCNC: 13 U/L
ANION GAP SERPL CALC-SCNC: 8 MMOL/L
ANION GAP SERPL CALC-SCNC: 8 MMOL/L
AST SERPL-CCNC: 20 U/L
AST SERPL-CCNC: 20 U/L
BASOPHILS # BLD AUTO: 0.04 K/UL
BASOPHILS NFR BLD: 1 %
BILIRUB SERPL-MCNC: 0.5 MG/DL
BILIRUB SERPL-MCNC: 0.5 MG/DL
BUN SERPL-MCNC: 18 MG/DL
BUN SERPL-MCNC: 18 MG/DL
C3 SERPL-MCNC: 85 MG/DL
C4 SERPL-MCNC: 10 MG/DL
CALCIUM SERPL-MCNC: 8.8 MG/DL
CALCIUM SERPL-MCNC: 8.8 MG/DL
CHLORIDE SERPL-SCNC: 103 MMOL/L
CHLORIDE SERPL-SCNC: 103 MMOL/L
CO2 SERPL-SCNC: 28 MMOL/L
CO2 SERPL-SCNC: 28 MMOL/L
CREAT SERPL-MCNC: 0.8 MG/DL
CREAT SERPL-MCNC: 0.8 MG/DL
CRP SERPL-MCNC: 0.4 MG/L
DIFFERENTIAL METHOD: ABNORMAL
EOSINOPHIL # BLD AUTO: 0.1 K/UL
EOSINOPHIL NFR BLD: 2.2 %
ERYTHROCYTE [DISTWIDTH] IN BLOOD BY AUTOMATED COUNT: 12.9 %
ERYTHROCYTE [SEDIMENTATION RATE] IN BLOOD BY WESTERGREN METHOD: 4 MM/HR
EST. GFR  (AFRICAN AMERICAN): >60 ML/MIN/1.73 M^2
EST. GFR  (AFRICAN AMERICAN): >60 ML/MIN/1.73 M^2
EST. GFR  (NON AFRICAN AMERICAN): >60 ML/MIN/1.73 M^2
EST. GFR  (NON AFRICAN AMERICAN): >60 ML/MIN/1.73 M^2
GLUCOSE SERPL-MCNC: 99 MG/DL
GLUCOSE SERPL-MCNC: 99 MG/DL
HCT VFR BLD AUTO: 39.7 %
HGB BLD-MCNC: 13.1 G/DL
IMM GRANULOCYTES # BLD AUTO: 0.01 K/UL
IMM GRANULOCYTES NFR BLD AUTO: 0.2 %
LYMPHOCYTES # BLD AUTO: 0.6 K/UL
LYMPHOCYTES NFR BLD: 13.5 %
MCH RBC QN AUTO: 32 PG
MCHC RBC AUTO-ENTMCNC: 33 G/DL
MCV RBC AUTO: 97 FL
MONOCYTES # BLD AUTO: 0.5 K/UL
MONOCYTES NFR BLD: 11.1 %
NEUTROPHILS # BLD AUTO: 2.9 K/UL
NEUTROPHILS NFR BLD: 72 %
NRBC BLD-RTO: 0 /100 WBC
PLATELET # BLD AUTO: 223 K/UL
PMV BLD AUTO: 10.3 FL
POTASSIUM SERPL-SCNC: 3.7 MMOL/L
POTASSIUM SERPL-SCNC: 3.7 MMOL/L
PROT SERPL-MCNC: 6.9 G/DL
PROT SERPL-MCNC: 6.9 G/DL
RBC # BLD AUTO: 4.1 M/UL
SODIUM SERPL-SCNC: 139 MMOL/L
SODIUM SERPL-SCNC: 139 MMOL/L
WBC # BLD AUTO: 4.07 K/UL

## 2018-12-27 PROCEDURE — 86225 DNA ANTIBODY NATIVE: CPT

## 2018-12-27 PROCEDURE — 80053 COMPREHEN METABOLIC PANEL: CPT

## 2018-12-27 PROCEDURE — 86147 CARDIOLIPIN ANTIBODY EA IG: CPT | Mod: 59

## 2018-12-27 PROCEDURE — 86146 BETA-2 GLYCOPROTEIN ANTIBODY: CPT | Mod: 59

## 2018-12-27 PROCEDURE — 86592 SYPHILIS TEST NON-TREP QUAL: CPT

## 2018-12-27 PROCEDURE — 85652 RBC SED RATE AUTOMATED: CPT

## 2018-12-27 PROCEDURE — 85025 COMPLETE CBC W/AUTO DIFF WBC: CPT

## 2018-12-27 PROCEDURE — 86160 COMPLEMENT ANTIGEN: CPT | Mod: 59

## 2018-12-27 PROCEDURE — 36415 COLL VENOUS BLD VENIPUNCTURE: CPT | Mod: PO

## 2018-12-27 PROCEDURE — 86160 COMPLEMENT ANTIGEN: CPT

## 2018-12-27 PROCEDURE — 86140 C-REACTIVE PROTEIN: CPT

## 2018-12-27 PROCEDURE — 85613 RUSSELL VIPER VENOM DILUTED: CPT

## 2018-12-28 LAB
CARDIOLIPIN IGG SER IA-ACNC: <9.4 GPL
CARDIOLIPIN IGM SER IA-ACNC: <9.4 MPL
DSDNA AB SER-ACNC: NORMAL [IU]/ML
LA PPP-IMP: NEGATIVE
RPR SER QL: NORMAL

## 2018-12-29 RX ORDER — NITROFURANTOIN (MACROCRYSTALS) 100 MG/1
100 CAPSULE ORAL EVERY 12 HOURS
Qty: 10 CAPSULE | Refills: 0 | Status: SHIPPED | OUTPATIENT
Start: 2018-12-29 | End: 2019-01-03

## 2019-01-02 LAB
B2 GLYCOPROT1 IGA SER QL: <9 SAU
B2 GLYCOPROT1 IGG SER QL: <9 SGU
B2 GLYCOPROT1 IGM SER QL: <9 SMU

## 2019-01-24 ENCOUNTER — PROCEDURE VISIT (OUTPATIENT)
Dept: OBSTETRICS AND GYNECOLOGY | Facility: CLINIC | Age: 54
End: 2019-01-24
Payer: COMMERCIAL

## 2019-01-24 VITALS
HEIGHT: 63 IN | BODY MASS INDEX: 24.88 KG/M2 | DIASTOLIC BLOOD PRESSURE: 72 MMHG | WEIGHT: 140.44 LBS | SYSTOLIC BLOOD PRESSURE: 120 MMHG

## 2019-01-24 DIAGNOSIS — N84.1 CERVICAL POLYP: ICD-10-CM

## 2019-01-24 DIAGNOSIS — R87.619 ABNORMAL CERVICAL PAPANICOLAOU SMEAR, UNSPECIFIED ABNORMAL PAP FINDING: Primary | ICD-10-CM

## 2019-01-24 DIAGNOSIS — N93.0 POSTCOITAL BLEEDING: ICD-10-CM

## 2019-01-24 PROCEDURE — 99213 PR OFFICE/OUTPT VISIT, EST, LEVL III, 20-29 MIN: ICD-10-PCS | Mod: S$GLB,,, | Performed by: OBSTETRICS & GYNECOLOGY

## 2019-01-24 PROCEDURE — 99213 OFFICE O/P EST LOW 20 MIN: CPT | Mod: S$GLB,,, | Performed by: OBSTETRICS & GYNECOLOGY

## 2019-01-24 PROCEDURE — 88175 CYTOPATH C/V AUTO FLUID REDO: CPT

## 2019-01-24 PROCEDURE — 87624 HPV HI-RISK TYP POOLED RSLT: CPT

## 2019-01-24 RX ORDER — SPIRONOLACTONE 50 MG/1
TABLET, FILM COATED ORAL
Refills: 5 | COMMUNITY
Start: 2018-12-28

## 2019-01-24 RX ORDER — PREDNISONE 5 MG/1
TABLET ORAL
Refills: 2 | COMMUNITY
Start: 2018-12-04 | End: 2019-03-08

## 2019-01-24 RX ORDER — PROGESTERONE 200 MG/1
CAPSULE ORAL
Refills: 6 | COMMUNITY
Start: 2018-12-11 | End: 2023-09-25

## 2019-01-24 NOTE — PROCEDURES
SPECIMEN  1) External os.  FINAL PATHOLOGIC DIAGNOSIS  FRAGMENTS OF INFLAMED GRANULATION TISSUE, NO MALIGNANCY OR DYSPLASIA IDENTIFIED        COLPOSCOPY:    Madeleine Nunez is a 53 y.o. female   presents for colposcopy - not due to abnormal paps but to further work up her postcoital bleeding. It was suspected this was due to a polyp that was removed - that was benign as seen in the path above. Today we will repeat her pap smear. .  Patient's last menstrual period was 2013..  Her most recent pap smear shows unsatisfactoy results.      since last visit she was seen by Rad Lopez and was started on injection pellets of hormone (she is not sure which ones but will contact us back with this). And states she feels 'great' since the pellet was placed: more energy, less joint pain (she is working w her rheumatologist who said she does not have an increased DVT risk w her SLE), better libido; and actually less postcoital bleeding (of course - the polyp was also removed last visit).      COLPOSCOPY EXAM:   TIME OUT PERFORMED.     There were no vulvar, vaginal or cervical lesions.  The transformation zone was visualized and there were no lesions - only in the site of removal of the polyp this area was slightly friable and spotted when the Pap smear was done. Therefore it was cauterized with silver nitrate. Pt tolerated this well.   Biopsy and ECC were not done as the colpo view was normal .      F/u one year if pap and HPV are normal negative.

## 2019-01-30 LAB
HPV HR 12 DNA CVX QL NAA+PROBE: NEGATIVE
HPV16 AG SPEC QL: NEGATIVE
HPV18 DNA SPEC QL NAA+PROBE: NEGATIVE

## 2019-01-31 ENCOUNTER — HOSPITAL ENCOUNTER (OUTPATIENT)
Dept: RADIOLOGY | Facility: HOSPITAL | Age: 54
Discharge: HOME | End: 2019-01-31
Attending: OBSTETRICS & GYNECOLOGY
Payer: COMMERCIAL

## 2019-01-31 VITALS — WEIGHT: 140 LBS | HEIGHT: 63 IN | BODY MASS INDEX: 24.8 KG/M2

## 2019-01-31 DIAGNOSIS — Z01.419 WELL WOMAN EXAM WITH ROUTINE GYNECOLOGICAL EXAM: ICD-10-CM

## 2019-01-31 PROCEDURE — 77067 SCR MAMMO BI INCL CAD: CPT | Mod: TC

## 2019-01-31 PROCEDURE — 77063 BREAST TOMOSYNTHESIS BI: CPT | Mod: 26,,, | Performed by: RADIOLOGY

## 2019-01-31 PROCEDURE — 77067 SCR MAMMO BI INCL CAD: CPT | Mod: 26,,, | Performed by: RADIOLOGY

## 2019-01-31 PROCEDURE — 77067 MAMMO DIGITAL SCREENING BILAT WITH TOMOSYNTHESIS_CAD: ICD-10-PCS | Mod: 26,,, | Performed by: RADIOLOGY

## 2019-01-31 PROCEDURE — 77063 MAMMO DIGITAL SCREENING BILAT WITH TOMOSYNTHESIS_CAD: ICD-10-PCS | Mod: 26,,, | Performed by: RADIOLOGY

## 2019-02-20 ENCOUNTER — TELEPHONE (OUTPATIENT)
Dept: RHEUMATOLOGY | Facility: CLINIC | Age: 54
End: 2019-02-20

## 2019-03-07 DIAGNOSIS — M32.19 OTHER SYSTEMIC LUPUS ERYTHEMATOSUS WITH OTHER ORGAN INVOLVEMENT: ICD-10-CM

## 2019-03-08 RX ORDER — PREDNISONE 5 MG/1
TABLET ORAL
Qty: 45 TABLET | Refills: 2 | OUTPATIENT
Start: 2019-03-08

## 2019-03-08 RX ORDER — PREDNISONE 5 MG/1
2.5 TABLET ORAL DAILY
Qty: 15 TABLET | Refills: 1 | Status: SHIPPED | OUTPATIENT
Start: 2019-03-08 | End: 2019-05-05 | Stop reason: SDUPTHER

## 2019-03-13 ENCOUNTER — PATIENT MESSAGE (OUTPATIENT)
Dept: RHEUMATOLOGY | Facility: CLINIC | Age: 54
End: 2019-03-13

## 2019-04-02 ENCOUNTER — PATIENT MESSAGE (OUTPATIENT)
Dept: RHEUMATOLOGY | Facility: CLINIC | Age: 54
End: 2019-04-02

## 2019-04-04 ENCOUNTER — LAB VISIT (OUTPATIENT)
Dept: LAB | Facility: HOSPITAL | Age: 54
End: 2019-04-04
Attending: INTERNAL MEDICINE
Payer: COMMERCIAL

## 2019-04-04 DIAGNOSIS — M32.9 SLE (SYSTEMIC LUPUS ERYTHEMATOSUS): ICD-10-CM

## 2019-04-04 LAB
ALBUMIN SERPL BCP-MCNC: 3.6 G/DL (ref 3.5–5.2)
ALP SERPL-CCNC: 63 U/L (ref 55–135)
ALT SERPL W/O P-5'-P-CCNC: 12 U/L (ref 10–44)
ANION GAP SERPL CALC-SCNC: 8 MMOL/L (ref 8–16)
AST SERPL-CCNC: 22 U/L (ref 10–40)
BILIRUB SERPL-MCNC: 0.7 MG/DL (ref 0.1–1)
BUN SERPL-MCNC: 16 MG/DL (ref 6–20)
C3 SERPL-MCNC: 88 MG/DL (ref 50–180)
C4 SERPL-MCNC: 11 MG/DL (ref 11–44)
CALCIUM SERPL-MCNC: 9.2 MG/DL (ref 8.7–10.5)
CHLORIDE SERPL-SCNC: 104 MMOL/L (ref 95–110)
CO2 SERPL-SCNC: 26 MMOL/L (ref 23–29)
CREAT SERPL-MCNC: 0.9 MG/DL (ref 0.5–1.4)
CRP SERPL-MCNC: 0.3 MG/L (ref 0–8.2)
ERYTHROCYTE [SEDIMENTATION RATE] IN BLOOD BY WESTERGREN METHOD: 11 MM/HR (ref 0–36)
EST. GFR  (AFRICAN AMERICAN): >60 ML/MIN/1.73 M^2
EST. GFR  (NON AFRICAN AMERICAN): >60 ML/MIN/1.73 M^2
GLUCOSE SERPL-MCNC: 96 MG/DL (ref 70–110)
POTASSIUM SERPL-SCNC: 3.8 MMOL/L (ref 3.5–5.1)
PROT SERPL-MCNC: 6.7 G/DL (ref 6–8.4)
SODIUM SERPL-SCNC: 138 MMOL/L (ref 136–145)

## 2019-04-04 PROCEDURE — 86140 C-REACTIVE PROTEIN: CPT

## 2019-04-04 PROCEDURE — 36415 COLL VENOUS BLD VENIPUNCTURE: CPT | Mod: PO

## 2019-04-04 PROCEDURE — 86160 COMPLEMENT ANTIGEN: CPT | Mod: 59

## 2019-04-04 PROCEDURE — 86225 DNA ANTIBODY NATIVE: CPT

## 2019-04-04 PROCEDURE — 85652 RBC SED RATE AUTOMATED: CPT

## 2019-04-04 PROCEDURE — 80053 COMPREHEN METABOLIC PANEL: CPT

## 2019-04-04 PROCEDURE — 86160 COMPLEMENT ANTIGEN: CPT

## 2019-04-05 LAB — DSDNA AB SER-ACNC: NORMAL [IU]/ML

## 2019-05-05 ENCOUNTER — PATIENT MESSAGE (OUTPATIENT)
Dept: RHEUMATOLOGY | Facility: CLINIC | Age: 54
End: 2019-05-05

## 2019-05-05 ENCOUNTER — TELEPHONE (OUTPATIENT)
Dept: RHEUMATOLOGY | Facility: CLINIC | Age: 54
End: 2019-05-05

## 2019-05-05 RX ORDER — PREDNISONE 5 MG/1
TABLET ORAL
Qty: 15 TABLET | Refills: 1 | Status: SHIPPED | OUTPATIENT
Start: 2019-05-05 | End: 2019-07-03 | Stop reason: SDUPTHER

## 2019-05-07 ENCOUNTER — PATIENT MESSAGE (OUTPATIENT)
Dept: RHEUMATOLOGY | Facility: CLINIC | Age: 54
End: 2019-05-07

## 2019-05-14 ENCOUNTER — PATIENT MESSAGE (OUTPATIENT)
Dept: RHEUMATOLOGY | Facility: CLINIC | Age: 54
End: 2019-05-14

## 2019-06-06 ENCOUNTER — TELEPHONE (OUTPATIENT)
Dept: RHEUMATOLOGY | Facility: CLINIC | Age: 54
End: 2019-06-06

## 2019-06-10 ENCOUNTER — PATIENT MESSAGE (OUTPATIENT)
Dept: RHEUMATOLOGY | Facility: CLINIC | Age: 54
End: 2019-06-10

## 2019-06-13 ENCOUNTER — PATIENT MESSAGE (OUTPATIENT)
Dept: RHEUMATOLOGY | Facility: CLINIC | Age: 54
End: 2019-06-13

## 2019-06-13 ENCOUNTER — LAB VISIT (OUTPATIENT)
Dept: LAB | Facility: HOSPITAL | Age: 54
End: 2019-06-13
Attending: INTERNAL MEDICINE
Payer: COMMERCIAL

## 2019-06-13 DIAGNOSIS — Z79.624 ENCOUNTER FOR LONG TERM CURRENT USE OF AZATHIOPRINE: ICD-10-CM

## 2019-06-13 DIAGNOSIS — M32.19 OTHER SYSTEMIC LUPUS ERYTHEMATOSUS WITH OTHER ORGAN INVOLVEMENT: ICD-10-CM

## 2019-06-13 LAB
ALBUMIN SERPL BCP-MCNC: 3.6 G/DL (ref 3.5–5.2)
ALP SERPL-CCNC: 68 U/L (ref 55–135)
ALT SERPL W/O P-5'-P-CCNC: 16 U/L (ref 10–44)
ANION GAP SERPL CALC-SCNC: 5 MMOL/L (ref 8–16)
AST SERPL-CCNC: 17 U/L (ref 10–40)
BASOPHILS # BLD AUTO: 0.04 K/UL (ref 0–0.2)
BASOPHILS NFR BLD: 0.9 % (ref 0–1.9)
BILIRUB SERPL-MCNC: 0.6 MG/DL (ref 0.1–1)
BUN SERPL-MCNC: 15 MG/DL (ref 6–20)
CALCIUM SERPL-MCNC: 8.9 MG/DL (ref 8.7–10.5)
CHLORIDE SERPL-SCNC: 104 MMOL/L (ref 95–110)
CO2 SERPL-SCNC: 29 MMOL/L (ref 23–29)
CREAT SERPL-MCNC: 0.8 MG/DL (ref 0.5–1.4)
DIFFERENTIAL METHOD: ABNORMAL
EOSINOPHIL # BLD AUTO: 0 K/UL (ref 0–0.5)
EOSINOPHIL NFR BLD: 0.9 % (ref 0–8)
ERYTHROCYTE [DISTWIDTH] IN BLOOD BY AUTOMATED COUNT: 12.4 % (ref 11.5–14.5)
EST. GFR  (AFRICAN AMERICAN): >60 ML/MIN/1.73 M^2
EST. GFR  (NON AFRICAN AMERICAN): >60 ML/MIN/1.73 M^2
GLUCOSE SERPL-MCNC: 76 MG/DL (ref 70–110)
HCT VFR BLD AUTO: 39.6 % (ref 37–48.5)
HGB BLD-MCNC: 13.1 G/DL (ref 12–16)
IMM GRANULOCYTES # BLD AUTO: 0.01 K/UL (ref 0–0.04)
IMM GRANULOCYTES NFR BLD AUTO: 0.2 % (ref 0–0.5)
LYMPHOCYTES # BLD AUTO: 0.7 K/UL (ref 1–4.8)
LYMPHOCYTES NFR BLD: 17.1 % (ref 18–48)
MCH RBC QN AUTO: 33.4 PG (ref 27–31)
MCHC RBC AUTO-ENTMCNC: 33.1 G/DL (ref 32–36)
MCV RBC AUTO: 101 FL (ref 82–98)
MONOCYTES # BLD AUTO: 0.5 K/UL (ref 0.3–1)
MONOCYTES NFR BLD: 12.1 % (ref 4–15)
NEUTROPHILS # BLD AUTO: 2.9 K/UL (ref 1.8–7.7)
NEUTROPHILS NFR BLD: 68.8 % (ref 38–73)
NRBC BLD-RTO: 0 /100 WBC
PLATELET # BLD AUTO: 227 K/UL (ref 150–350)
PMV BLD AUTO: 10.8 FL (ref 9.2–12.9)
POTASSIUM SERPL-SCNC: 3.8 MMOL/L (ref 3.5–5.1)
PROT SERPL-MCNC: 6.8 G/DL (ref 6–8.4)
RBC # BLD AUTO: 3.92 M/UL (ref 4–5.4)
SODIUM SERPL-SCNC: 138 MMOL/L (ref 136–145)
WBC # BLD AUTO: 4.22 K/UL (ref 3.9–12.7)

## 2019-06-13 PROCEDURE — 85025 COMPLETE CBC W/AUTO DIFF WBC: CPT

## 2019-06-13 PROCEDURE — 80053 COMPREHEN METABOLIC PANEL: CPT

## 2019-06-13 PROCEDURE — 36415 COLL VENOUS BLD VENIPUNCTURE: CPT | Mod: PO

## 2019-07-03 RX ORDER — PREDNISONE 5 MG/1
TABLET ORAL
Qty: 15 TABLET | Refills: 1 | Status: SHIPPED | OUTPATIENT
Start: 2019-07-03 | End: 2019-08-16 | Stop reason: SDUPTHER

## 2019-08-14 ENCOUNTER — PATIENT MESSAGE (OUTPATIENT)
Dept: RHEUMATOLOGY | Facility: CLINIC | Age: 54
End: 2019-08-14

## 2019-08-16 ENCOUNTER — OFFICE VISIT (OUTPATIENT)
Dept: RHEUMATOLOGY | Facility: CLINIC | Age: 54
End: 2019-08-16
Payer: COMMERCIAL

## 2019-08-16 VITALS
HEIGHT: 65 IN | DIASTOLIC BLOOD PRESSURE: 40 MMHG | HEART RATE: 64 BPM | SYSTOLIC BLOOD PRESSURE: 99 MMHG | WEIGHT: 138.88 LBS | BODY MASS INDEX: 23.14 KG/M2

## 2019-08-16 DIAGNOSIS — M32.19 OTHER SYSTEMIC LUPUS ERYTHEMATOSUS WITH OTHER ORGAN INVOLVEMENT: ICD-10-CM

## 2019-08-16 PROCEDURE — 99214 OFFICE O/P EST MOD 30 MIN: CPT | Mod: S$GLB,,, | Performed by: INTERNAL MEDICINE

## 2019-08-16 PROCEDURE — 3008F PR BODY MASS INDEX (BMI) DOCUMENTED: ICD-10-PCS | Mod: CPTII,S$GLB,, | Performed by: INTERNAL MEDICINE

## 2019-08-16 PROCEDURE — 99999 PR PBB SHADOW E&M-EST. PATIENT-LVL III: CPT | Mod: PBBFAC,,, | Performed by: INTERNAL MEDICINE

## 2019-08-16 PROCEDURE — 3008F BODY MASS INDEX DOCD: CPT | Mod: CPTII,S$GLB,, | Performed by: INTERNAL MEDICINE

## 2019-08-16 PROCEDURE — 99999 PR PBB SHADOW E&M-EST. PATIENT-LVL III: ICD-10-PCS | Mod: PBBFAC,,, | Performed by: INTERNAL MEDICINE

## 2019-08-16 PROCEDURE — 99214 PR OFFICE/OUTPT VISIT, EST, LEVL IV, 30-39 MIN: ICD-10-PCS | Mod: S$GLB,,, | Performed by: INTERNAL MEDICINE

## 2019-08-16 RX ORDER — AZATHIOPRINE 50 MG/1
100 TABLET ORAL DAILY
Qty: 180 TABLET | Refills: 0 | Status: SHIPPED | OUTPATIENT
Start: 2019-08-16 | End: 2020-03-12 | Stop reason: SDUPTHER

## 2019-08-16 RX ORDER — PREDNISONE 5 MG/1
TABLET ORAL
Qty: 45 TABLET | Refills: 0 | Status: SHIPPED | OUTPATIENT
Start: 2019-08-16 | End: 2019-09-16 | Stop reason: SDUPTHER

## 2019-08-16 RX ORDER — PANTOPRAZOLE SODIUM 40 MG/1
40 TABLET, DELAYED RELEASE ORAL DAILY
COMMUNITY
End: 2020-01-06

## 2019-08-16 ASSESSMENT — ROUTINE ASSESSMENT OF PATIENT INDEX DATA (RAPID3)
PAIN SCORE: 0
PATIENT GLOBAL ASSESSMENT SCORE: 0
FATIGUE SCORE: 0
MDHAQ FUNCTION SCORE: 0
AM STIFFNESS SCORE: 0, NO
TOTAL RAPID3 SCORE: 0
PSYCHOLOGICAL DISTRESS SCORE: 0

## 2019-08-16 ASSESSMENT — SYSTEMIC LUPUS ERYTHEMATOSUS DISEASE ACTIVITY INDEX (SLEDAI): TOTAL_SCORE: 0

## 2019-08-16 NOTE — PROGRESS NOTES
"Subjective:       Patient ID: Madeleine Nunez is a 54 y.o. female.    Chief Complaint: SLE    Patient returns today for follow-up. She says that she has been taking prednisone 5mg daily and Imuran 100mg daily and tolerating it well. In regards to her SLE, she has no complaints. She has not had any flares and has continued exercising 6-7 days per week and feeling well. She has been dealing with some intermittent, painless hematuria recently for which she is being seen by her urologist, Dr. Ram, in [place]. Blood work, CT, and cystoscopy have all been negative so far. Dr. Ram would like to start her on a 3 month course of low-dose Macrodantin to see if her symptoms improve, and she is curious as to whether she can continue taking her Imuran. Otherwise, no complaints.     Review of Systems   Constitutional: Negative.    HENT: Negative.    Eyes: Negative.    Respiratory: Negative.    Cardiovascular: Negative.    Gastrointestinal: Negative.    Endocrine: Negative.    Genitourinary: Positive for hematuria.   Musculoskeletal: Negative.    Skin: Negative.    Allergic/Immunologic: Negative.    Neurological: Negative.    Hematological: Negative.    Psychiatric/Behavioral: Negative.        Objective:   BP (!) 99/40   Pulse 64   Ht 5' 4.8" (1.646 m)   Wt 63 kg (138 lb 14.4 oz)   LMP 08/18/2013   BMI 23.26 kg/m²      Physical Exam   Constitutional: She is oriented to person, place, and time. No distress.   HENT:   Head: Normocephalic and atraumatic.   Eyes: Conjunctivae and EOM are normal. Pupils are equal, round, and reactive to light.   Cardiovascular: Normal rate, regular rhythm and normal heart sounds.    Pulmonary/Chest: Effort normal and breath sounds normal. No respiratory distress.       Right Side Rheumatological Exam     Examination finds the shoulder, elbow, wrist, knee, 1st PIP, 1st MCP, 2nd PIP, 2nd MCP, 3rd PIP, 3rd MCP, 4th PIP, 4th MCP, 5th PIP and 5th MCP normal.    Left Side Rheumatological Exam "     Examination finds the shoulder, elbow, wrist, knee, 1st PIP, 1st MCP, 2nd PIP, 2nd MCP, 3rd PIP, 3rd MCP, 4th PIP, 4th MCP, 5th PIP and 5th MCP normal.    The patient has an enlarged 5th DIP.      Neurological: She is alert and oriented to person, place, and time. GCS score is 15.   Skin: Skin is warm and dry. No rash noted. She is not diaphoretic. No erythema.     Psychiatric: Mood and affect normal.   Musculoskeletal: Normal range of motion. She exhibits no edema.        Assessment:       1. SLE      Plan:       Standing lupus labs today  Decrease prednisone 2.5mg every other day  Continue glucosamine-chondroitin, turmeric  Tdap ordered  Standing lupus labs every 3 months, plan to stop prednisone is labs satisfactory in 3 months  RTC 6 months

## 2019-08-16 NOTE — PROGRESS NOTES
I have personally taken the history and examined the patient and agree with the resident,s note as stated above       SLE: SLEDAI= 0 pending all labs  Moderate lymphopenia likely related to azathioprine  Right lateral epicondylitis  OA left knee  Recurrent UTIs, now on macrodantin 50mg hs prophylaxis        Standing lupus labs today  Decrease prednisone 2.5mg every other day  Cont glucosamine-chondroitin, turmeric  Tdap ordered  Standing lupus labs q 3 months, plan stop prednisone is labs satisfactory in 3 months  RTC 6 months

## 2019-08-18 ENCOUNTER — TELEPHONE (OUTPATIENT)
Dept: RHEUMATOLOGY | Facility: CLINIC | Age: 54
End: 2019-08-18

## 2019-08-18 NOTE — TELEPHONE ENCOUNTER
Vipin, please complete your note on MsHaydee Nunez so I can close the encounter. Thanks so much. MINA

## 2019-09-10 ENCOUNTER — PATIENT MESSAGE (OUTPATIENT)
Dept: RHEUMATOLOGY | Facility: CLINIC | Age: 54
End: 2019-09-10

## 2019-09-10 ENCOUNTER — TELEPHONE (OUTPATIENT)
Dept: RHEUMATOLOGY | Facility: CLINIC | Age: 54
End: 2019-09-10

## 2019-09-10 ENCOUNTER — LAB VISIT (OUTPATIENT)
Dept: LAB | Facility: HOSPITAL | Age: 54
End: 2019-09-10
Attending: INTERNAL MEDICINE
Payer: COMMERCIAL

## 2019-09-10 DIAGNOSIS — Z79.624 ENCOUNTER FOR LONG TERM CURRENT USE OF AZATHIOPRINE: ICD-10-CM

## 2019-09-10 DIAGNOSIS — M32.9 SLE (SYSTEMIC LUPUS ERYTHEMATOSUS): ICD-10-CM

## 2019-09-10 LAB
BASOPHILS # BLD AUTO: 0.04 K/UL (ref 0–0.2)
BASOPHILS NFR BLD: 1 % (ref 0–1.9)
CRP SERPL-MCNC: 0.2 MG/L (ref 0–8.2)
DIFFERENTIAL METHOD: ABNORMAL
EOSINOPHIL # BLD AUTO: 0 K/UL (ref 0–0.5)
EOSINOPHIL NFR BLD: 0.5 % (ref 0–8)
ERYTHROCYTE [DISTWIDTH] IN BLOOD BY AUTOMATED COUNT: 12.4 % (ref 11.5–14.5)
ERYTHROCYTE [SEDIMENTATION RATE] IN BLOOD BY WESTERGREN METHOD: <2 MM/HR (ref 0–36)
HCT VFR BLD AUTO: 43.5 % (ref 37–48.5)
HGB BLD-MCNC: 14 G/DL (ref 12–16)
IMM GRANULOCYTES # BLD AUTO: 0.02 K/UL (ref 0–0.04)
IMM GRANULOCYTES NFR BLD AUTO: 0.5 % (ref 0–0.5)
LYMPHOCYTES # BLD AUTO: 0.6 K/UL (ref 1–4.8)
LYMPHOCYTES NFR BLD: 14.4 % (ref 18–48)
MCH RBC QN AUTO: 32.3 PG (ref 27–31)
MCHC RBC AUTO-ENTMCNC: 32.2 G/DL (ref 32–36)
MCV RBC AUTO: 101 FL (ref 82–98)
MONOCYTES # BLD AUTO: 0.4 K/UL (ref 0.3–1)
MONOCYTES NFR BLD: 10.7 % (ref 4–15)
NEUTROPHILS # BLD AUTO: 3 K/UL (ref 1.8–7.7)
NEUTROPHILS NFR BLD: 72.9 % (ref 38–73)
NRBC BLD-RTO: 0 /100 WBC
PLATELET # BLD AUTO: 212 K/UL (ref 150–350)
PMV BLD AUTO: 10.6 FL (ref 9.2–12.9)
RBC # BLD AUTO: 4.33 M/UL (ref 4–5.4)
WBC # BLD AUTO: 4.11 K/UL (ref 3.9–12.7)

## 2019-09-10 PROCEDURE — 36415 COLL VENOUS BLD VENIPUNCTURE: CPT | Mod: PO

## 2019-09-10 PROCEDURE — 85025 COMPLETE CBC W/AUTO DIFF WBC: CPT

## 2019-09-10 PROCEDURE — 86140 C-REACTIVE PROTEIN: CPT

## 2019-09-10 PROCEDURE — 85652 RBC SED RATE AUTOMATED: CPT

## 2019-09-11 ENCOUNTER — PATIENT MESSAGE (OUTPATIENT)
Dept: RHEUMATOLOGY | Facility: CLINIC | Age: 54
End: 2019-09-11

## 2019-09-16 RX ORDER — PREDNISONE 5 MG/1
TABLET ORAL
Qty: 50 TABLET | Refills: 0 | Status: SHIPPED | OUTPATIENT
Start: 2019-09-16 | End: 2020-01-06

## 2019-09-26 NOTE — PATIENT INSTRUCTIONS
- Fasting glucose pending  - Started on 50% of home dose yesterday, tolerating well  - Refusing occasional insulin checks  - Restart home insulin at discharge  - Restart metformin at discharge   Please get tennis elbow brace for your L elbow at your local pharmacy  We will repeat your blood work next week  Urinalysis today  Please get tetanus at next visit

## 2019-10-24 ENCOUNTER — PATIENT MESSAGE (OUTPATIENT)
Dept: RHEUMATOLOGY | Facility: CLINIC | Age: 54
End: 2019-10-24

## 2019-11-03 RX ORDER — PREDNISONE 5 MG/1
TABLET ORAL
Qty: 45 TABLET | Refills: 1 | Status: SHIPPED | OUTPATIENT
Start: 2019-11-03 | End: 2019-11-05 | Stop reason: SDUPTHER

## 2019-11-04 ENCOUNTER — PATIENT MESSAGE (OUTPATIENT)
Dept: RHEUMATOLOGY | Facility: CLINIC | Age: 54
End: 2019-11-04

## 2019-11-05 RX ORDER — PREDNISONE 5 MG/1
TABLET ORAL
Qty: 45 TABLET | Refills: 1 | Status: SHIPPED | OUTPATIENT
Start: 2019-11-05 | End: 2020-06-29 | Stop reason: SDUPTHER

## 2019-11-15 ENCOUNTER — LAB VISIT (OUTPATIENT)
Dept: LAB | Facility: HOSPITAL | Age: 54
End: 2019-11-15
Attending: INTERNAL MEDICINE
Payer: COMMERCIAL

## 2019-11-15 DIAGNOSIS — M32.9 SLE (SYSTEMIC LUPUS ERYTHEMATOSUS): ICD-10-CM

## 2019-11-15 LAB
ALBUMIN SERPL BCP-MCNC: 3.8 G/DL (ref 3.5–5.2)
ALP SERPL-CCNC: 52 U/L (ref 55–135)
ALT SERPL W/O P-5'-P-CCNC: 21 U/L (ref 10–44)
ANION GAP SERPL CALC-SCNC: 9 MMOL/L (ref 8–16)
AST SERPL-CCNC: 22 U/L (ref 10–40)
BILIRUB SERPL-MCNC: 0.7 MG/DL (ref 0.1–1)
BUN SERPL-MCNC: 10 MG/DL (ref 6–20)
CALCIUM SERPL-MCNC: 8.9 MG/DL (ref 8.7–10.5)
CHLORIDE SERPL-SCNC: 102 MMOL/L (ref 95–110)
CO2 SERPL-SCNC: 27 MMOL/L (ref 23–29)
CREAT SERPL-MCNC: 0.8 MG/DL (ref 0.5–1.4)
CRP SERPL-MCNC: 2.3 MG/L (ref 0–8.2)
EST. GFR  (AFRICAN AMERICAN): >60 ML/MIN/1.73 M^2
EST. GFR  (NON AFRICAN AMERICAN): >60 ML/MIN/1.73 M^2
GLUCOSE SERPL-MCNC: 81 MG/DL (ref 70–110)
POTASSIUM SERPL-SCNC: 3.8 MMOL/L (ref 3.5–5.1)
PROT SERPL-MCNC: 7.1 G/DL (ref 6–8.4)
SODIUM SERPL-SCNC: 138 MMOL/L (ref 136–145)

## 2019-11-15 PROCEDURE — 86140 C-REACTIVE PROTEIN: CPT

## 2019-11-15 PROCEDURE — 85652 RBC SED RATE AUTOMATED: CPT

## 2019-11-15 PROCEDURE — 86225 DNA ANTIBODY NATIVE: CPT

## 2019-11-15 PROCEDURE — 36415 COLL VENOUS BLD VENIPUNCTURE: CPT | Mod: PO

## 2019-11-15 PROCEDURE — 86160 COMPLEMENT ANTIGEN: CPT

## 2019-11-15 PROCEDURE — 80053 COMPREHEN METABOLIC PANEL: CPT

## 2019-11-15 PROCEDURE — 86160 COMPLEMENT ANTIGEN: CPT | Mod: 59

## 2019-11-16 LAB
C3 SERPL-MCNC: 108 MG/DL (ref 50–180)
C4 SERPL-MCNC: 13 MG/DL (ref 11–44)
ERYTHROCYTE [SEDIMENTATION RATE] IN BLOOD BY WESTERGREN METHOD: 5 MM/HR (ref 0–36)

## 2019-11-18 LAB — DSDNA AB SER-ACNC: NORMAL [IU]/ML

## 2019-12-12 ENCOUNTER — TELEPHONE (OUTPATIENT)
Dept: OBSTETRICS AND GYNECOLOGY | Facility: CLINIC | Age: 54
End: 2019-12-12

## 2019-12-12 NOTE — TELEPHONE ENCOUNTER
Returned call to patient.  She requested a sooner appt for spotting, denies bleeding.  Only wants to see DR. Smith at either location.  Appt 01/06/19 at 10:45 am, she confirmed appt and location.

## 2019-12-12 NOTE — TELEPHONE ENCOUNTER
----- Message from Rina Cameron sent at 12/12/2019  3:24 PM CST -----  Contact: pt   She's calling in regards sooner appt       pls call pt back at [phone] (home) [phone] (work)

## 2020-01-06 ENCOUNTER — OFFICE VISIT (OUTPATIENT)
Dept: OBSTETRICS AND GYNECOLOGY | Facility: CLINIC | Age: 55
End: 2020-01-06
Payer: COMMERCIAL

## 2020-01-06 VITALS
BODY MASS INDEX: 23.47 KG/M2 | HEIGHT: 65 IN | SYSTOLIC BLOOD PRESSURE: 106 MMHG | DIASTOLIC BLOOD PRESSURE: 72 MMHG | WEIGHT: 140.88 LBS

## 2020-01-06 DIAGNOSIS — N95.0 PMB (POSTMENOPAUSAL BLEEDING): Primary | ICD-10-CM

## 2020-01-06 PROCEDURE — 17250 CHEM CAUT OF GRANLTJ TISSUE: CPT | Mod: S$GLB,,, | Performed by: OBSTETRICS & GYNECOLOGY

## 2020-01-06 PROCEDURE — 3008F BODY MASS INDEX DOCD: CPT | Mod: CPTII,S$GLB,, | Performed by: OBSTETRICS & GYNECOLOGY

## 2020-01-06 PROCEDURE — 3008F PR BODY MASS INDEX (BMI) DOCUMENTED: ICD-10-PCS | Mod: CPTII,S$GLB,, | Performed by: OBSTETRICS & GYNECOLOGY

## 2020-01-06 PROCEDURE — 99213 PR OFFICE/OUTPT VISIT, EST, LEVL III, 20-29 MIN: ICD-10-PCS | Mod: 25,S$GLB,, | Performed by: OBSTETRICS & GYNECOLOGY

## 2020-01-06 PROCEDURE — 17250 PR CHEM CAUTERY GRANULATN TISSUE: ICD-10-PCS | Mod: S$GLB,,, | Performed by: OBSTETRICS & GYNECOLOGY

## 2020-01-06 PROCEDURE — 99213 OFFICE O/P EST LOW 20 MIN: CPT | Mod: 25,S$GLB,, | Performed by: OBSTETRICS & GYNECOLOGY

## 2020-01-06 PROCEDURE — 99999 PR PBB SHADOW E&M-EST. PATIENT-LVL III: CPT | Mod: PBBFAC,,, | Performed by: OBSTETRICS & GYNECOLOGY

## 2020-01-06 PROCEDURE — 99999 PR PBB SHADOW E&M-EST. PATIENT-LVL III: ICD-10-PCS | Mod: PBBFAC,,, | Performed by: OBSTETRICS & GYNECOLOGY

## 2020-01-06 RX ORDER — MECLIZINE HYDROCHLORIDE 25 MG/1
25 TABLET ORAL
COMMUNITY
Start: 2019-03-05 | End: 2022-07-20

## 2020-01-06 RX ORDER — ONDANSETRON 4 MG/1
TABLET, FILM COATED ORAL
COMMUNITY
Start: 2019-11-13 | End: 2020-03-05

## 2020-01-06 RX ORDER — NITROFURANTOIN MACROCRYSTALS 50 MG/1
50 CAPSULE ORAL
COMMUNITY
Start: 2019-11-03 | End: 2020-03-05

## 2020-01-06 RX ORDER — LORAZEPAM 1 MG/1
1 TABLET ORAL EVERY 8 HOURS PRN
COMMUNITY
Start: 2019-04-30 | End: 2020-03-05

## 2020-01-06 NOTE — PROGRESS NOTES
"CHIEF COMPLAINT:   Chief Complaint   Patient presents with    Vaginal Bleeding     c/o vaginal spotting x 2 months, "off and on, started in September"       HISTORY OF PRESENT ILLNESS    Madeleine Nunez 54 y.o.  complains of: spotting. This occurred for 2wks, last occurerd about 3wks ago. Sexually active but this does not correlate in timing. /  This is same as she had in the past which led to a colposcopy to evaluate an area of the cervix that appeared to be, and was confirmed to be, granulation tissue. It was treated with silver nitrate.   decl std scr.     Patient has no recent unexplained weight loss, bloating, appetite loss, or bowel movement pattern change.          2016  SPECIMEN  1) Cervix, 6 o'clock.  2) Cervix, 7 o'clock.  3) Endocervical curettage.  FINAL PATHOLOGIC DIAGNOSIS  1. CERVIX, 6 O'CLOCK, BIOPSY:  Minute fragment of granulation tissue  No evidence of malignancy  2. CERVIX, 7 O'CLOCK, BIOPSY:  Granulation tissue  No evidence of malignancy  3. ENDOCERVICAL CURRETAGE:  Mucus only  No epithelium present for evaluation    HISTORY  Patient Active Problem List   Diagnosis    SLE (systemic lupus erythematosus)    Kidney stones    Sicca syndrome    Rosacea    Meniere's disease of right ear    Hypothyroidism    History of long-term treatment with high-risk medication    Leblanc's neuroma of right foot    Hypoglycemia    Plantar fasciitis of right foot    Pyuria, sterile    Bilateral mastodynia    Granulation tissue of cervix    Shortness of breath    Left lumbar radiculopathy    Lateral epicondylitis of right elbow       Past Medical History:   Diagnosis Date    Allergy     Anemia     Blood transfusion     Dry eyes     Hypothyroidism 10/9/2012    Joint pain     Kidney stones 10/9/2012    Lupus     Meniere disease     Leblanc's neuroma of right foot 2014    Osteoarthritis of left knee 2018    Substance abuse     Systemic lupus erythematosus        Past " Surgical History:   Procedure Laterality Date    CHOLECYSTECTOMY         Family History   Problem Relation Age of Onset    Lupus Sister     Thyroid cancer Sister     Glaucoma Sister     Melanoma Father     Thyroid cancer Father     Diabetes Father     Cataracts Father     Diabetes Mother     Glaucoma Mother     Cataracts Mother     Hypertension Brother     Kidney failure Sister     Lupus Sister     Glaucoma Sister     Breast cancer Paternal Grandmother     Kidney disease Neg Hx     Psoriasis Neg Hx     Eczema Neg Hx     Acne Neg Hx     Colon cancer Neg Hx     Ovarian cancer Neg Hx     Thrombophilia Neg Hx     Thrombosis Neg Hx        Social History     Socioeconomic History    Marital status:      Spouse name: Not on file    Number of children: Not on file    Years of education: Not on file    Highest education level: Not on file   Occupational History     Employer: [employer]   Social Needs    Financial resource strain: Not on file    Food insecurity:     Worry: Not on file     Inability: Not on file    Transportation needs:     Medical: Not on file     Non-medical: Not on file   Tobacco Use    Smoking status: Never Smoker    Smokeless tobacco: Never Used   Substance and Sexual Activity    Alcohol use: Yes     Comment: 1 drink per week    Drug use: No    Sexual activity: Yes     Partners: Male     Birth control/protection: Post-menopausal     Comment: mut monog   Lifestyle    Physical activity:     Days per week: Not on file     Minutes per session: Not on file    Stress: Not on file   Relationships    Social connections:     Talks on phone: Not on file     Gets together: Not on file     Attends Mu-ism service: Not on file     Active member of club or organization: Not on file     Attends meetings of clubs or organizations: Not on file     Relationship status: Not on file   Other Topics Concern    Are you pregnant or think you may be? No    Breast-feeding No  "  Social History Narrative    Not on file       Current Outpatient Medications   Medication Sig Dispense Refill    azaTHIOprine (IMURAN) 50 mg Tab Take 2 tablets (100 mg total) by mouth once daily. 180 tablet 0    betahistine HCl (BETAHISTINE, BULK, MISC) 12 mg by Misc.(Non-Drug; Combo Route) route 2 (two) times daily.      Lactobacillus rhamnosus GG (CULTURELLE) 10 billion cell capsule Take 1 capsule by mouth.      levothyroxine (SYNTHROID) 75 MCG tablet Take 75 mcg by mouth.      LORazepam (ATIVAN) 1 MG tablet Take 1 mg by mouth every 8 (eight) hours as needed.      meclizine (ANTIVERT) 25 mg tablet Take 25 mg by mouth.      nitrofurantoin (MACRODANTIN) 50 MG capsule Take 50 mg by mouth.      ondansetron (ZOFRAN) 4 MG tablet       predniSONE (DELTASONE) 5 MG tablet TAKE 1/2 TABLET BY MOUTH EVERY OTHER DAY 45 tablet 1    progesterone (PROMETRIUM) 200 MG capsule TAKE 1 CAPSULE BY MOUTH EVERY EVENING FROM 7-9PM  6    spironolactone (ALDACTONE) 50 MG tablet TK 1 T PO QD  5     No current facility-administered medications for this visit.        Review of patient's allergies indicates:   Allergen Reactions    Sulfa (sulfonamide antibiotics)      Other reaction(s): Flushing (skin)    Sulfamethoxazole-trimethoprim      Other reaction(s): Flushing (skin)           PHYSICAL EXAM     Vitals:    01/06/20 1056   BP: 106/72   Weight: 63.9 kg (140 lb 14 oz)   Height: 5' 5" (1.651 m)   PainSc: 0-No pain        PAIN SCALE: 0/10 None    ROS:  GENERAL: No fever, chills, fatigability or weight loss.  ABDOMEN: Appetite fine. No weight loss. Denies diarrhea, abdominal pain, hematemesis or blood in stool.  URINARY: No flank pain, dysuria or hematuria.  BREASTS: Breasts symmetric, nontender and no lumps detected.     PE:   APPEARANCE: Well nourished, well developed, in no acute distress.  ABDOMEN: Soft. No tenderness or masses.  No hernias.      PELVIC:   VULVA: No lesions. Normal female genitalia.  URETHRAL MEATUS: Normal " size and location, no lesions, no prolapse.  URETHRA: No masses, tenderness, prolapse or scarring.  VAGINA: Moist and well rugated, no discharge, no significant cystocele or rectocele.  CERVIX: No lesions, small diameter with posterior lip agglutinated to the posterior vaginal cuff but along this interface there was a 2x1mm area of granulation, + stenosis, no cervical motion tenderness.         DIAGNOSIS:   1. PMB/spotting  2. Granulation tissue      procedure  Silver nitrate applied, pt tolerated this well.         Plan still to repeat pelvic u/s.   FOLLOW-UP: With me for f/u of silver nitrate application and for ul/s results.

## 2020-01-13 ENCOUNTER — TELEPHONE (OUTPATIENT)
Dept: RADIOLOGY | Facility: HOSPITAL | Age: 55
End: 2020-01-13

## 2020-01-14 ENCOUNTER — HOSPITAL ENCOUNTER (OUTPATIENT)
Dept: RADIOLOGY | Facility: HOSPITAL | Age: 55
Discharge: HOME | End: 2020-01-14
Attending: OBSTETRICS & GYNECOLOGY
Payer: COMMERCIAL

## 2020-01-14 DIAGNOSIS — N95.0 PMB (POSTMENOPAUSAL BLEEDING): ICD-10-CM

## 2020-01-14 PROCEDURE — 76830 TRANSVAGINAL US NON-OB: CPT | Mod: TC

## 2020-01-14 PROCEDURE — 76830 US PELVIS COMP WITH TRANSVAG NON-OB (XPD): ICD-10-PCS | Mod: 26,,, | Performed by: RADIOLOGY

## 2020-01-14 PROCEDURE — 76856 US EXAM PELVIC COMPLETE: CPT | Mod: 26,,, | Performed by: RADIOLOGY

## 2020-01-14 PROCEDURE — 76830 TRANSVAGINAL US NON-OB: CPT | Mod: 26,,, | Performed by: RADIOLOGY

## 2020-01-14 PROCEDURE — 76856 US PELVIS COMP WITH TRANSVAG NON-OB (XPD): ICD-10-PCS | Mod: 26,,, | Performed by: RADIOLOGY

## 2020-01-17 NOTE — TELEPHONE ENCOUNTER
Henrik, would you mind refilling Astelin which you had previously prescribed for this patient? Thanks so much. MINA   done

## 2020-03-02 ENCOUNTER — LAB VISIT (OUTPATIENT)
Dept: LAB | Facility: HOSPITAL | Age: 55
End: 2020-03-02
Attending: INTERNAL MEDICINE
Payer: COMMERCIAL

## 2020-03-02 DIAGNOSIS — M32.9 SLE (SYSTEMIC LUPUS ERYTHEMATOSUS): ICD-10-CM

## 2020-03-02 LAB — ERYTHROCYTE [SEDIMENTATION RATE] IN BLOOD BY WESTERGREN METHOD: 6 MM/HR (ref 0–36)

## 2020-03-02 PROCEDURE — 86160 COMPLEMENT ANTIGEN: CPT

## 2020-03-02 PROCEDURE — 86140 C-REACTIVE PROTEIN: CPT

## 2020-03-02 PROCEDURE — 36415 COLL VENOUS BLD VENIPUNCTURE: CPT | Mod: PO

## 2020-03-02 PROCEDURE — 86160 COMPLEMENT ANTIGEN: CPT | Mod: 59

## 2020-03-02 PROCEDURE — 86225 DNA ANTIBODY NATIVE: CPT

## 2020-03-02 PROCEDURE — 80053 COMPREHEN METABOLIC PANEL: CPT

## 2020-03-02 PROCEDURE — 85652 RBC SED RATE AUTOMATED: CPT

## 2020-03-03 ENCOUNTER — TELEPHONE (OUTPATIENT)
Dept: RHEUMATOLOGY | Facility: CLINIC | Age: 55
End: 2020-03-03

## 2020-03-03 DIAGNOSIS — E83.51 HYPOCALCEMIA: Primary | ICD-10-CM

## 2020-03-03 LAB
ALBUMIN SERPL BCP-MCNC: 3.7 G/DL (ref 3.5–5.2)
ALP SERPL-CCNC: 58 U/L (ref 55–135)
ALT SERPL W/O P-5'-P-CCNC: 12 U/L (ref 10–44)
ANION GAP SERPL CALC-SCNC: 5 MMOL/L (ref 8–16)
AST SERPL-CCNC: 21 U/L (ref 10–40)
BILIRUB SERPL-MCNC: 0.7 MG/DL (ref 0.1–1)
BUN SERPL-MCNC: 14 MG/DL (ref 6–20)
C3 SERPL-MCNC: 78 MG/DL (ref 50–180)
C4 SERPL-MCNC: 10 MG/DL (ref 11–44)
CALCIUM SERPL-MCNC: 8.3 MG/DL (ref 8.7–10.5)
CHLORIDE SERPL-SCNC: 103 MMOL/L (ref 95–110)
CO2 SERPL-SCNC: 28 MMOL/L (ref 23–29)
CREAT SERPL-MCNC: 0.8 MG/DL (ref 0.5–1.4)
CRP SERPL-MCNC: 0.2 MG/L (ref 0–8.2)
EST. GFR  (AFRICAN AMERICAN): >60 ML/MIN/1.73 M^2
EST. GFR  (NON AFRICAN AMERICAN): >60 ML/MIN/1.73 M^2
GLUCOSE SERPL-MCNC: 69 MG/DL (ref 70–110)
POTASSIUM SERPL-SCNC: 3.9 MMOL/L (ref 3.5–5.1)
PROT SERPL-MCNC: 6.8 G/DL (ref 6–8.4)
SODIUM SERPL-SCNC: 136 MMOL/L (ref 136–145)

## 2020-03-04 ENCOUNTER — PATIENT MESSAGE (OUTPATIENT)
Dept: RHEUMATOLOGY | Facility: CLINIC | Age: 55
End: 2020-03-04

## 2020-03-05 ENCOUNTER — LAB VISIT (OUTPATIENT)
Dept: LAB | Facility: HOSPITAL | Age: 55
End: 2020-03-05
Attending: INTERNAL MEDICINE
Payer: COMMERCIAL

## 2020-03-05 ENCOUNTER — TELEPHONE (OUTPATIENT)
Dept: RHEUMATOLOGY | Facility: CLINIC | Age: 55
End: 2020-03-05

## 2020-03-05 ENCOUNTER — CLINICAL SUPPORT (OUTPATIENT)
Dept: INFECTIOUS DISEASES | Facility: CLINIC | Age: 55
End: 2020-03-05
Payer: COMMERCIAL

## 2020-03-05 ENCOUNTER — OFFICE VISIT (OUTPATIENT)
Dept: RHEUMATOLOGY | Facility: CLINIC | Age: 55
End: 2020-03-05
Payer: COMMERCIAL

## 2020-03-05 VITALS
HEIGHT: 65 IN | DIASTOLIC BLOOD PRESSURE: 70 MMHG | BODY MASS INDEX: 22.16 KG/M2 | SYSTOLIC BLOOD PRESSURE: 103 MMHG | HEART RATE: 70 BPM | WEIGHT: 133 LBS

## 2020-03-05 DIAGNOSIS — E83.51 HYPOCALCEMIA: Primary | ICD-10-CM

## 2020-03-05 DIAGNOSIS — M19.042 OA (OSTEOARTHRITIS) OF FINGER, LEFT: ICD-10-CM

## 2020-03-05 DIAGNOSIS — M32.9 SYSTEMIC LUPUS ERYTHEMATOSUS, UNSPECIFIED SLE TYPE, UNSPECIFIED ORGAN INVOLVEMENT STATUS: Primary | ICD-10-CM

## 2020-03-05 DIAGNOSIS — E83.51 HYPOCALCEMIA: ICD-10-CM

## 2020-03-05 DIAGNOSIS — M32.9 SLE (SYSTEMIC LUPUS ERYTHEMATOSUS): ICD-10-CM

## 2020-03-05 DIAGNOSIS — M32.9 SYSTEMIC LUPUS ERYTHEMATOSUS, UNSPECIFIED SLE TYPE, UNSPECIFIED ORGAN INVOLVEMENT STATUS: ICD-10-CM

## 2020-03-05 LAB
25(OH)D3+25(OH)D2 SERPL-MCNC: 23 NG/ML (ref 30–96)
BASOPHILS # BLD AUTO: 0.03 K/UL (ref 0–0.2)
BASOPHILS NFR BLD: 0.7 % (ref 0–1.9)
C3 SERPL-MCNC: 90 MG/DL (ref 50–180)
C4 SERPL-MCNC: 11 MG/DL (ref 11–44)
CA-I BLDV-SCNC: 1.06 MMOL/L (ref 1.06–1.42)
CALCIUM SERPL-MCNC: 9 MG/DL (ref 8.7–10.5)
DIFFERENTIAL METHOD: ABNORMAL
DSDNA AB SER-ACNC: NORMAL [IU]/ML
EOSINOPHIL # BLD AUTO: 0 K/UL (ref 0–0.5)
EOSINOPHIL NFR BLD: 0.7 % (ref 0–8)
ERYTHROCYTE [DISTWIDTH] IN BLOOD BY AUTOMATED COUNT: 12.2 % (ref 11.5–14.5)
HCT VFR BLD AUTO: 44.6 % (ref 37–48.5)
HGB BLD-MCNC: 15.4 G/DL (ref 12–16)
IMM GRANULOCYTES # BLD AUTO: 0.02 K/UL (ref 0–0.04)
IMM GRANULOCYTES NFR BLD AUTO: 0.5 % (ref 0–0.5)
LYMPHOCYTES # BLD AUTO: 0.7 K/UL (ref 1–4.8)
LYMPHOCYTES NFR BLD: 17.3 % (ref 18–48)
MAGNESIUM SERPL-MCNC: 2 MG/DL (ref 1.6–2.6)
MCH RBC QN AUTO: 34.5 PG (ref 27–31)
MCHC RBC AUTO-ENTMCNC: 34.5 G/DL (ref 32–36)
MCV RBC AUTO: 100 FL (ref 82–98)
MONOCYTES # BLD AUTO: 0.5 K/UL (ref 0.3–1)
MONOCYTES NFR BLD: 13.3 % (ref 4–15)
NEUTROPHILS # BLD AUTO: 2.7 K/UL (ref 1.8–7.7)
NEUTROPHILS NFR BLD: 67.5 % (ref 38–73)
NRBC BLD-RTO: 0 /100 WBC
PHOSPHATE SERPL-MCNC: 2.7 MG/DL (ref 2.7–4.5)
PLATELET # BLD AUTO: 247 K/UL (ref 150–350)
PMV BLD AUTO: 10.1 FL (ref 9.2–12.9)
PTH-INTACT SERPL-MCNC: 73 PG/ML (ref 9–77)
RBC # BLD AUTO: 4.47 M/UL (ref 4–5.4)
WBC # BLD AUTO: 4.05 K/UL (ref 3.9–12.7)

## 2020-03-05 PROCEDURE — 99214 OFFICE O/P EST MOD 30 MIN: CPT | Mod: S$GLB,,, | Performed by: INTERNAL MEDICINE

## 2020-03-05 PROCEDURE — 90750 HZV VACC RECOMBINANT IM: CPT | Mod: S$GLB,,, | Performed by: INTERNAL MEDICINE

## 2020-03-05 PROCEDURE — 90471 IMMUNIZATION ADMIN: CPT | Mod: S$GLB,,, | Performed by: INTERNAL MEDICINE

## 2020-03-05 PROCEDURE — 85025 COMPLETE CBC W/AUTO DIFF WBC: CPT

## 2020-03-05 PROCEDURE — 82310 ASSAY OF CALCIUM: CPT

## 2020-03-05 PROCEDURE — 3008F BODY MASS INDEX DOCD: CPT | Mod: CPTII,S$GLB,, | Performed by: INTERNAL MEDICINE

## 2020-03-05 PROCEDURE — 82330 ASSAY OF CALCIUM: CPT

## 2020-03-05 PROCEDURE — 86160 COMPLEMENT ANTIGEN: CPT

## 2020-03-05 PROCEDURE — 3008F PR BODY MASS INDEX (BMI) DOCUMENTED: ICD-10-PCS | Mod: CPTII,S$GLB,, | Performed by: INTERNAL MEDICINE

## 2020-03-05 PROCEDURE — 84100 ASSAY OF PHOSPHORUS: CPT

## 2020-03-05 PROCEDURE — 99999 PR PBB SHADOW E&M-EST. PATIENT-LVL III: CPT | Mod: PBBFAC,,, | Performed by: INTERNAL MEDICINE

## 2020-03-05 PROCEDURE — 86160 COMPLEMENT ANTIGEN: CPT | Mod: 59

## 2020-03-05 PROCEDURE — 82306 VITAMIN D 25 HYDROXY: CPT

## 2020-03-05 PROCEDURE — 83735 ASSAY OF MAGNESIUM: CPT

## 2020-03-05 PROCEDURE — 36415 COLL VENOUS BLD VENIPUNCTURE: CPT

## 2020-03-05 PROCEDURE — 86225 DNA ANTIBODY NATIVE: CPT

## 2020-03-05 PROCEDURE — 90471 ZOSTER RECOMBINANT VACCINE: ICD-10-PCS | Mod: S$GLB,,, | Performed by: INTERNAL MEDICINE

## 2020-03-05 PROCEDURE — 99214 PR OFFICE/OUTPT VISIT, EST, LEVL IV, 30-39 MIN: ICD-10-PCS | Mod: S$GLB,,, | Performed by: INTERNAL MEDICINE

## 2020-03-05 PROCEDURE — 83970 ASSAY OF PARATHORMONE: CPT

## 2020-03-05 PROCEDURE — 90750 ZOSTER RECOMBINANT VACCINE: ICD-10-PCS | Mod: S$GLB,,, | Performed by: INTERNAL MEDICINE

## 2020-03-05 PROCEDURE — 99999 PR PBB SHADOW E&M-EST. PATIENT-LVL III: ICD-10-PCS | Mod: PBBFAC,,, | Performed by: INTERNAL MEDICINE

## 2020-03-05 RX ORDER — DICLOFENAC SODIUM 10 MG/G
4 GEL TOPICAL 4 TIMES DAILY
Status: DISCONTINUED | OUTPATIENT
Start: 2020-03-05 | End: 2020-03-05

## 2020-03-05 RX ORDER — DICLOFENAC SODIUM 30 MG/G
GEL TOPICAL
Qty: 300 G | Refills: 3 | Status: SHIPPED | OUTPATIENT
Start: 2020-03-05 | End: 2022-07-20

## 2020-03-05 ASSESSMENT — ROUTINE ASSESSMENT OF PATIENT INDEX DATA (RAPID3)
PSYCHOLOGICAL DISTRESS SCORE: 0
MDHAQ FUNCTION SCORE: 0
AM STIFFNESS SCORE: 0, NO
PATIENT GLOBAL ASSESSMENT SCORE: 0
TOTAL RAPID3 SCORE: 0
PAIN SCORE: 0
FATIGUE SCORE: 0

## 2020-03-05 ASSESSMENT — SYSTEMIC LUPUS ERYTHEMATOSUS DISEASE ACTIVITY INDEX (SLEDAI): TOTAL_SCORE: 2

## 2020-03-05 NOTE — PROGRESS NOTES
I have personally taken the history and examined the patient and agree with the resident,s note as stated above     SLE: SLEDAI= 2(low C4) pending CBC, UA  Moderate lymphopenia likely related to azathioprine  hypocalcemia  Recurrent UTIs, now on macrodantin 50mg hs prophylaxis      *Tdap with RTC 3 months  *Shingrix series #1 today, #2 with RTC 3 months  CBC, lipid panel, Vitamin D, iPTH, Mg PO4,ionized calcium, calcium, calcitriol  UA, UPCR  Azathioprine 100 mg daily   prednisone 2.5mg every day  Cont glucosamine-chondroitin, turmeric  Hold azathioprine on May trip  1200 mg elemental dietary calcium, reviewed and provided handout  Standing lupus labs q 3 months, plan taper off prednisone if labs satisfactory in 3 months  RTC 3 months with standing lupus labs.  Answers for HPI/ROS submitted by the patient on 3/4/2020   fever: No  eye redness: No  headaches: No  shortness of breath: No  chest pain: No  trouble swallowing: No  diarrhea: No  constipation: No  unexpected weight change: No  genital sore: No  dysuria: No  During the last 3 days, have you had a skin rash?: No  Bruises or bleeds easily: No  cough: No

## 2020-03-05 NOTE — PROGRESS NOTES
"Subjective:       Patient ID: Hubert Rodriguez is a 55 y.o. female.    Chief Complaint: SLE  HPI   Last clinic visit was 8/16/29. Patient returns today for follow-up. Previously leukopenia secondary to azathioprine therapy.     Recurrent UTIs history. Takes macrodantin 50mg qhs for prophylaxis. Painless hematuria. Followed by Dr. Ram, urology, in [place]. CT, labs, cystoscopy were all negative.     She says that she has been taking prednisone 2.5mg daily and Imuran 100mg daily and tolerating it well. In regards to her SLE, she has no complaints. She has not had any flares and has continued exercising 6-7 days per week and feeling well.  Otherwise, no complaints.     In the interim her calcium was found to be low at 8.3. She is not taking any calcium or vitamin D supplementation. Additionally her complement level was low.     The liver and kidney function are normal. The sed rate and crp inflammation tests are normal.      Review of Systems   Constitutional: Negative for fever and unexpected weight change.   HENT: Negative for trouble swallowing.    Eyes: Negative for redness.   Respiratory: Negative for cough and shortness of breath.    Cardiovascular: Negative for chest pain.   Gastrointestinal: Negative for constipation and diarrhea.   Genitourinary: Negative for dysuria and genital sores.   Skin: Negative for rash.   Neurological: Negative for headaches.   Hematological: Does not bruise/bleed easily.         Objective:   Ht 5' 4.8" (1.646 m)   Wt 60.3 kg (133 lb)   LMP  (LMP Unknown)   BMI 22.27 kg/m²      Physical Exam           Results for HUBERT RODRIGUEZ (MRN [id number]) as of 3/5/2020 10:37   Ref. Range 3/2/2020 10:14   Sed Rate Latest Ref Range: 0 - 36 mm/Hr 6   Sodium Latest Ref Range: 136 - 145 mmol/L 136   Potassium Latest Ref Range: 3.5 - 5.1 mmol/L 3.9   Chloride Latest Ref Range: 95 - 110 mmol/L 103   CO2 Latest Ref Range: 23 - 29 mmol/L 28   Anion Gap Latest Ref Range: 8 - 16 mmol/L 5 (L) "   BUN, Bld Latest Ref Range: 6 - 20 mg/dL 14   Creatinine Latest Ref Range: 0.5 - 1.4 mg/dL 0.8   eGFR if non African American Latest Ref Range: >60 mL/min/1.73 m^2 >60.0   eGFR if African American Latest Ref Range: >60 mL/min/1.73 m^2 >60.0   Glucose Latest Ref Range: 70 - 110 mg/dL 69 (L)   Calcium Latest Ref Range: 8.7 - 10.5 mg/dL 8.3 (L)   Alkaline Phosphatase Latest Ref Range: 55 - 135 U/L 58   PROTEIN TOTAL Latest Ref Range: 6.0 - 8.4 g/dL 6.8   Albumin Latest Ref Range: 3.5 - 5.2 g/dL 3.7   BILIRUBIN TOTAL Latest Ref Range: 0.1 - 1.0 mg/dL 0.7   AST Latest Ref Range: 10 - 40 U/L 21   ALT Latest Ref Range: 10 - 44 U/L 12   CRP Latest Ref Range: 0.0 - 8.2 mg/L 0.2   ds DNA Ab Latest Ref Range: Negative 1:10  Negative 1:10   Complement (C-3) Latest Ref Range: 50 - 180 mg/dL 78   Complement (C-4) Latest Ref Range: 11 - 44 mg/dL 10 (L)        Assessment:       1. Systemic lupus erythematosus, unspecified SLE type, unspecified organ involvement status     - SLE: SLEDAI= 2    - Sed rate 6, complement C3 78 and C4 10 (L)   - BUN Cr WNL   - Lipid panel, anti DS DNA, complement levels, CMP, CBC, sed rate, UA before next visit   - prednisone 2.5mg daily   - axothioprine 100 mg daily    2. Hypocalcemia    - serum level 8.3    - repeat with PTH, Vit D, Mg, Phos before next visit    - counseled on dietary sources of calcium intake      Plan:       Problem List Items Addressed This Visit        Immunology/Multi System    SLE (systemic lupus erythematosus) - Primary         *Tdap  *Shingrix series today  CBC, lipid panel, Vitamin D, iPTH, Mg PO4,ionized calcium, calcium  UA, UPCR  Azathioprine 100 mg daily   prednisone 2.5mg every day  Cont glucosamine-chondroitin, turmeric

## 2020-03-05 NOTE — PATIENT INSTRUCTIONS
- follow up blood work  - voltaren gel for finger joint  - increase dietary calcium  - hold azathioprine for vacation  - return to clinic in 3 months

## 2020-03-06 ENCOUNTER — PATIENT MESSAGE (OUTPATIENT)
Dept: RHEUMATOLOGY | Facility: CLINIC | Age: 55
End: 2020-03-06

## 2020-03-06 LAB — DSDNA AB SER-ACNC: NORMAL [IU]/ML

## 2020-03-12 DIAGNOSIS — M32.19 OTHER SYSTEMIC LUPUS ERYTHEMATOSUS WITH OTHER ORGAN INVOLVEMENT: ICD-10-CM

## 2020-03-12 RX ORDER — AZATHIOPRINE 50 MG/1
100 TABLET ORAL DAILY
Qty: 180 TABLET | Refills: 0 | Status: SHIPPED | OUTPATIENT
Start: 2020-03-12 | End: 2020-06-08 | Stop reason: SDUPTHER

## 2020-04-03 ENCOUNTER — PATIENT MESSAGE (OUTPATIENT)
Dept: RHEUMATOLOGY | Facility: CLINIC | Age: 55
End: 2020-04-03

## 2020-04-07 ENCOUNTER — PATIENT MESSAGE (OUTPATIENT)
Dept: RHEUMATOLOGY | Facility: CLINIC | Age: 55
End: 2020-04-07

## 2020-04-28 ENCOUNTER — PATIENT MESSAGE (OUTPATIENT)
Dept: RHEUMATOLOGY | Facility: CLINIC | Age: 55
End: 2020-04-28

## 2020-05-22 ENCOUNTER — PATIENT MESSAGE (OUTPATIENT)
Dept: RHEUMATOLOGY | Facility: CLINIC | Age: 55
End: 2020-05-22

## 2020-06-09 ENCOUNTER — TELEPHONE (OUTPATIENT)
Dept: RHEUMATOLOGY | Facility: CLINIC | Age: 55
End: 2020-06-09

## 2020-06-10 DIAGNOSIS — M32.19 OTHER SYSTEMIC LUPUS ERYTHEMATOSUS WITH OTHER ORGAN INVOLVEMENT: ICD-10-CM

## 2020-06-10 RX ORDER — AZATHIOPRINE 50 MG/1
100 TABLET ORAL DAILY
Qty: 60 TABLET | Refills: 0 | Status: SHIPPED | OUTPATIENT
Start: 2020-06-10 | End: 2020-07-06 | Stop reason: SDUPTHER

## 2020-06-11 ENCOUNTER — LAB VISIT (OUTPATIENT)
Dept: LAB | Facility: HOSPITAL | Age: 55
End: 2020-06-11
Attending: INTERNAL MEDICINE
Payer: COMMERCIAL

## 2020-06-11 DIAGNOSIS — M32.9 SYSTEMIC LUPUS ERYTHEMATOSUS, UNSPECIFIED SLE TYPE, UNSPECIFIED ORGAN INVOLVEMENT STATUS: ICD-10-CM

## 2020-06-11 LAB
ALBUMIN SERPL BCP-MCNC: 3.7 G/DL (ref 3.5–5.2)
ALP SERPL-CCNC: 52 U/L (ref 55–135)
ALT SERPL W/O P-5'-P-CCNC: 12 U/L (ref 10–44)
ANION GAP SERPL CALC-SCNC: 6 MMOL/L (ref 8–16)
AST SERPL-CCNC: 19 U/L (ref 10–40)
BASOPHILS # BLD AUTO: 0.05 K/UL (ref 0–0.2)
BASOPHILS NFR BLD: 0.8 % (ref 0–1.9)
BILIRUB SERPL-MCNC: 0.7 MG/DL (ref 0.1–1)
BUN SERPL-MCNC: 14 MG/DL (ref 6–20)
C3 SERPL-MCNC: 87 MG/DL (ref 50–180)
C4 SERPL-MCNC: 11 MG/DL (ref 11–44)
CALCIUM SERPL-MCNC: 8.7 MG/DL (ref 8.7–10.5)
CHLORIDE SERPL-SCNC: 105 MMOL/L (ref 95–110)
CO2 SERPL-SCNC: 28 MMOL/L (ref 23–29)
CREAT SERPL-MCNC: 0.8 MG/DL (ref 0.5–1.4)
CRP SERPL-MCNC: 0.3 MG/L (ref 0–8.2)
DIFFERENTIAL METHOD: ABNORMAL
EOSINOPHIL # BLD AUTO: 0.1 K/UL (ref 0–0.5)
EOSINOPHIL NFR BLD: 1.3 % (ref 0–8)
ERYTHROCYTE [DISTWIDTH] IN BLOOD BY AUTOMATED COUNT: 12.9 % (ref 11.5–14.5)
ERYTHROCYTE [SEDIMENTATION RATE] IN BLOOD BY WESTERGREN METHOD: 3 MM/HR (ref 0–36)
EST. GFR  (AFRICAN AMERICAN): >60 ML/MIN/1.73 M^2
EST. GFR  (NON AFRICAN AMERICAN): >60 ML/MIN/1.73 M^2
GLUCOSE SERPL-MCNC: 55 MG/DL (ref 70–110)
HCT VFR BLD AUTO: 42.1 % (ref 37–48.5)
HGB BLD-MCNC: 13.6 G/DL (ref 12–16)
IMM GRANULOCYTES # BLD AUTO: 0.02 K/UL (ref 0–0.04)
IMM GRANULOCYTES NFR BLD AUTO: 0.3 % (ref 0–0.5)
LYMPHOCYTES # BLD AUTO: 1 K/UL (ref 1–4.8)
LYMPHOCYTES NFR BLD: 16.1 % (ref 18–48)
MCH RBC QN AUTO: 33.3 PG (ref 27–31)
MCHC RBC AUTO-ENTMCNC: 32.3 G/DL (ref 32–36)
MCV RBC AUTO: 103 FL (ref 82–98)
MONOCYTES # BLD AUTO: 0.6 K/UL (ref 0.3–1)
MONOCYTES NFR BLD: 9.6 % (ref 4–15)
NEUTROPHILS # BLD AUTO: 4.3 K/UL (ref 1.8–7.7)
NEUTROPHILS NFR BLD: 71.9 % (ref 38–73)
NRBC BLD-RTO: 0 /100 WBC
PLATELET # BLD AUTO: 222 K/UL (ref 150–350)
PMV BLD AUTO: 10.3 FL (ref 9.2–12.9)
POTASSIUM SERPL-SCNC: 3.4 MMOL/L (ref 3.5–5.1)
PROT SERPL-MCNC: 6.8 G/DL (ref 6–8.4)
RBC # BLD AUTO: 4.09 M/UL (ref 4–5.4)
SODIUM SERPL-SCNC: 139 MMOL/L (ref 136–145)
WBC # BLD AUTO: 5.95 K/UL (ref 3.9–12.7)

## 2020-06-11 PROCEDURE — 86225 DNA ANTIBODY NATIVE: CPT

## 2020-06-11 PROCEDURE — 36415 COLL VENOUS BLD VENIPUNCTURE: CPT | Mod: PO

## 2020-06-11 PROCEDURE — 86140 C-REACTIVE PROTEIN: CPT

## 2020-06-11 PROCEDURE — 80053 COMPREHEN METABOLIC PANEL: CPT

## 2020-06-11 PROCEDURE — 86160 COMPLEMENT ANTIGEN: CPT

## 2020-06-11 PROCEDURE — 85652 RBC SED RATE AUTOMATED: CPT

## 2020-06-11 PROCEDURE — 85025 COMPLETE CBC W/AUTO DIFF WBC: CPT

## 2020-06-11 PROCEDURE — 86160 COMPLEMENT ANTIGEN: CPT | Mod: 59

## 2020-06-12 ENCOUNTER — LAB VISIT (OUTPATIENT)
Dept: LAB | Facility: HOSPITAL | Age: 55
End: 2020-06-12
Attending: INTERNAL MEDICINE
Payer: COMMERCIAL

## 2020-06-12 DIAGNOSIS — M32.9 SYSTEMIC LUPUS ERYTHEMATOSUS, UNSPECIFIED SLE TYPE, UNSPECIFIED ORGAN INVOLVEMENT STATUS: ICD-10-CM

## 2020-06-12 LAB
BACTERIA #/AREA URNS AUTO: NORMAL /HPF
BILIRUB UR QL STRIP: NEGATIVE
CLARITY UR REFRACT.AUTO: ABNORMAL
COLOR UR AUTO: ABNORMAL
DSDNA AB SER-ACNC: NORMAL [IU]/ML
GLUCOSE UR QL STRIP: NEGATIVE
HGB UR QL STRIP: NEGATIVE
KETONES UR QL STRIP: NEGATIVE
LEUKOCYTE ESTERASE UR QL STRIP: NEGATIVE
MICROSCOPIC COMMENT: NORMAL
NITRITE UR QL STRIP: NEGATIVE
PH UR STRIP: 6 [PH] (ref 5–8)
PROT UR QL STRIP: NEGATIVE
SP GR UR STRIP: 1.01 (ref 1–1.03)
SQUAMOUS #/AREA URNS AUTO: 7 /HPF
URN SPEC COLLECT METH UR: ABNORMAL
WBC #/AREA URNS AUTO: 1 /HPF (ref 0–5)

## 2020-06-12 PROCEDURE — 81001 URINALYSIS AUTO W/SCOPE: CPT

## 2020-06-12 PROCEDURE — 82570 ASSAY OF URINE CREATININE: CPT

## 2020-06-13 LAB
CREAT UR-MCNC: 97 MG/DL (ref 15–325)
PROT UR-MCNC: <7 MG/DL (ref 0–15)
PROT/CREAT UR: NORMAL MG/G{CREAT} (ref 0–0.2)

## 2020-07-02 RX ORDER — PREDNISONE 5 MG/1
5 TABLET ORAL DAILY
Qty: 90 TABLET | Refills: 1 | Status: SHIPPED | OUTPATIENT
Start: 2020-07-02 | End: 2020-07-06 | Stop reason: SDUPTHER

## 2020-07-06 DIAGNOSIS — M32.19 OTHER SYSTEMIC LUPUS ERYTHEMATOSUS WITH OTHER ORGAN INVOLVEMENT: ICD-10-CM

## 2020-07-06 RX ORDER — PREDNISONE 5 MG/1
5 TABLET ORAL DAILY
Qty: 90 TABLET | Refills: 1 | Status: SHIPPED | OUTPATIENT
Start: 2020-07-06 | End: 2020-09-08 | Stop reason: SDUPTHER

## 2020-07-06 RX ORDER — AZATHIOPRINE 50 MG/1
100 TABLET ORAL DAILY
Qty: 180 TABLET | Refills: 0 | Status: SHIPPED | OUTPATIENT
Start: 2020-07-06 | End: 2020-08-10 | Stop reason: SDUPTHER

## 2020-08-03 DIAGNOSIS — M32.19 OTHER SYSTEMIC LUPUS ERYTHEMATOSUS WITH OTHER ORGAN INVOLVEMENT: ICD-10-CM

## 2020-08-03 RX ORDER — AZATHIOPRINE 50 MG/1
100 TABLET ORAL DAILY
Qty: 180 TABLET | Refills: 0 | Status: CANCELLED | OUTPATIENT
Start: 2020-08-03

## 2020-08-04 NOTE — TELEPHONE ENCOUNTER
Medication was sent on 7/1/2020 to her preferred pharmacy for 3 months. Refill request is not due until 10/1/2020

## 2020-08-10 DIAGNOSIS — M32.19 OTHER SYSTEMIC LUPUS ERYTHEMATOSUS WITH OTHER ORGAN INVOLVEMENT: ICD-10-CM

## 2020-08-10 RX ORDER — AZATHIOPRINE 50 MG/1
100 TABLET ORAL DAILY
Qty: 60 TABLET | Refills: 0 | Status: SHIPPED | OUTPATIENT
Start: 2020-08-10 | End: 2020-09-08 | Stop reason: SDUPTHER

## 2020-09-03 ENCOUNTER — LAB VISIT (OUTPATIENT)
Dept: LAB | Facility: HOSPITAL | Age: 55
End: 2020-09-03
Attending: INTERNAL MEDICINE
Payer: COMMERCIAL

## 2020-09-03 DIAGNOSIS — M32.9 SYSTEMIC LUPUS ERYTHEMATOSUS, UNSPECIFIED SLE TYPE, UNSPECIFIED ORGAN INVOLVEMENT STATUS: ICD-10-CM

## 2020-09-03 LAB
25(OH)D3+25(OH)D2 SERPL-MCNC: 51 NG/ML (ref 30–96)
BASOPHILS # BLD AUTO: 0.04 K/UL (ref 0–0.2)
BASOPHILS NFR BLD: 0.8 % (ref 0–1.9)
DIFFERENTIAL METHOD: ABNORMAL
EOSINOPHIL # BLD AUTO: 0.1 K/UL (ref 0–0.5)
EOSINOPHIL NFR BLD: 1.2 % (ref 0–8)
ERYTHROCYTE [DISTWIDTH] IN BLOOD BY AUTOMATED COUNT: 12.1 % (ref 11.5–14.5)
ERYTHROCYTE [SEDIMENTATION RATE] IN BLOOD BY WESTERGREN METHOD: 3 MM/HR (ref 0–20)
HCT VFR BLD AUTO: 44.6 % (ref 37–48.5)
HGB BLD-MCNC: 14.8 G/DL (ref 12–16)
IMM GRANULOCYTES # BLD AUTO: 0.02 K/UL (ref 0–0.04)
IMM GRANULOCYTES NFR BLD AUTO: 0.4 % (ref 0–0.5)
LYMPHOCYTES # BLD AUTO: 0.8 K/UL (ref 1–4.8)
LYMPHOCYTES NFR BLD: 16.6 % (ref 18–48)
MCH RBC QN AUTO: 33.6 PG (ref 27–31)
MCHC RBC AUTO-ENTMCNC: 33.2 G/DL (ref 32–36)
MCV RBC AUTO: 101 FL (ref 82–98)
MONOCYTES # BLD AUTO: 0.6 K/UL (ref 0.3–1)
MONOCYTES NFR BLD: 11.5 % (ref 4–15)
NEUTROPHILS # BLD AUTO: 3.4 K/UL (ref 1.8–7.7)
NEUTROPHILS NFR BLD: 69.5 % (ref 38–73)
NRBC BLD-RTO: 0 /100 WBC
PLATELET # BLD AUTO: 212 K/UL (ref 150–350)
PMV BLD AUTO: 11 FL (ref 9.2–12.9)
RBC # BLD AUTO: 4.4 M/UL (ref 4–5.4)
WBC # BLD AUTO: 4.88 K/UL (ref 3.9–12.7)

## 2020-09-03 PROCEDURE — 86160 COMPLEMENT ANTIGEN: CPT

## 2020-09-03 PROCEDURE — 86160 COMPLEMENT ANTIGEN: CPT | Mod: 59

## 2020-09-03 PROCEDURE — 86140 C-REACTIVE PROTEIN: CPT

## 2020-09-03 PROCEDURE — 85025 COMPLETE CBC W/AUTO DIFF WBC: CPT

## 2020-09-03 PROCEDURE — 86225 DNA ANTIBODY NATIVE: CPT

## 2020-09-03 PROCEDURE — 80053 COMPREHEN METABOLIC PANEL: CPT

## 2020-09-03 PROCEDURE — 36415 COLL VENOUS BLD VENIPUNCTURE: CPT | Mod: PO

## 2020-09-03 PROCEDURE — 85651 RBC SED RATE NONAUTOMATED: CPT

## 2020-09-03 PROCEDURE — 82306 VITAMIN D 25 HYDROXY: CPT

## 2020-09-04 ENCOUNTER — TELEPHONE (OUTPATIENT)
Dept: RHEUMATOLOGY | Facility: CLINIC | Age: 55
End: 2020-09-04

## 2020-09-04 LAB
ALBUMIN SERPL BCP-MCNC: 4.1 G/DL (ref 3.5–5.2)
ALP SERPL-CCNC: 53 U/L (ref 55–135)
ALT SERPL W/O P-5'-P-CCNC: 19 U/L (ref 10–44)
ANION GAP SERPL CALC-SCNC: 12 MMOL/L (ref 8–16)
AST SERPL-CCNC: 23 U/L (ref 10–40)
BILIRUB SERPL-MCNC: 0.7 MG/DL (ref 0.1–1)
BUN SERPL-MCNC: 20 MG/DL (ref 6–20)
C3 SERPL-MCNC: 101 MG/DL (ref 50–180)
C4 SERPL-MCNC: 12 MG/DL (ref 11–44)
CALCIUM SERPL-MCNC: 8.6 MG/DL (ref 8.7–10.5)
CHLORIDE SERPL-SCNC: 105 MMOL/L (ref 95–110)
CO2 SERPL-SCNC: 21 MMOL/L (ref 23–29)
CREAT SERPL-MCNC: 0.9 MG/DL (ref 0.5–1.4)
CRP SERPL-MCNC: 0.2 MG/L (ref 0–8.2)
DSDNA AB SER-ACNC: NORMAL [IU]/ML
EST. GFR  (AFRICAN AMERICAN): >60 ML/MIN/1.73 M^2
EST. GFR  (NON AFRICAN AMERICAN): >60 ML/MIN/1.73 M^2
GLUCOSE SERPL-MCNC: 69 MG/DL (ref 70–110)
POTASSIUM SERPL-SCNC: 3.8 MMOL/L (ref 3.5–5.1)
PROT SERPL-MCNC: 7.1 G/DL (ref 6–8.4)
SODIUM SERPL-SCNC: 138 MMOL/L (ref 136–145)

## 2020-09-08 ENCOUNTER — CLINICAL SUPPORT (OUTPATIENT)
Dept: INFECTIOUS DISEASES | Facility: CLINIC | Age: 55
End: 2020-09-08
Payer: COMMERCIAL

## 2020-09-08 ENCOUNTER — OFFICE VISIT (OUTPATIENT)
Dept: RHEUMATOLOGY | Facility: CLINIC | Age: 55
End: 2020-09-08
Payer: COMMERCIAL

## 2020-09-08 VITALS
WEIGHT: 143 LBS | SYSTOLIC BLOOD PRESSURE: 99 MMHG | HEART RATE: 61 BPM | DIASTOLIC BLOOD PRESSURE: 71 MMHG | HEIGHT: 64 IN | BODY MASS INDEX: 24.41 KG/M2

## 2020-09-08 DIAGNOSIS — M32.9 SYSTEMIC LUPUS ERYTHEMATOSUS, UNSPECIFIED SLE TYPE, UNSPECIFIED ORGAN INVOLVEMENT STATUS: ICD-10-CM

## 2020-09-08 DIAGNOSIS — E83.51 HYPOCALCEMIA: Primary | ICD-10-CM

## 2020-09-08 DIAGNOSIS — Z78.0 MENOPAUSE: ICD-10-CM

## 2020-09-08 DIAGNOSIS — Z00.00 HEALTHCARE MAINTENANCE: ICD-10-CM

## 2020-09-08 DIAGNOSIS — M32.19 OTHER SYSTEMIC LUPUS ERYTHEMATOSUS WITH OTHER ORGAN INVOLVEMENT: ICD-10-CM

## 2020-09-08 PROCEDURE — 99999 PR PBB SHADOW E&M-EST. PATIENT-LVL III: CPT | Mod: PBBFAC,,, | Performed by: INTERNAL MEDICINE

## 2020-09-08 PROCEDURE — 90750 ZOSTER RECOMBINANT VACCINE: ICD-10-PCS | Mod: S$GLB,,, | Performed by: INTERNAL MEDICINE

## 2020-09-08 PROCEDURE — 90471 IMMUNIZATION ADMIN: CPT | Mod: S$GLB,,, | Performed by: INTERNAL MEDICINE

## 2020-09-08 PROCEDURE — 90471 ZOSTER RECOMBINANT VACCINE: ICD-10-PCS | Mod: S$GLB,,, | Performed by: INTERNAL MEDICINE

## 2020-09-08 PROCEDURE — 90750 HZV VACC RECOMBINANT IM: CPT | Mod: S$GLB,,, | Performed by: INTERNAL MEDICINE

## 2020-09-08 PROCEDURE — 3008F PR BODY MASS INDEX (BMI) DOCUMENTED: ICD-10-PCS | Mod: CPTII,S$GLB,, | Performed by: INTERNAL MEDICINE

## 2020-09-08 PROCEDURE — 99999 PR PBB SHADOW E&M-EST. PATIENT-LVL III: ICD-10-PCS | Mod: PBBFAC,,, | Performed by: INTERNAL MEDICINE

## 2020-09-08 PROCEDURE — 99214 PR OFFICE/OUTPT VISIT, EST, LEVL IV, 30-39 MIN: ICD-10-PCS | Mod: S$GLB,,, | Performed by: INTERNAL MEDICINE

## 2020-09-08 PROCEDURE — 3008F BODY MASS INDEX DOCD: CPT | Mod: CPTII,S$GLB,, | Performed by: INTERNAL MEDICINE

## 2020-09-08 PROCEDURE — 99214 OFFICE O/P EST MOD 30 MIN: CPT | Mod: S$GLB,,, | Performed by: INTERNAL MEDICINE

## 2020-09-08 RX ORDER — PREDNISONE 5 MG/1
5 TABLET ORAL DAILY
Qty: 90 TABLET | Refills: 1 | Status: SHIPPED | OUTPATIENT
Start: 2020-09-08 | End: 2021-01-14

## 2020-09-08 RX ORDER — AZATHIOPRINE 50 MG/1
100 TABLET ORAL DAILY
Qty: 60 TABLET | Refills: 0 | Status: SHIPPED | OUTPATIENT
Start: 2020-09-08 | End: 2020-11-09 | Stop reason: SDUPTHER

## 2020-09-08 ASSESSMENT — SYSTEMIC LUPUS ERYTHEMATOSUS DISEASE ACTIVITY INDEX (SLEDAI): TOTAL_SCORE: 6

## 2020-09-08 NOTE — PROGRESS NOTES
I have personally taken the history and examined the patient and agree with the resident,s note as stated above       SLE: SLEDAI 6 (mild activity) despite prednisone 5mg daily and max tolerated dose of azathioprine d/t leukopenia  OA hands, left knee  Osteopenia with low FRAX 12/13/18      *Shingrix today  *Tdap next visit  *high dose flu vaccine in October  Add belimumab 200mg sc q wk for lupus arthritis and as steroid sparing as on max tolerated dose of azathioprine  Diclofenac gel prn for OA joints  RTC 3 months with standing lupus labs and DXA.              Answers for HPI/ROS submitted by the patient on 9/8/2020   fever: No  eye redness: No  mouth sores: No  headaches: No  shortness of breath: No  chest pain: No  trouble swallowing: No  diarrhea: No  constipation: No  unexpected weight change: No  genital sore: No  dysuria: No  During the last 3 days, have you had a skin rash?: No  Bruises or bleeds easily: No  cough: No

## 2020-09-08 NOTE — PROGRESS NOTES
Subjective:       Patient ID: Madeleine Nunez is a 55 y.o. female.    Chief Complaint: SLE  HPI   Last clinic visit was 3/5/20. She continues to take prednisone 5mg daily and Imuran 100mg daily with no side effects. She finds that her joints have been bothering her moreso recently, especially knees, hands and ankles. Continues to exercise, though she has not been able to run due to the joint pain. She walks about 3 miles every morning. No rashes, oral ulcers, chest pain, joint swelling/redness, headaches or vision changes.  She does admit to increased hair loss recently but no significant bald spots.      Review of Systems   Constitutional: Negative for fever and unexpected weight change.   HENT: Negative for mouth sores and trouble swallowing.    Eyes: Negative for redness.   Respiratory: Negative for cough and shortness of breath.    Cardiovascular: Negative for chest pain.   Gastrointestinal: Negative for constipation and diarrhea.   Genitourinary: Negative for dysuria and genital sores.   Skin: Negative for rash.   Neurological: Negative for headaches.   Hematological: Does not bruise/bleed easily.         Objective:     Vitals:    09/08/20 0954   BP: 99/71   Pulse: 61          Physical Exam   Constitutional: She is oriented to person, place, and time and well-developed, well-nourished, and in no distress.   HENT:   Head: Normocephalic and atraumatic.   Eyes: EOM are normal.   Neck: Normal range of motion.   Cardiovascular: Normal rate and regular rhythm.    Pulmonary/Chest: Effort normal and breath sounds normal.   Abdominal: Soft.       Left Side Rheumatological Exam     The patient is tender to palpation of the 5th PIP and 5th DIP.      Neurological: She is alert and oriented to person, place, and time.   Skin: Skin is warm and dry.     Musculoskeletal:      Comments: Crepitus in bilateral knees.  5/5 strength in bilateral upper and lower extremities.  Heberden node in left 5th DIP. Osteoarthritic changes  in right thumb.               Assessment:       1. Hypocalcemia    2. Systemic lupus erythematosus, unspecified SLE type, unspecified organ involvement status    3. Healthcare maintenance    4. Other systemic lupus erythematosus with other organ involvement        - Continue using Voltaren gel on painful joints PRN  - Continue Prednisone 5mg daily, would like to try to wean this down in the future.   - Continue Imuran 100mg daily  - SLEDAI 6 today (arthritis and alopecia)  - Will start belimumab 200mg/mL injection once weekly and monitor progress over next month. Will hopefully be able to lower need for prednisone and/or Imuran.  - Last lipid panel in 2018. Will check with next standing labs.   - Tdap vaccine ordered.   - Second Shingrix today. 1st dose in March 2020.  - Recommend flu vaccine when available.  - Due for repeat DEXA scan in December at next appointment.    Follow up 3 months.    Plan:       Problem List Items Addressed This Visit        Immunology/Multi System    SLE (systemic lupus erythematosus)    Relevant Medications    belimumab (BENLYSTA) 200 mg/mL AtIn    azaTHIOprine (IMURAN) 50 mg Tab    predniSONE (DELTASONE) 5 MG tablet    Other Relevant Orders    POCT Lipid Panel      Other Visit Diagnoses     Hypocalcemia    -  Primary    Healthcare maintenance        Relevant Orders    (In Office Administered) Tdap Vaccine        Alvina Coreas MD  [employer] PGY-1

## 2020-09-09 ENCOUNTER — TELEPHONE (OUTPATIENT)
Dept: PHARMACY | Facility: CLINIC | Age: 55
End: 2020-09-09

## 2020-09-09 NOTE — TELEPHONE ENCOUNTER
LVM for callback to inform patient that [facility] received prescription for Benlysta and prior authorization is required.  [facility] will be back in touch once insurance determination is received.

## 2020-09-14 NOTE — TELEPHONE ENCOUNTER
DOCUMENTATION ONLY:  Prior authorization for Markelllysta approved from 9/11/2020 to 9/11/2021.  Case ID# [id number]    Patient Assistance IS required.     Forward to patient assistance for review.      [facility]   [phone] (Phone)  [phone] (Fax)

## 2020-09-16 DIAGNOSIS — M32.9 SYSTEMIC LUPUS ERYTHEMATOSUS, UNSPECIFIED SLE TYPE, UNSPECIFIED ORGAN INVOLVEMENT STATUS: Primary | ICD-10-CM

## 2020-09-16 RX ORDER — BELIMUMAB 200 MG/ML
200 SOLUTION SUBCUTANEOUS
Qty: 4 ML | Refills: 2 | Status: SHIPPED | OUTPATIENT
Start: 2020-09-16 | End: 2020-11-17

## 2020-09-16 NOTE — TELEPHONE ENCOUNTER
LVM for patient to notify her of Benlysta approval and need to fill with [facility]. [facility] can obtain savings card on her behalf to reduce any OOP cost to $0.00. Will continue to follow up.

## 2020-09-16 NOTE — TELEPHONE ENCOUNTER
FOR DOCUMENTATION ONLY:  Financial Assistance for Ian thomas  Source: Copay Card  BIN: [id number]  Id: [id number]  GRP: [id number]  Debit Card#  [id number]  Exp 09/20    Must use [facility]  Ph: [phone]  Fx: [phone]

## 2020-09-16 NOTE — TELEPHONE ENCOUNTER
Patient notified of Benlysta approval and need to fill with [facility]. She gives permission for [facility] can obtain savings card on her behalf to reduce any OOP cost to $0.00. Patient gives permission for [employer] message to be sent with savings card and pharmacy details. Patient will notify [facility] of any additional questions or concerns.

## 2020-09-23 ENCOUNTER — PATIENT MESSAGE (OUTPATIENT)
Dept: PHARMACY | Facility: CLINIC | Age: 55
End: 2020-09-23

## 2020-09-27 ENCOUNTER — PATIENT MESSAGE (OUTPATIENT)
Dept: RHEUMATOLOGY | Facility: CLINIC | Age: 55
End: 2020-09-27

## 2020-10-13 ENCOUNTER — TELEPHONE (OUTPATIENT)
Dept: RHEUMATOLOGY | Facility: CLINIC | Age: 55
End: 2020-10-13

## 2020-10-13 NOTE — TELEPHONE ENCOUNTER
Received report screening mammogram 3-D Nadir 9/21/20 ordered by Ian Lopez  [facility]::    developing indeterminate right breast microcalcifications, further w/u spot compression magnification advised    10/6/20 additional views abnormal screening mammogram;    Small benign appearing cluster of microcalcifications confirmed right upper outer quadrant, 6 month f/u right mammogram with magnification views advised. BIRADS 3 probably benign  Breast tissue density C; heterogeneously dense    Please tell pt to be sure and f/u with Dr. Lopez her Ob-Gyn and will need repeat mammogram in 6 months.

## 2020-10-14 ENCOUNTER — PATIENT MESSAGE (OUTPATIENT)
Dept: RHEUMATOLOGY | Facility: CLINIC | Age: 55
End: 2020-10-14

## 2020-10-14 ENCOUNTER — TELEPHONE (OUTPATIENT)
Dept: RHEUMATOLOGY | Facility: CLINIC | Age: 55
End: 2020-10-14

## 2020-10-14 DIAGNOSIS — H81.09 MENIERE'S DISEASE, UNSPECIFIED LATERALITY: Primary | ICD-10-CM

## 2020-11-08 ENCOUNTER — PATIENT MESSAGE (OUTPATIENT)
Dept: RHEUMATOLOGY | Facility: CLINIC | Age: 55
End: 2020-11-08

## 2020-11-09 DIAGNOSIS — M32.19 OTHER SYSTEMIC LUPUS ERYTHEMATOSUS WITH OTHER ORGAN INVOLVEMENT: ICD-10-CM

## 2020-11-09 RX ORDER — AZATHIOPRINE 50 MG/1
100 TABLET ORAL DAILY
Qty: 60 TABLET | Refills: 0 | Status: SHIPPED | OUTPATIENT
Start: 2020-11-09 | End: 2021-01-11

## 2020-11-11 ENCOUNTER — OFFICE VISIT (OUTPATIENT)
Dept: OTOLARYNGOLOGY | Facility: CLINIC | Age: 55
End: 2020-11-11
Payer: COMMERCIAL

## 2020-11-11 ENCOUNTER — CLINICAL SUPPORT (OUTPATIENT)
Dept: AUDIOLOGY | Facility: CLINIC | Age: 55
End: 2020-11-11
Payer: COMMERCIAL

## 2020-11-11 VITALS — WEIGHT: 138.69 LBS | BODY MASS INDEX: 24.1 KG/M2

## 2020-11-11 DIAGNOSIS — H81.01 MENIERE'S DISEASE OF RIGHT EAR: ICD-10-CM

## 2020-11-11 DIAGNOSIS — R42 DIZZINESS AND GIDDINESS: Primary | ICD-10-CM

## 2020-11-11 DIAGNOSIS — H91.91 HIGH-FREQUENCY HEARING LOSS OF RIGHT EAR: ICD-10-CM

## 2020-11-11 PROCEDURE — 3008F PR BODY MASS INDEX (BMI) DOCUMENTED: ICD-10-PCS | Mod: CPTII,S$GLB,, | Performed by: OTOLARYNGOLOGY

## 2020-11-11 PROCEDURE — 92567 PR TYMPA2METRY: ICD-10-PCS | Mod: S$GLB,,, | Performed by: AUDIOLOGIST

## 2020-11-11 PROCEDURE — 99999 PR PBB SHADOW E&M-EST. PATIENT-LVL III: ICD-10-PCS | Mod: PBBFAC,,, | Performed by: OTOLARYNGOLOGY

## 2020-11-11 PROCEDURE — 92557 COMPREHENSIVE HEARING TEST: CPT | Mod: S$GLB,,, | Performed by: AUDIOLOGIST

## 2020-11-11 PROCEDURE — 99999 PR PBB SHADOW E&M-EST. PATIENT-LVL III: CPT | Mod: PBBFAC,,, | Performed by: OTOLARYNGOLOGY

## 2020-11-11 PROCEDURE — 99205 OFFICE O/P NEW HI 60 MIN: CPT | Mod: S$GLB,,, | Performed by: OTOLARYNGOLOGY

## 2020-11-11 PROCEDURE — 92557 PR COMPREHENSIVE HEARING TEST: ICD-10-PCS | Mod: S$GLB,,, | Performed by: AUDIOLOGIST

## 2020-11-11 PROCEDURE — 99205 PR OFFICE/OUTPT VISIT, NEW, LEVL V, 60-74 MIN: ICD-10-PCS | Mod: S$GLB,,, | Performed by: OTOLARYNGOLOGY

## 2020-11-11 PROCEDURE — 3008F BODY MASS INDEX DOCD: CPT | Mod: CPTII,S$GLB,, | Performed by: OTOLARYNGOLOGY

## 2020-11-11 PROCEDURE — 92567 TYMPANOMETRY: CPT | Mod: S$GLB,,, | Performed by: AUDIOLOGIST

## 2020-11-11 NOTE — PROGRESS NOTES
Subjective:       Patient ID: Madeleine Nunez is a 55 y.o. female.    Chief Complaint: Tinnitus    HPI: Pt of Dr Lui.    Ref Dr Bender.    Has episodic vert/ R fluct HL/ Tinn.    Better with B hist 12 Tid. Still with sx's. Better when on allergy Rx from Dr Lui. Seeing Dr Finley.    Dx's R MD.    No real MAD sx's.    Saw me 2015 with initial dizz spell.    Audio last week with 40 db LF SNHL AD.    MRI neg.    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    Past Surgical History: Patient has a past surgical history that includes Cholecystectomy.    Social History: Patient reports that she has never smoked. She has never used smokeless tobacco. She reports current alcohol use. She reports that she does not use drugs.    Family History: family history includes Breast cancer in her paternal grandmother; Cataracts in her father and mother; Diabetes in her father and mother; Glaucoma in her mother, sister, and sister; Hypertension in her brother; Kidney failure in her sister; Lupus in her sister and sister; Melanoma in her father; Thyroid cancer in her father and sister.    Medications:   Current Outpatient Medications   Medication Sig    azaTHIOprine (IMURAN) 50 mg Tab Take 2 tablets (100 mg total) by mouth once daily.    belimumab (BENLYSTA) 200 mg/mL AtIn Inject 1 mL (200 mg total) into the skin every 7 days.    betahistine HCl (BETAHISTINE, BULK, MISC) 12 mg by Misc.(Non-Drug; Combo Route) route 2 (two) times daily.    diclofenac sodium (SOLARAZE) 3 % gel Apply liberally to affected painful joint 4 times per day    levothyroxine (SYNTHROID) 75 MCG tablet Take 88 mcg by mouth.     meclizine (ANTIVERT) 25 mg tablet Take 25  mg by mouth.    predniSONE (DELTASONE) 5 MG tablet Take 1 tablet (5 mg total) by mouth once daily.    progesterone (PROMETRIUM) 200 MG capsule TAKE 1 CAPSULE BY MOUTH EVERY EVENING FROM 7-9PM    spironolactone (ALDACTONE) 50 MG tablet TK 1 T PO QD     No current facility-administered medications for this visit.        Allergies: Patient is allergic to sulfa (sulfonamide antibiotics) and sulfamethoxazole-trimethoprim.    Review of Systems   Constitutional: Negative.  Negative for activity change, appetite change, chills, diaphoresis, fatigue, fever and unexpected weight change.   HENT: Positive for hearing loss and postnasal drip. Negative for nasal congestion, ear discharge, ear pain, facial swelling, nosebleeds, rhinorrhea, sinus pressure/congestion, sneezing, sore throat, tinnitus, trouble swallowing and voice change.    Eyes: Negative.  Negative for photophobia, pain, discharge, redness and visual disturbance.   Respiratory: Negative.  Negative for cough, chest tightness, shortness of breath and wheezing.    Cardiovascular: Negative.  Negative for chest pain and palpitations.   Gastrointestinal: Negative.  Negative for abdominal pain, constipation, diarrhea and nausea.   Endocrine: Negative.    Genitourinary: Negative.  Negative for dysuria and frequency.   Musculoskeletal: Negative for arthralgias, back pain, gait problem, joint swelling, myalgias, neck pain and neck stiffness.   Integumentary:  Negative for color change, pallor and rash. Negative.   Neurological: Positive for dizziness. Negative for tremors, seizures, syncope, facial asymmetry, speech difficulty, weakness, light-headedness, numbness and headaches.   Hematological: Negative.  Negative for adenopathy. Does not bruise/bleed easily.   Psychiatric/Behavioral: Negative.  Negative for agitation, confusion, decreased concentration, dysphoric mood and sleep disturbance. The patient is not nervous/anxious and is not hyperactive.          Objective:       Physical Exam  Vitals signs and nursing note reviewed.   Constitutional:       General: She is not in acute distress.     Appearance: Normal appearance. She is well-developed. She is not ill-appearing, toxic-appearing or diaphoretic.   HENT:      Head: Normocephalic and atraumatic. Not macrocephalic and not microcephalic. No raccoon eyes, Stokes's sign, abrasion, contusion, right periorbital erythema, left periorbital erythema or laceration. Hair is normal.      Comments:     CN II-XII Nl.    EOM's nl.         Right Ear: Ear canal normal. No decreased hearing noted. No laceration, drainage, swelling or tenderness. No middle ear effusion. No foreign body. No mastoid tenderness. No hemotympanum. Tympanic membrane is not injected, scarred, perforated, erythematous, retracted or bulging. Tympanic membrane has normal mobility.      Left Ear: Ear canal normal. No decreased hearing noted. No laceration, drainage, swelling or tenderness.  No middle ear effusion. No foreign body. No mastoid tenderness. No hemotympanum. Tympanic membrane is not injected, scarred, perforated, erythematous, retracted or bulging. Tympanic membrane has normal mobility.      Nose: No nasal deformity, septal deviation, laceration, mucosal edema or rhinorrhea.      Right Sinus: No maxillary sinus tenderness.      Left Sinus: No maxillary sinus tenderness.   Eyes:      General: Lids are normal.      Conjunctiva/sclera: Conjunctivae normal.      Pupils: Pupils are equal, round, and reactive to light.   Neck:      Musculoskeletal: Normal range of motion and neck supple. Normal range of motion. No edema, erythema, neck rigidity or muscular tenderness.      Thyroid: No thyroid mass or thyromegaly.      Vascular: No JVD.      Trachea: No tracheal tenderness or tracheal deviation.   Cardiovascular:      Rate and Rhythm: Normal rate and regular rhythm.   Pulmonary:      Effort: Pulmonary effort is normal. No tachypnea, bradypnea, accessory muscle usage or  respiratory distress.      Breath sounds: No stridor.   Abdominal:      Palpations: Abdomen is soft.   Musculoskeletal: Normal range of motion.   Lymphadenopathy:      Head:      Right side of head: No submental, submandibular, tonsillar, preauricular or posterior auricular adenopathy.      Left side of head: No submental, submandibular, tonsillar, preauricular or posterior auricular adenopathy.      Cervical: No cervical adenopathy.      Right cervical: No superficial, deep or posterior cervical adenopathy.     Left cervical: No superficial, deep or posterior cervical adenopathy.   Skin:     General: Skin is warm and dry.      Coloration: Skin is not pale.      Findings: No abrasion, bruising, burn, ecchymosis, erythema, laceration, lesion or rash.   Neurological:      Mental Status: She is alert and oriented to person, place, and time.      Cranial Nerves: No cranial nerve deficit.      Sensory: No sensory deficit.      Motor: No tremor, atrophy, abnormal muscle tone or seizure activity.   Psychiatric:         Speech: She is communicative. Speech is not rapid and pressured or slurred.         Behavior: Behavior normal. Behavior is cooperative.         Thought Content: Thought content normal.         Judgment: Judgment normal.                 Assessment:       1. Meniere's disease of right ear        Plan:         Inc B hist to 16 Tid.    F/U Dr Lui for Allergy Rx.    Low na+ diet.    F/U prn.    May need R EMS.        Answers for HPI/ROS submitted by the patient on 11/9/2020   Seasonal Allergies?: Yes

## 2020-11-11 NOTE — PROGRESS NOTES
Madeleine Nunez was seen today in the clinic for an audiologic evaluation.  Patients main complaint was Meniere's Disease.  Mrs. Nunez reported that she is looking for a second opinion.   Pt reported fluctuating hearing loss and fullness in the right ear.  Pt has constant ringing in the right ear and intermittent tinnitus.      Tympanometry revealed Type A in the right ear and Type A in the left ear. Audiogram results revealed normal sloping to mild hearing loss at 8000 Hz in the right ear and normal hearing in the left ear.  Speech reception thresholds were noted at 10 dB in the right ear and 0 dB in the left ear.  Speech discrimination scores were 100% in the right ear and 100% in the left ear.    Recommendations:  1. Otologic evaluation  2. Annual audiogram  3. Noise protection when in noise

## 2020-11-11 NOTE — LETTER
November 11, 2020      Wander Bender MD  [address]  [facility] [address]           [facility]  [address]  [facility] [address]  Phone: [phone]  Fax: [phone]          Patient: Madeleine Nunez   MR Number: [id number]   YOB: 1965   Date of Visit: 11/11/2020       Dear Dr. Wander Bender:    Thank you for referring Madeleine Nunez to me for evaluation. Attached you will find relevant portions of my assessment and plan of care.    If you have questions, please do not hesitate to call me. I look forward to following Madeleine Nunez along with you.    Sincerely,    Teo Matt MD    Enclosure  CC:  No Recipients    If you would like to receive this communication electronically, please contact [email or web address] or [phone] to request more information on [facility] [employer] access.    For providers and/or their staff who would like to refer a patient to [facility], please contact us through our one-stop-shop provider referral line, [facility], at [phone].    If you feel you have received this communication in error or would no longer like to receive these types of communications, please e-mail [email or web address]

## 2021-01-05 ENCOUNTER — PATIENT MESSAGE (OUTPATIENT)
Dept: RHEUMATOLOGY | Facility: CLINIC | Age: 56
End: 2021-01-05

## 2021-01-06 ENCOUNTER — LAB VISIT (OUTPATIENT)
Dept: LAB | Facility: HOSPITAL | Age: 56
End: 2021-01-06
Attending: INTERNAL MEDICINE
Payer: COMMERCIAL

## 2021-01-06 DIAGNOSIS — M32.9 SYSTEMIC LUPUS ERYTHEMATOSUS, UNSPECIFIED SLE TYPE, UNSPECIFIED ORGAN INVOLVEMENT STATUS: ICD-10-CM

## 2021-01-06 LAB
BASOPHILS # BLD AUTO: 0.05 K/UL (ref 0–0.2)
BASOPHILS NFR BLD: 1.1 % (ref 0–1.9)
DIFFERENTIAL METHOD: ABNORMAL
EOSINOPHIL # BLD AUTO: 0 K/UL (ref 0–0.5)
EOSINOPHIL NFR BLD: 0.9 % (ref 0–8)
ERYTHROCYTE [DISTWIDTH] IN BLOOD BY AUTOMATED COUNT: 11.9 % (ref 11.5–14.5)
ERYTHROCYTE [SEDIMENTATION RATE] IN BLOOD BY WESTERGREN METHOD: 4 MM/HR (ref 0–20)
HCT VFR BLD AUTO: 44.9 % (ref 37–48.5)
HGB BLD-MCNC: 14.7 G/DL (ref 12–16)
IMM GRANULOCYTES # BLD AUTO: 0.02 K/UL (ref 0–0.04)
IMM GRANULOCYTES NFR BLD AUTO: 0.4 % (ref 0–0.5)
LYMPHOCYTES # BLD AUTO: 0.8 K/UL (ref 1–4.8)
LYMPHOCYTES NFR BLD: 16.7 % (ref 18–48)
MCH RBC QN AUTO: 33.2 PG (ref 27–31)
MCHC RBC AUTO-ENTMCNC: 32.7 G/DL (ref 32–36)
MCV RBC AUTO: 101 FL (ref 82–98)
MONOCYTES # BLD AUTO: 0.6 K/UL (ref 0.3–1)
MONOCYTES NFR BLD: 12.3 % (ref 4–15)
NEUTROPHILS # BLD AUTO: 3.1 K/UL (ref 1.8–7.7)
NEUTROPHILS NFR BLD: 68.6 % (ref 38–73)
NRBC BLD-RTO: 0 /100 WBC
PLATELET # BLD AUTO: 222 K/UL (ref 150–350)
PMV BLD AUTO: 10.7 FL (ref 9.2–12.9)
RBC # BLD AUTO: 4.43 M/UL (ref 4–5.4)
WBC # BLD AUTO: 4.48 K/UL (ref 3.9–12.7)

## 2021-01-06 PROCEDURE — 85651 RBC SED RATE NONAUTOMATED: CPT

## 2021-01-06 PROCEDURE — 80061 LIPID PANEL: CPT

## 2021-01-06 PROCEDURE — 85025 COMPLETE CBC W/AUTO DIFF WBC: CPT

## 2021-01-06 PROCEDURE — 86140 C-REACTIVE PROTEIN: CPT

## 2021-01-06 PROCEDURE — 36415 COLL VENOUS BLD VENIPUNCTURE: CPT | Mod: PO

## 2021-01-06 PROCEDURE — 86160 COMPLEMENT ANTIGEN: CPT | Mod: 59

## 2021-01-06 PROCEDURE — 80053 COMPREHEN METABOLIC PANEL: CPT

## 2021-01-06 PROCEDURE — 86225 DNA ANTIBODY NATIVE: CPT

## 2021-01-06 PROCEDURE — 86160 COMPLEMENT ANTIGEN: CPT

## 2021-01-07 ENCOUNTER — TELEPHONE (OUTPATIENT)
Dept: RHEUMATOLOGY | Facility: CLINIC | Age: 56
End: 2021-01-07

## 2021-01-07 ENCOUNTER — LAB VISIT (OUTPATIENT)
Dept: LAB | Facility: HOSPITAL | Age: 56
End: 2021-01-07
Attending: INTERNAL MEDICINE
Payer: COMMERCIAL

## 2021-01-07 DIAGNOSIS — E83.51 HYPOCALCEMIA: Primary | ICD-10-CM

## 2021-01-07 DIAGNOSIS — M32.9 SYSTEMIC LUPUS ERYTHEMATOSUS, UNSPECIFIED SLE TYPE, UNSPECIFIED ORGAN INVOLVEMENT STATUS: ICD-10-CM

## 2021-01-07 LAB
ALBUMIN SERPL BCP-MCNC: 3.8 G/DL (ref 3.5–5.2)
ALP SERPL-CCNC: 55 U/L (ref 55–135)
ALT SERPL W/O P-5'-P-CCNC: 14 U/L (ref 10–44)
AMORPH CRY UR QL COMP ASSIST: NORMAL
ANION GAP SERPL CALC-SCNC: 7 MMOL/L (ref 8–16)
AST SERPL-CCNC: 19 U/L (ref 10–40)
BACTERIA #/AREA URNS AUTO: NORMAL /HPF
BILIRUB SERPL-MCNC: 0.7 MG/DL (ref 0.1–1)
BILIRUB UR QL STRIP: NEGATIVE
BUN SERPL-MCNC: 17 MG/DL (ref 6–20)
C3 SERPL-MCNC: 98 MG/DL (ref 50–180)
C4 SERPL-MCNC: 14 MG/DL (ref 11–44)
CALCIUM SERPL-MCNC: 8.5 MG/DL (ref 8.7–10.5)
CHLORIDE SERPL-SCNC: 103 MMOL/L (ref 95–110)
CHOLEST SERPL-MCNC: 204 MG/DL (ref 120–199)
CHOLEST/HDLC SERPL: 3.6 {RATIO} (ref 2–5)
CLARITY UR REFRACT.AUTO: ABNORMAL
CO2 SERPL-SCNC: 27 MMOL/L (ref 23–29)
COLOR UR AUTO: YELLOW
CREAT SERPL-MCNC: 0.8 MG/DL (ref 0.5–1.4)
CRP SERPL-MCNC: 0.2 MG/L (ref 0–8.2)
DSDNA AB SER-ACNC: NORMAL [IU]/ML
EST. GFR  (AFRICAN AMERICAN): >60 ML/MIN/1.73 M^2
EST. GFR  (NON AFRICAN AMERICAN): >60 ML/MIN/1.73 M^2
GLUCOSE SERPL-MCNC: 88 MG/DL (ref 70–110)
GLUCOSE UR QL STRIP: NEGATIVE
HDLC SERPL-MCNC: 56 MG/DL (ref 40–75)
HDLC SERPL: 27.5 % (ref 20–50)
HGB UR QL STRIP: NEGATIVE
KETONES UR QL STRIP: NEGATIVE
LDLC SERPL CALC-MCNC: 133.2 MG/DL (ref 63–159)
LEUKOCYTE ESTERASE UR QL STRIP: ABNORMAL
MICROSCOPIC COMMENT: NORMAL
NITRITE UR QL STRIP: NEGATIVE
NONHDLC SERPL-MCNC: 148 MG/DL
PH UR STRIP: 5 [PH] (ref 5–8)
POTASSIUM SERPL-SCNC: 4.1 MMOL/L (ref 3.5–5.1)
PROT SERPL-MCNC: 6.9 G/DL (ref 6–8.4)
PROT UR QL STRIP: NEGATIVE
SODIUM SERPL-SCNC: 137 MMOL/L (ref 136–145)
SP GR UR STRIP: 1.02 (ref 1–1.03)
SQUAMOUS #/AREA URNS AUTO: 7 /HPF
TRIGL SERPL-MCNC: 74 MG/DL (ref 30–150)
URN SPEC COLLECT METH UR: ABNORMAL
WBC #/AREA URNS AUTO: 0 /HPF (ref 0–5)

## 2021-01-07 PROCEDURE — 84156 ASSAY OF PROTEIN URINE: CPT

## 2021-01-07 PROCEDURE — 81001 URINALYSIS AUTO W/SCOPE: CPT

## 2021-01-08 LAB
CREAT UR-MCNC: 211 MG/DL (ref 15–325)
PROT UR-MCNC: <7 MG/DL (ref 0–15)
PROT/CREAT UR: NORMAL MG/G{CREAT} (ref 0–0.2)

## 2021-01-14 ENCOUNTER — OFFICE VISIT (OUTPATIENT)
Dept: RHEUMATOLOGY | Facility: CLINIC | Age: 56
End: 2021-01-14
Payer: COMMERCIAL

## 2021-01-14 VITALS
BODY MASS INDEX: 24.61 KG/M2 | HEIGHT: 65 IN | HEART RATE: 62 BPM | DIASTOLIC BLOOD PRESSURE: 65 MMHG | SYSTOLIC BLOOD PRESSURE: 107 MMHG | WEIGHT: 147.69 LBS

## 2021-01-14 DIAGNOSIS — M32.19 OTHER SYSTEMIC LUPUS ERYTHEMATOSUS WITH OTHER ORGAN INVOLVEMENT: ICD-10-CM

## 2021-01-14 PROCEDURE — 1126F PR PAIN SEVERITY QUANTIFIED, NO PAIN PRESENT: ICD-10-PCS | Mod: S$GLB,,, | Performed by: INTERNAL MEDICINE

## 2021-01-14 PROCEDURE — 1126F AMNT PAIN NOTED NONE PRSNT: CPT | Mod: S$GLB,,, | Performed by: INTERNAL MEDICINE

## 2021-01-14 PROCEDURE — 99999 PR PBB SHADOW E&M-EST. PATIENT-LVL III: ICD-10-PCS | Mod: PBBFAC,,, | Performed by: INTERNAL MEDICINE

## 2021-01-14 PROCEDURE — 3008F BODY MASS INDEX DOCD: CPT | Mod: CPTII,S$GLB,, | Performed by: INTERNAL MEDICINE

## 2021-01-14 PROCEDURE — 99999 PR PBB SHADOW E&M-EST. PATIENT-LVL III: CPT | Mod: PBBFAC,,, | Performed by: INTERNAL MEDICINE

## 2021-01-14 PROCEDURE — 3008F PR BODY MASS INDEX (BMI) DOCUMENTED: ICD-10-PCS | Mod: CPTII,S$GLB,, | Performed by: INTERNAL MEDICINE

## 2021-01-14 PROCEDURE — 99214 PR OFFICE/OUTPT VISIT, EST, LEVL IV, 30-39 MIN: ICD-10-PCS | Mod: S$GLB,,, | Performed by: INTERNAL MEDICINE

## 2021-01-14 PROCEDURE — 99214 OFFICE O/P EST MOD 30 MIN: CPT | Mod: S$GLB,,, | Performed by: INTERNAL MEDICINE

## 2021-01-14 RX ORDER — PREDNISONE 1 MG/1
1 TABLET ORAL DAILY
Qty: 360 TABLET | Refills: 1 | Status: SHIPPED | OUTPATIENT
Start: 2021-01-14 | End: 2021-04-14 | Stop reason: ALTCHOICE

## 2021-01-14 RX ORDER — PREDNISONE 1 MG/1
1 TABLET ORAL DAILY
Qty: 90 TABLET | Refills: 1 | Status: SHIPPED | OUTPATIENT
Start: 2021-01-14 | End: 2021-01-14

## 2021-01-14 ASSESSMENT — SYSTEMIC LUPUS ERYTHEMATOSUS DISEASE ACTIVITY INDEX (SLEDAI): TOTAL_SCORE: 2

## 2021-01-15 ENCOUNTER — IMMUNIZATION (OUTPATIENT)
Dept: PHARMACY | Facility: CLINIC | Age: 56
End: 2021-01-15
Payer: COMMERCIAL

## 2021-01-16 ENCOUNTER — HOSPITAL ENCOUNTER (EMERGENCY)
Facility: HOSPITAL | Age: 56
Discharge: HOME | End: 2021-01-16
Attending: EMERGENCY MEDICINE
Payer: COMMERCIAL

## 2021-01-16 VITALS
HEART RATE: 58 BPM | SYSTOLIC BLOOD PRESSURE: 143 MMHG | TEMPERATURE: 98 F | HEIGHT: 64 IN | RESPIRATION RATE: 16 BRPM | BODY MASS INDEX: 23.9 KG/M2 | WEIGHT: 140 LBS | OXYGEN SATURATION: 99 % | DIASTOLIC BLOOD PRESSURE: 89 MMHG

## 2021-01-16 DIAGNOSIS — H81.09 MENIERE'S DISEASE, UNSPECIFIED LATERALITY: Primary | ICD-10-CM

## 2021-01-16 PROCEDURE — 25000003 PHARM REV CODE 250: Performed by: EMERGENCY MEDICINE

## 2021-01-16 PROCEDURE — 63600175 PHARM REV CODE 636 W HCPCS: Performed by: EMERGENCY MEDICINE

## 2021-01-16 PROCEDURE — 96360 HYDRATION IV INFUSION INIT: CPT

## 2021-01-16 PROCEDURE — 99284 EMERGENCY DEPT VISIT MOD MDM: CPT | Mod: 25

## 2021-01-16 PROCEDURE — 96372 THER/PROPH/DIAG INJ SC/IM: CPT | Mod: 59

## 2021-01-16 RX ORDER — ONDANSETRON 4 MG/1
8 TABLET, ORALLY DISINTEGRATING ORAL EVERY 6 HOURS PRN
Qty: 20 TABLET | Refills: 0 | Status: SHIPPED | OUTPATIENT
Start: 2021-01-16 | End: 2022-07-20

## 2021-01-16 RX ORDER — PROMETHAZINE HYDROCHLORIDE 25 MG/ML
25 INJECTION, SOLUTION INTRAMUSCULAR; INTRAVENOUS
Status: COMPLETED | OUTPATIENT
Start: 2021-01-16 | End: 2021-01-16

## 2021-01-16 RX ORDER — SODIUM CHLORIDE 9 MG/ML
INJECTION, SOLUTION INTRAVENOUS
Status: COMPLETED | OUTPATIENT
Start: 2021-01-16 | End: 2021-01-16

## 2021-01-16 RX ADMIN — SODIUM CHLORIDE 1000 ML/HR: 9 INJECTION, SOLUTION INTRAVENOUS at 08:01

## 2021-01-16 RX ADMIN — PROMETHAZINE HYDROCHLORIDE 25 MG: 25 INJECTION INTRAMUSCULAR; INTRAVENOUS at 08:01

## 2021-01-22 ENCOUNTER — LAB VISIT (OUTPATIENT)
Dept: LAB | Facility: HOSPITAL | Age: 56
End: 2021-01-22
Attending: INTERNAL MEDICINE
Payer: COMMERCIAL

## 2021-01-22 DIAGNOSIS — E83.51 HYPOCALCEMIA: ICD-10-CM

## 2021-01-22 LAB
CALCIUM SERPL-MCNC: 9.1 MG/DL (ref 8.7–10.5)
CALCIUM SERPL-MCNC: 9.1 MG/DL (ref 8.7–10.5)
MAGNESIUM SERPL-MCNC: 2 MG/DL (ref 1.6–2.6)
PHOSPHATE SERPL-MCNC: 3.4 MG/DL (ref 2.7–4.5)

## 2021-01-22 PROCEDURE — 82330 ASSAY OF CALCIUM: CPT

## 2021-01-22 PROCEDURE — 82310 ASSAY OF CALCIUM: CPT

## 2021-01-22 PROCEDURE — 82306 VITAMIN D 25 HYDROXY: CPT

## 2021-01-22 PROCEDURE — 83735 ASSAY OF MAGNESIUM: CPT

## 2021-01-22 PROCEDURE — 84100 ASSAY OF PHOSPHORUS: CPT

## 2021-01-22 PROCEDURE — 83970 ASSAY OF PARATHORMONE: CPT

## 2021-01-22 PROCEDURE — 36415 COLL VENOUS BLD VENIPUNCTURE: CPT | Mod: PO

## 2021-01-23 LAB
25(OH)D3+25(OH)D2 SERPL-MCNC: 55 NG/ML (ref 30–96)
CA-I BLDV-SCNC: 1.19 MMOL/L (ref 1.06–1.42)
CA-I BLDV-SCNC: 1.19 MMOL/L (ref 1.06–1.42)
PTH-INTACT SERPL-MCNC: 55 PG/ML (ref 9–77)

## 2021-01-26 ENCOUNTER — PATIENT MESSAGE (OUTPATIENT)
Dept: RHEUMATOLOGY | Facility: CLINIC | Age: 56
End: 2021-01-26

## 2021-01-27 ENCOUNTER — OFFICE VISIT (OUTPATIENT)
Dept: OTOLARYNGOLOGY | Facility: CLINIC | Age: 56
End: 2021-01-27
Payer: COMMERCIAL

## 2021-01-27 DIAGNOSIS — H81.01 MENIERE'S DISEASE OF RIGHT EAR: Primary | ICD-10-CM

## 2021-01-27 PROCEDURE — 99213 PR OFFICE/OUTPT VISIT, EST, LEVL III, 20-29 MIN: ICD-10-PCS | Mod: 95,,, | Performed by: OTOLARYNGOLOGY

## 2021-01-27 PROCEDURE — 99213 OFFICE O/P EST LOW 20 MIN: CPT | Mod: 95,,, | Performed by: OTOLARYNGOLOGY

## 2021-01-28 ENCOUNTER — PATIENT MESSAGE (OUTPATIENT)
Dept: OTOLARYNGOLOGY | Facility: CLINIC | Age: 56
End: 2021-01-28

## 2021-02-03 ENCOUNTER — PATIENT MESSAGE (OUTPATIENT)
Dept: RHEUMATOLOGY | Facility: CLINIC | Age: 56
End: 2021-02-03

## 2021-02-08 ENCOUNTER — TELEPHONE (OUTPATIENT)
Dept: RHEUMATOLOGY | Facility: CLINIC | Age: 56
End: 2021-02-08

## 2021-02-08 ENCOUNTER — PATIENT MESSAGE (OUTPATIENT)
Dept: RHEUMATOLOGY | Facility: CLINIC | Age: 56
End: 2021-02-08

## 2021-02-10 ENCOUNTER — PATIENT MESSAGE (OUTPATIENT)
Dept: RHEUMATOLOGY | Facility: CLINIC | Age: 56
End: 2021-02-10

## 2021-02-17 ENCOUNTER — CLINICAL SUPPORT (OUTPATIENT)
Dept: AUDIOLOGY | Facility: CLINIC | Age: 56
End: 2021-02-17
Payer: COMMERCIAL

## 2021-02-17 ENCOUNTER — OFFICE VISIT (OUTPATIENT)
Dept: OTOLARYNGOLOGY | Facility: CLINIC | Age: 56
End: 2021-02-17
Payer: COMMERCIAL

## 2021-02-17 VITALS — BODY MASS INDEX: 24.07 KG/M2 | WEIGHT: 140.19 LBS

## 2021-02-17 DIAGNOSIS — R42 MIGRAINOUS DIZZINESS: Primary | ICD-10-CM

## 2021-02-17 DIAGNOSIS — H90.3 SENSORINEURAL HEARING LOSS (SNHL) OF BOTH EARS: Primary | ICD-10-CM

## 2021-02-17 PROCEDURE — 92567 PR TYMPA2METRY: ICD-10-PCS | Mod: S$GLB,,, | Performed by: AUDIOLOGIST

## 2021-02-17 PROCEDURE — 1126F PR PAIN SEVERITY QUANTIFIED, NO PAIN PRESENT: ICD-10-PCS | Mod: S$GLB,,, | Performed by: OTOLARYNGOLOGY

## 2021-02-17 PROCEDURE — 99999 PR PBB SHADOW E&M-EST. PATIENT-LVL III: CPT | Mod: PBBFAC,,, | Performed by: OTOLARYNGOLOGY

## 2021-02-17 PROCEDURE — 99215 PR OFFICE/OUTPT VISIT, EST, LEVL V, 40-54 MIN: ICD-10-PCS | Mod: S$GLB,,, | Performed by: OTOLARYNGOLOGY

## 2021-02-17 PROCEDURE — 92557 COMPREHENSIVE HEARING TEST: CPT | Mod: S$GLB,,, | Performed by: AUDIOLOGIST

## 2021-02-17 PROCEDURE — 1126F AMNT PAIN NOTED NONE PRSNT: CPT | Mod: S$GLB,,, | Performed by: OTOLARYNGOLOGY

## 2021-02-17 PROCEDURE — 3008F BODY MASS INDEX DOCD: CPT | Mod: CPTII,S$GLB,, | Performed by: OTOLARYNGOLOGY

## 2021-02-17 PROCEDURE — 3008F PR BODY MASS INDEX (BMI) DOCUMENTED: ICD-10-PCS | Mod: CPTII,S$GLB,, | Performed by: OTOLARYNGOLOGY

## 2021-02-17 PROCEDURE — 92567 TYMPANOMETRY: CPT | Mod: S$GLB,,, | Performed by: AUDIOLOGIST

## 2021-02-17 PROCEDURE — 92557 PR COMPREHENSIVE HEARING TEST: ICD-10-PCS | Mod: S$GLB,,, | Performed by: AUDIOLOGIST

## 2021-02-17 PROCEDURE — 99999 PR PBB SHADOW E&M-EST. PATIENT-LVL III: ICD-10-PCS | Mod: PBBFAC,,, | Performed by: OTOLARYNGOLOGY

## 2021-02-17 PROCEDURE — 99215 OFFICE O/P EST HI 40 MIN: CPT | Mod: S$GLB,,, | Performed by: OTOLARYNGOLOGY

## 2021-02-17 RX ORDER — VERAPAMIL HYDROCHLORIDE 120 MG/1
120 TABLET, FILM COATED, EXTENDED RELEASE ORAL NIGHTLY
Qty: 30 TABLET | Refills: 5 | Status: SHIPPED | OUTPATIENT
Start: 2021-02-17 | End: 2021-03-19

## 2021-02-21 ENCOUNTER — PATIENT MESSAGE (OUTPATIENT)
Dept: RHEUMATOLOGY | Facility: CLINIC | Age: 56
End: 2021-02-21

## 2021-02-23 ENCOUNTER — PATIENT MESSAGE (OUTPATIENT)
Dept: RHEUMATOLOGY | Facility: CLINIC | Age: 56
End: 2021-02-23

## 2021-03-12 ENCOUNTER — PATIENT MESSAGE (OUTPATIENT)
Dept: RHEUMATOLOGY | Facility: CLINIC | Age: 56
End: 2021-03-12

## 2021-03-24 ENCOUNTER — OFFICE VISIT (OUTPATIENT)
Dept: OTOLARYNGOLOGY | Facility: CLINIC | Age: 56
End: 2021-03-24
Payer: COMMERCIAL

## 2021-03-24 ENCOUNTER — HOSPITAL ENCOUNTER (OUTPATIENT)
Dept: RADIOLOGY | Facility: CLINIC | Age: 56
Discharge: HOME | End: 2021-03-24
Attending: INTERNAL MEDICINE
Payer: COMMERCIAL

## 2021-03-24 VITALS — BODY MASS INDEX: 23.99 KG/M2 | WEIGHT: 139.75 LBS

## 2021-03-24 DIAGNOSIS — Z78.0 MENOPAUSE: ICD-10-CM

## 2021-03-24 DIAGNOSIS — R42 MIGRAINOUS DIZZINESS: Primary | ICD-10-CM

## 2021-03-24 PROCEDURE — 99999 PR PBB SHADOW E&M-EST. PATIENT-LVL III: CPT | Mod: PBBFAC,,, | Performed by: OTOLARYNGOLOGY

## 2021-03-24 PROCEDURE — 99999 PR PBB SHADOW E&M-EST. PATIENT-LVL III: ICD-10-PCS | Mod: PBBFAC,,, | Performed by: OTOLARYNGOLOGY

## 2021-03-24 PROCEDURE — 3008F PR BODY MASS INDEX (BMI) DOCUMENTED: ICD-10-PCS | Mod: CPTII,S$GLB,, | Performed by: OTOLARYNGOLOGY

## 2021-03-24 PROCEDURE — 77080 DEXA BONE DENSITY SPINE HIP: ICD-10-PCS | Mod: 26,,, | Performed by: INTERNAL MEDICINE

## 2021-03-24 PROCEDURE — 1126F AMNT PAIN NOTED NONE PRSNT: CPT | Mod: S$GLB,,, | Performed by: OTOLARYNGOLOGY

## 2021-03-24 PROCEDURE — 77080 DXA BONE DENSITY AXIAL: CPT | Mod: TC

## 2021-03-24 PROCEDURE — 99214 PR OFFICE/OUTPT VISIT, EST, LEVL IV, 30-39 MIN: ICD-10-PCS | Mod: S$GLB,,, | Performed by: OTOLARYNGOLOGY

## 2021-03-24 PROCEDURE — 99214 OFFICE O/P EST MOD 30 MIN: CPT | Mod: S$GLB,,, | Performed by: OTOLARYNGOLOGY

## 2021-03-24 PROCEDURE — 3008F BODY MASS INDEX DOCD: CPT | Mod: CPTII,S$GLB,, | Performed by: OTOLARYNGOLOGY

## 2021-03-24 PROCEDURE — 77080 DXA BONE DENSITY AXIAL: CPT | Mod: 26,,, | Performed by: INTERNAL MEDICINE

## 2021-03-24 PROCEDURE — 1126F PR PAIN SEVERITY QUANTIFIED, NO PAIN PRESENT: ICD-10-PCS | Mod: S$GLB,,, | Performed by: OTOLARYNGOLOGY

## 2021-03-24 RX ORDER — VERAPAMIL HYDROCHLORIDE 180 MG/1
180 CAPSULE, EXTENDED RELEASE ORAL NIGHTLY
Qty: 30 CAPSULE | Refills: 5 | Status: SHIPPED | OUTPATIENT
Start: 2021-03-24 | End: 2022-05-26

## 2021-04-14 ENCOUNTER — OFFICE VISIT (OUTPATIENT)
Dept: OPHTHALMOLOGY | Facility: CLINIC | Age: 56
End: 2021-04-14
Payer: COMMERCIAL

## 2021-04-14 DIAGNOSIS — M35.00 SICCA SYNDROME: ICD-10-CM

## 2021-04-14 DIAGNOSIS — M32.19 OTHER SYSTEMIC LUPUS ERYTHEMATOSUS WITH OTHER ORGAN INVOLVEMENT: ICD-10-CM

## 2021-04-14 DIAGNOSIS — Z83.511 FAMILY HISTORY OF OPEN-ANGLE GLAUCOMA: ICD-10-CM

## 2021-04-14 DIAGNOSIS — H40.023 OPEN ANGLE WITH BORDERLINE FINDINGS AND HIGH GLAUCOMA RISK IN BOTH EYES: Primary | ICD-10-CM

## 2021-04-14 PROCEDURE — 92133 POSTERIOR SEGMENT OCT OPTIC NERVE(OCULAR COHERENCE TOMOGRAPHY) - OU - BOTH EYES: ICD-10-PCS | Mod: S$GLB,,, | Performed by: OPHTHALMOLOGY

## 2021-04-14 PROCEDURE — 92133 CPTRZD OPH DX IMG PST SGM ON: CPT | Mod: S$GLB,,, | Performed by: OPHTHALMOLOGY

## 2021-04-14 PROCEDURE — 99999 PR PBB SHADOW E&M-EST. PATIENT-LVL III: ICD-10-PCS | Mod: PBBFAC,,, | Performed by: OPHTHALMOLOGY

## 2021-04-14 PROCEDURE — 99999 PR PBB SHADOW E&M-EST. PATIENT-LVL III: CPT | Mod: PBBFAC,,, | Performed by: OPHTHALMOLOGY

## 2021-04-14 PROCEDURE — 99203 PR OFFICE/OUTPT VISIT, NEW, LEVL III, 30-44 MIN: ICD-10-PCS | Mod: S$GLB,,, | Performed by: OPHTHALMOLOGY

## 2021-04-14 PROCEDURE — 99203 OFFICE O/P NEW LOW 30 MIN: CPT | Mod: S$GLB,,, | Performed by: OPHTHALMOLOGY

## 2021-04-22 ENCOUNTER — LAB VISIT (OUTPATIENT)
Dept: LAB | Facility: HOSPITAL | Age: 56
End: 2021-04-22
Attending: INTERNAL MEDICINE
Payer: COMMERCIAL

## 2021-04-22 ENCOUNTER — TELEPHONE (OUTPATIENT)
Dept: RHEUMATOLOGY | Facility: CLINIC | Age: 56
End: 2021-04-22

## 2021-04-22 DIAGNOSIS — E83.51 HYPOCALCEMIA: Primary | ICD-10-CM

## 2021-04-22 DIAGNOSIS — M32.9 SYSTEMIC LUPUS ERYTHEMATOSUS, UNSPECIFIED SLE TYPE, UNSPECIFIED ORGAN INVOLVEMENT STATUS: ICD-10-CM

## 2021-04-22 LAB
ALBUMIN SERPL BCP-MCNC: 3.8 G/DL (ref 3.5–5.2)
ALP SERPL-CCNC: 57 U/L (ref 55–135)
ALT SERPL W/O P-5'-P-CCNC: 13 U/L (ref 10–44)
ANION GAP SERPL CALC-SCNC: 4 MMOL/L (ref 8–16)
AST SERPL-CCNC: 17 U/L (ref 10–40)
BASOPHILS # BLD AUTO: 0.04 K/UL (ref 0–0.2)
BASOPHILS NFR BLD: 1.2 % (ref 0–1.9)
BILIRUB SERPL-MCNC: 0.5 MG/DL (ref 0.1–1)
BUN SERPL-MCNC: 16 MG/DL (ref 6–20)
C3 SERPL-MCNC: 87 MG/DL (ref 50–180)
C4 SERPL-MCNC: 12 MG/DL (ref 11–44)
CALCIUM SERPL-MCNC: 8.4 MG/DL (ref 8.7–10.5)
CHLORIDE SERPL-SCNC: 104 MMOL/L (ref 95–110)
CO2 SERPL-SCNC: 28 MMOL/L (ref 23–29)
CREAT SERPL-MCNC: 0.8 MG/DL (ref 0.5–1.4)
CRP SERPL-MCNC: 0.2 MG/L (ref 0–8.2)
DIFFERENTIAL METHOD: ABNORMAL
EOSINOPHIL # BLD AUTO: 0 K/UL (ref 0–0.5)
EOSINOPHIL NFR BLD: 1.2 % (ref 0–8)
ERYTHROCYTE [DISTWIDTH] IN BLOOD BY AUTOMATED COUNT: 12 % (ref 11.5–14.5)
ERYTHROCYTE [SEDIMENTATION RATE] IN BLOOD BY WESTERGREN METHOD: 14 MM/HR (ref 0–20)
EST. GFR  (AFRICAN AMERICAN): >60 ML/MIN/1.73 M^2
EST. GFR  (NON AFRICAN AMERICAN): >60 ML/MIN/1.73 M^2
GLUCOSE SERPL-MCNC: 87 MG/DL (ref 70–110)
HCT VFR BLD AUTO: 42 % (ref 37–48.5)
HGB BLD-MCNC: 14.2 G/DL (ref 12–16)
IMM GRANULOCYTES # BLD AUTO: 0.01 K/UL (ref 0–0.04)
IMM GRANULOCYTES NFR BLD AUTO: 0.3 % (ref 0–0.5)
LYMPHOCYTES # BLD AUTO: 0.5 K/UL (ref 1–4.8)
LYMPHOCYTES NFR BLD: 15.8 % (ref 18–48)
MCH RBC QN AUTO: 32.8 PG (ref 27–31)
MCHC RBC AUTO-ENTMCNC: 33.8 G/DL (ref 32–36)
MCV RBC AUTO: 97 FL (ref 82–98)
MONOCYTES # BLD AUTO: 0.5 K/UL (ref 0.3–1)
MONOCYTES NFR BLD: 14.3 % (ref 4–15)
NEUTROPHILS # BLD AUTO: 2.2 K/UL (ref 1.8–7.7)
NEUTROPHILS NFR BLD: 67.2 % (ref 38–73)
NRBC BLD-RTO: 0 /100 WBC
PLATELET # BLD AUTO: 217 K/UL (ref 150–450)
PMV BLD AUTO: 10.8 FL (ref 9.2–12.9)
POTASSIUM SERPL-SCNC: 4.2 MMOL/L (ref 3.5–5.1)
PROT SERPL-MCNC: 6.8 G/DL (ref 6–8.4)
RBC # BLD AUTO: 4.33 M/UL (ref 4–5.4)
SODIUM SERPL-SCNC: 136 MMOL/L (ref 136–145)
WBC # BLD AUTO: 3.22 K/UL (ref 3.9–12.7)

## 2021-04-22 PROCEDURE — 36415 COLL VENOUS BLD VENIPUNCTURE: CPT | Mod: PO | Performed by: INTERNAL MEDICINE

## 2021-04-22 PROCEDURE — 85025 COMPLETE CBC W/AUTO DIFF WBC: CPT | Performed by: INTERNAL MEDICINE

## 2021-04-22 PROCEDURE — 86160 COMPLEMENT ANTIGEN: CPT | Mod: 59 | Performed by: INTERNAL MEDICINE

## 2021-04-22 PROCEDURE — 85651 RBC SED RATE NONAUTOMATED: CPT | Performed by: INTERNAL MEDICINE

## 2021-04-22 PROCEDURE — 86160 COMPLEMENT ANTIGEN: CPT | Performed by: INTERNAL MEDICINE

## 2021-04-22 PROCEDURE — 86140 C-REACTIVE PROTEIN: CPT | Performed by: INTERNAL MEDICINE

## 2021-04-22 PROCEDURE — 80053 COMPREHEN METABOLIC PANEL: CPT | Performed by: INTERNAL MEDICINE

## 2021-04-22 PROCEDURE — 86225 DNA ANTIBODY NATIVE: CPT | Performed by: INTERNAL MEDICINE

## 2021-04-23 LAB — DSDNA AB SER-ACNC: NORMAL [IU]/ML

## 2021-04-26 ENCOUNTER — OFFICE VISIT (OUTPATIENT)
Dept: RHEUMATOLOGY | Facility: CLINIC | Age: 56
End: 2021-04-26
Payer: COMMERCIAL

## 2021-04-26 VITALS
SYSTOLIC BLOOD PRESSURE: 100 MMHG | WEIGHT: 143 LBS | BODY MASS INDEX: 23.82 KG/M2 | HEIGHT: 65 IN | HEART RATE: 71 BPM | DIASTOLIC BLOOD PRESSURE: 68 MMHG

## 2021-04-26 DIAGNOSIS — M32.9 SYSTEMIC LUPUS ERYTHEMATOSUS, UNSPECIFIED SLE TYPE, UNSPECIFIED ORGAN INVOLVEMENT STATUS: ICD-10-CM

## 2021-04-26 DIAGNOSIS — M32.19 OTHER SYSTEMIC LUPUS ERYTHEMATOSUS WITH OTHER ORGAN INVOLVEMENT: ICD-10-CM

## 2021-04-26 PROCEDURE — 99999 PR PBB SHADOW E&M-EST. PATIENT-LVL III: CPT | Mod: PBBFAC,,, | Performed by: INTERNAL MEDICINE

## 2021-04-26 PROCEDURE — 3008F BODY MASS INDEX DOCD: CPT | Mod: CPTII,S$GLB,, | Performed by: INTERNAL MEDICINE

## 2021-04-26 PROCEDURE — 99999 PR PBB SHADOW E&M-EST. PATIENT-LVL III: ICD-10-PCS | Mod: PBBFAC,,, | Performed by: INTERNAL MEDICINE

## 2021-04-26 PROCEDURE — 3008F PR BODY MASS INDEX (BMI) DOCUMENTED: ICD-10-PCS | Mod: CPTII,S$GLB,, | Performed by: INTERNAL MEDICINE

## 2021-04-26 PROCEDURE — 1125F PR PAIN SEVERITY QUANTIFIED, PAIN PRESENT: ICD-10-PCS | Mod: S$GLB,,, | Performed by: INTERNAL MEDICINE

## 2021-04-26 PROCEDURE — 99214 OFFICE O/P EST MOD 30 MIN: CPT | Mod: S$GLB,,, | Performed by: INTERNAL MEDICINE

## 2021-04-26 PROCEDURE — 99214 PR OFFICE/OUTPT VISIT, EST, LEVL IV, 30-39 MIN: ICD-10-PCS | Mod: S$GLB,,, | Performed by: INTERNAL MEDICINE

## 2021-04-26 PROCEDURE — 1125F AMNT PAIN NOTED PAIN PRSNT: CPT | Mod: S$GLB,,, | Performed by: INTERNAL MEDICINE

## 2021-04-26 RX ORDER — BELIMUMAB 200 MG/ML
SOLUTION SUBCUTANEOUS
Qty: 4 ML | Refills: 2 | Status: SHIPPED | OUTPATIENT
Start: 2021-04-26 | End: 2021-08-10 | Stop reason: SDUPTHER

## 2021-04-26 RX ORDER — AZATHIOPRINE 50 MG/1
100 TABLET ORAL DAILY
Qty: 180 TABLET | Refills: 0 | Status: SHIPPED | OUTPATIENT
Start: 2021-04-26 | End: 2021-05-07 | Stop reason: SDUPTHER

## 2021-04-26 ASSESSMENT — SYSTEMIC LUPUS ERYTHEMATOSUS DISEASE ACTIVITY INDEX (SLEDAI): TOTAL_SCORE: 0

## 2021-05-04 ENCOUNTER — PATIENT MESSAGE (OUTPATIENT)
Dept: RHEUMATOLOGY | Facility: CLINIC | Age: 56
End: 2021-05-04

## 2021-05-07 ENCOUNTER — LAB VISIT (OUTPATIENT)
Dept: LAB | Facility: HOSPITAL | Age: 56
End: 2021-05-07
Attending: INTERNAL MEDICINE
Payer: COMMERCIAL

## 2021-05-07 ENCOUNTER — TELEPHONE (OUTPATIENT)
Dept: RHEUMATOLOGY | Facility: CLINIC | Age: 56
End: 2021-05-07

## 2021-05-07 DIAGNOSIS — E83.51 HYPOCALCEMIA: ICD-10-CM

## 2021-05-07 DIAGNOSIS — M32.19 OTHER SYSTEMIC LUPUS ERYTHEMATOSUS WITH OTHER ORGAN INVOLVEMENT: ICD-10-CM

## 2021-05-07 DIAGNOSIS — M32.9 SYSTEMIC LUPUS ERYTHEMATOSUS, UNSPECIFIED SLE TYPE, UNSPECIFIED ORGAN INVOLVEMENT STATUS: ICD-10-CM

## 2021-05-07 LAB
25(OH)D3+25(OH)D2 SERPL-MCNC: 61 NG/ML (ref 30–96)
BASOPHILS # BLD AUTO: 0.04 K/UL (ref 0–0.2)
BASOPHILS NFR BLD: 1.3 % (ref 0–1.9)
CA-I BLDV-SCNC: 1.14 MMOL/L (ref 1.06–1.42)
CALCIUM SERPL-MCNC: 8.7 MG/DL (ref 8.7–10.5)
DIFFERENTIAL METHOD: ABNORMAL
EOSINOPHIL # BLD AUTO: 0.1 K/UL (ref 0–0.5)
EOSINOPHIL NFR BLD: 1.6 % (ref 0–8)
ERYTHROCYTE [DISTWIDTH] IN BLOOD BY AUTOMATED COUNT: 12.2 % (ref 11.5–14.5)
HCT VFR BLD AUTO: 41.1 % (ref 37–48.5)
HGB BLD-MCNC: 13.9 G/DL (ref 12–16)
IMM GRANULOCYTES # BLD AUTO: 0.01 K/UL (ref 0–0.04)
IMM GRANULOCYTES NFR BLD AUTO: 0.3 % (ref 0–0.5)
LYMPHOCYTES # BLD AUTO: 0.6 K/UL (ref 1–4.8)
LYMPHOCYTES NFR BLD: 18.4 % (ref 18–48)
MAGNESIUM SERPL-MCNC: 2 MG/DL (ref 1.6–2.6)
MCH RBC QN AUTO: 32.6 PG (ref 27–31)
MCHC RBC AUTO-ENTMCNC: 33.8 G/DL (ref 32–36)
MCV RBC AUTO: 97 FL (ref 82–98)
MONOCYTES # BLD AUTO: 0.5 K/UL (ref 0.3–1)
MONOCYTES NFR BLD: 14.4 % (ref 4–15)
NEUTROPHILS # BLD AUTO: 2.1 K/UL (ref 1.8–7.7)
NEUTROPHILS NFR BLD: 64 % (ref 38–73)
NRBC BLD-RTO: 0 /100 WBC
PHOSPHATE SERPL-MCNC: 2.8 MG/DL (ref 2.7–4.5)
PLATELET # BLD AUTO: 221 K/UL (ref 150–450)
PMV BLD AUTO: 10.8 FL (ref 9.2–12.9)
PTH-INTACT SERPL-MCNC: 53 PG/ML (ref 9–77)
RBC # BLD AUTO: 4.26 M/UL (ref 4–5.4)
WBC # BLD AUTO: 3.2 K/UL (ref 3.9–12.7)

## 2021-05-07 PROCEDURE — 82310 ASSAY OF CALCIUM: CPT | Performed by: INTERNAL MEDICINE

## 2021-05-07 PROCEDURE — 84100 ASSAY OF PHOSPHORUS: CPT | Performed by: INTERNAL MEDICINE

## 2021-05-07 PROCEDURE — 82330 ASSAY OF CALCIUM: CPT | Performed by: INTERNAL MEDICINE

## 2021-05-07 PROCEDURE — 85025 COMPLETE CBC W/AUTO DIFF WBC: CPT | Performed by: INTERNAL MEDICINE

## 2021-05-07 PROCEDURE — 82306 VITAMIN D 25 HYDROXY: CPT | Performed by: INTERNAL MEDICINE

## 2021-05-07 PROCEDURE — 83970 ASSAY OF PARATHORMONE: CPT | Performed by: INTERNAL MEDICINE

## 2021-05-07 PROCEDURE — 83735 ASSAY OF MAGNESIUM: CPT | Performed by: INTERNAL MEDICINE

## 2021-05-07 PROCEDURE — 36415 COLL VENOUS BLD VENIPUNCTURE: CPT | Mod: PO | Performed by: INTERNAL MEDICINE

## 2021-05-07 RX ORDER — AZATHIOPRINE 50 MG/1
TABLET ORAL
Qty: 135 TABLET | Refills: 0 | Status: SHIPPED | OUTPATIENT
Start: 2021-05-07 | End: 2021-05-12 | Stop reason: SDUPTHER

## 2021-05-10 ENCOUNTER — PATIENT MESSAGE (OUTPATIENT)
Dept: RHEUMATOLOGY | Facility: CLINIC | Age: 56
End: 2021-05-10

## 2021-05-12 DIAGNOSIS — M32.19 OTHER SYSTEMIC LUPUS ERYTHEMATOSUS WITH OTHER ORGAN INVOLVEMENT: ICD-10-CM

## 2021-05-12 RX ORDER — AZATHIOPRINE 50 MG/1
TABLET ORAL
Qty: 135 TABLET | Refills: 0 | Status: SHIPPED | OUTPATIENT
Start: 2021-05-12 | End: 2021-08-10 | Stop reason: SDUPTHER

## 2021-05-24 ENCOUNTER — LAB VISIT (OUTPATIENT)
Dept: LAB | Facility: HOSPITAL | Age: 56
End: 2021-05-24
Attending: INTERNAL MEDICINE
Payer: COMMERCIAL

## 2021-05-24 DIAGNOSIS — M32.9 SYSTEMIC LUPUS ERYTHEMATOSUS, UNSPECIFIED SLE TYPE, UNSPECIFIED ORGAN INVOLVEMENT STATUS: ICD-10-CM

## 2021-05-24 LAB
BASOPHILS # BLD AUTO: 0.04 K/UL (ref 0–0.2)
BASOPHILS NFR BLD: 1 % (ref 0–1.9)
DIFFERENTIAL METHOD: ABNORMAL
EOSINOPHIL # BLD AUTO: 0 K/UL (ref 0–0.5)
EOSINOPHIL NFR BLD: 1 % (ref 0–8)
ERYTHROCYTE [DISTWIDTH] IN BLOOD BY AUTOMATED COUNT: 12.5 % (ref 11.5–14.5)
HCT VFR BLD AUTO: 41.8 % (ref 37–48.5)
HGB BLD-MCNC: 13.7 G/DL (ref 12–16)
IMM GRANULOCYTES # BLD AUTO: 0.02 K/UL (ref 0–0.04)
IMM GRANULOCYTES NFR BLD AUTO: 0.5 % (ref 0–0.5)
LYMPHOCYTES # BLD AUTO: 0.6 K/UL (ref 1–4.8)
LYMPHOCYTES NFR BLD: 16 % (ref 18–48)
MCH RBC QN AUTO: 31.6 PG (ref 27–31)
MCHC RBC AUTO-ENTMCNC: 32.8 G/DL (ref 32–36)
MCV RBC AUTO: 97 FL (ref 82–98)
MONOCYTES # BLD AUTO: 0.6 K/UL (ref 0.3–1)
MONOCYTES NFR BLD: 14.2 % (ref 4–15)
NEUTROPHILS # BLD AUTO: 2.7 K/UL (ref 1.8–7.7)
NEUTROPHILS NFR BLD: 67.3 % (ref 38–73)
NRBC BLD-RTO: 0 /100 WBC
PLATELET # BLD AUTO: 214 K/UL (ref 150–450)
PMV BLD AUTO: 10.7 FL (ref 9.2–12.9)
RBC # BLD AUTO: 4.33 M/UL (ref 4–5.4)
WBC # BLD AUTO: 3.94 K/UL (ref 3.9–12.7)

## 2021-05-24 PROCEDURE — 36415 COLL VENOUS BLD VENIPUNCTURE: CPT | Mod: PO | Performed by: INTERNAL MEDICINE

## 2021-05-24 PROCEDURE — 85025 COMPLETE CBC W/AUTO DIFF WBC: CPT | Performed by: INTERNAL MEDICINE

## 2021-05-28 ENCOUNTER — TELEPHONE (OUTPATIENT)
Dept: OTOLARYNGOLOGY | Facility: CLINIC | Age: 56
End: 2021-05-28

## 2021-06-08 ENCOUNTER — PATIENT MESSAGE (OUTPATIENT)
Dept: RHEUMATOLOGY | Facility: CLINIC | Age: 56
End: 2021-06-08

## 2021-07-13 ENCOUNTER — PATIENT MESSAGE (OUTPATIENT)
Dept: RHEUMATOLOGY | Facility: CLINIC | Age: 56
End: 2021-07-13

## 2021-07-13 ENCOUNTER — PATIENT MESSAGE (OUTPATIENT)
Dept: OTOLARYNGOLOGY | Facility: CLINIC | Age: 56
End: 2021-07-13

## 2021-07-20 ENCOUNTER — OFFICE VISIT (OUTPATIENT)
Dept: NEUROLOGY | Facility: CLINIC | Age: 56
End: 2021-07-20
Payer: COMMERCIAL

## 2021-07-20 VITALS
RESPIRATION RATE: 17 BRPM | WEIGHT: 139.13 LBS | SYSTOLIC BLOOD PRESSURE: 107 MMHG | DIASTOLIC BLOOD PRESSURE: 74 MMHG | HEIGHT: 64 IN | HEART RATE: 80 BPM | BODY MASS INDEX: 23.75 KG/M2 | TEMPERATURE: 98 F

## 2021-07-20 DIAGNOSIS — F41.9 ANXIETY: ICD-10-CM

## 2021-07-20 DIAGNOSIS — G43.809 VESTIBULAR MIGRAINE: Primary | ICD-10-CM

## 2021-07-20 PROCEDURE — 3008F BODY MASS INDEX DOCD: CPT | Mod: CPTII,S$GLB,, | Performed by: PSYCHIATRY & NEUROLOGY

## 2021-07-20 PROCEDURE — 99205 PR OFFICE/OUTPT VISIT, NEW, LEVL V, 60-74 MIN: ICD-10-PCS | Mod: S$GLB,,, | Performed by: PSYCHIATRY & NEUROLOGY

## 2021-07-20 PROCEDURE — 99205 OFFICE O/P NEW HI 60 MIN: CPT | Mod: S$GLB,,, | Performed by: PSYCHIATRY & NEUROLOGY

## 2021-07-20 PROCEDURE — 99417 PR PROLONGED SVC, OUTPT, W/WO DIRECT PT CONTACT,  EA ADDTL 15 MIN: ICD-10-PCS | Mod: S$GLB,,, | Performed by: PSYCHIATRY & NEUROLOGY

## 2021-07-20 PROCEDURE — 1126F AMNT PAIN NOTED NONE PRSNT: CPT | Mod: CPTII,S$GLB,, | Performed by: PSYCHIATRY & NEUROLOGY

## 2021-07-20 PROCEDURE — 3008F PR BODY MASS INDEX (BMI) DOCUMENTED: ICD-10-PCS | Mod: CPTII,S$GLB,, | Performed by: PSYCHIATRY & NEUROLOGY

## 2021-07-20 PROCEDURE — 99999 PR PBB SHADOW E&M-EST. PATIENT-LVL V: CPT | Mod: PBBFAC,,, | Performed by: PSYCHIATRY & NEUROLOGY

## 2021-07-20 PROCEDURE — 99999 PR PBB SHADOW E&M-EST. PATIENT-LVL V: ICD-10-PCS | Mod: PBBFAC,,, | Performed by: PSYCHIATRY & NEUROLOGY

## 2021-07-20 PROCEDURE — 1126F PR PAIN SEVERITY QUANTIFIED, NO PAIN PRESENT: ICD-10-PCS | Mod: CPTII,S$GLB,, | Performed by: PSYCHIATRY & NEUROLOGY

## 2021-07-20 PROCEDURE — 99417 PROLNG OP E/M EACH 15 MIN: CPT | Mod: S$GLB,,, | Performed by: PSYCHIATRY & NEUROLOGY

## 2021-07-20 RX ORDER — SUMATRIPTAN 20 MG/1
1 SPRAY NASAL
Qty: 12 EACH | Refills: 3 | Status: SHIPPED | OUTPATIENT
Start: 2021-07-20 | End: 2022-05-26

## 2021-07-20 RX ORDER — VENLAFAXINE HYDROCHLORIDE 37.5 MG/1
CAPSULE, EXTENDED RELEASE ORAL
Qty: 60 CAPSULE | Refills: 6 | Status: SHIPPED | OUTPATIENT
Start: 2021-07-20 | End: 2021-10-28

## 2021-07-20 RX ORDER — PROCHLORPERAZINE 25 MG
25 SUPPOSITORY, RECTAL RECTAL EVERY 12 HOURS PRN
Qty: 12 SUPPOSITORY | Refills: 3 | Status: SHIPPED | OUTPATIENT
Start: 2021-07-20 | End: 2022-07-20

## 2021-07-20 RX ORDER — PROCHLORPERAZINE MALEATE 10 MG
10 TABLET ORAL 3 TIMES DAILY PRN
Qty: 12 TABLET | Refills: 6 | Status: SHIPPED | OUTPATIENT
Start: 2021-07-20 | End: 2022-05-26

## 2021-07-23 ENCOUNTER — TELEPHONE (OUTPATIENT)
Dept: NEUROLOGY | Facility: CLINIC | Age: 56
End: 2021-07-23

## 2021-07-23 ENCOUNTER — PATIENT MESSAGE (OUTPATIENT)
Dept: RHEUMATOLOGY | Facility: CLINIC | Age: 56
End: 2021-07-23

## 2021-07-23 ENCOUNTER — TELEPHONE (OUTPATIENT)
Dept: RHEUMATOLOGY | Facility: CLINIC | Age: 56
End: 2021-07-23

## 2021-07-23 ENCOUNTER — PATIENT MESSAGE (OUTPATIENT)
Dept: NEUROLOGY | Facility: CLINIC | Age: 56
End: 2021-07-23

## 2021-07-23 DIAGNOSIS — R51.9 NONINTRACTABLE HEADACHE, UNSPECIFIED CHRONICITY PATTERN, UNSPECIFIED HEADACHE TYPE: Primary | ICD-10-CM

## 2021-07-23 NOTE — TELEPHONE ENCOUNTER
----- Message from Sierra Hagen sent at 7/23/2021  8:41 AM CDT -----  Regarding: REFERRAL  Contact:  - Zacarias Nunez  Pt is being referred to Dr Millan by Dr Bender for migraine vertigo  stated have been waiting since 7/14/2021 for someone to call to schedule an appointment however no one has called as of yet Pt have been ill during all this time  ask for a call      Contact info  [phone]    No solution found before the template release date of 11/30/2021.

## 2021-07-23 NOTE — TELEPHONE ENCOUNTER
"Spoke with pt's -    Explained that Dr. Millan has no available appointments until his December schedule is released. I advised pt's  to contact Dr. Bender to advise him of this situation and he replied "He's knows what's going on with my wife, but she cannot wait until December".   "

## 2021-07-23 NOTE — TELEPHONE ENCOUNTER
----- Message from Liv Morerll MA sent at 7/23/2021 10:09 AM CDT -----  Good morning,  dr ortiz  would like nanci to see this patient for headaches.   Patient is requesting to see dr christine,  I think she has seen some one else.   She is having serve ones.  Can not function.   Thank you liv

## 2021-08-02 ENCOUNTER — LAB VISIT (OUTPATIENT)
Dept: LAB | Facility: HOSPITAL | Age: 56
End: 2021-08-02
Attending: INTERNAL MEDICINE
Payer: COMMERCIAL

## 2021-08-02 DIAGNOSIS — M32.9 SYSTEMIC LUPUS ERYTHEMATOSUS, UNSPECIFIED SLE TYPE, UNSPECIFIED ORGAN INVOLVEMENT STATUS: ICD-10-CM

## 2021-08-02 LAB
BASOPHILS # BLD AUTO: 0.03 K/UL (ref 0–0.2)
BASOPHILS NFR BLD: 0.8 % (ref 0–1.9)
DIFFERENTIAL METHOD: ABNORMAL
EOSINOPHIL # BLD AUTO: 0 K/UL (ref 0–0.5)
EOSINOPHIL NFR BLD: 1.1 % (ref 0–8)
ERYTHROCYTE [DISTWIDTH] IN BLOOD BY AUTOMATED COUNT: 12.4 % (ref 11.5–14.5)
ERYTHROCYTE [SEDIMENTATION RATE] IN BLOOD BY WESTERGREN METHOD: <2 MM/HR (ref 0–36)
HCT VFR BLD AUTO: 45.2 % (ref 37–48.5)
HGB BLD-MCNC: 15.2 G/DL (ref 12–16)
IMM GRANULOCYTES # BLD AUTO: 0.01 K/UL (ref 0–0.04)
IMM GRANULOCYTES NFR BLD AUTO: 0.3 % (ref 0–0.5)
LYMPHOCYTES # BLD AUTO: 0.7 K/UL (ref 1–4.8)
LYMPHOCYTES NFR BLD: 18.2 % (ref 18–48)
MCH RBC QN AUTO: 33 PG (ref 27–31)
MCHC RBC AUTO-ENTMCNC: 33.6 G/DL (ref 32–36)
MCV RBC AUTO: 98 FL (ref 82–98)
MONOCYTES # BLD AUTO: 0.4 K/UL (ref 0.3–1)
MONOCYTES NFR BLD: 11.2 % (ref 4–15)
NEUTROPHILS # BLD AUTO: 2.6 K/UL (ref 1.8–7.7)
NEUTROPHILS NFR BLD: 68.4 % (ref 38–73)
NRBC BLD-RTO: 0 /100 WBC
PLATELET # BLD AUTO: 218 K/UL (ref 150–450)
PMV BLD AUTO: 10.7 FL (ref 9.2–12.9)
RBC # BLD AUTO: 4.6 M/UL (ref 4–5.4)
WBC # BLD AUTO: 3.74 K/UL (ref 3.9–12.7)

## 2021-08-02 PROCEDURE — 85025 COMPLETE CBC W/AUTO DIFF WBC: CPT | Performed by: INTERNAL MEDICINE

## 2021-08-02 PROCEDURE — 86160 COMPLEMENT ANTIGEN: CPT | Mod: 59 | Performed by: INTERNAL MEDICINE

## 2021-08-02 PROCEDURE — 36415 COLL VENOUS BLD VENIPUNCTURE: CPT | Mod: PO | Performed by: INTERNAL MEDICINE

## 2021-08-02 PROCEDURE — 86225 DNA ANTIBODY NATIVE: CPT | Performed by: INTERNAL MEDICINE

## 2021-08-02 PROCEDURE — 85652 RBC SED RATE AUTOMATED: CPT | Performed by: INTERNAL MEDICINE

## 2021-08-02 PROCEDURE — 80053 COMPREHEN METABOLIC PANEL: CPT | Performed by: INTERNAL MEDICINE

## 2021-08-02 PROCEDURE — 86140 C-REACTIVE PROTEIN: CPT | Performed by: INTERNAL MEDICINE

## 2021-08-02 PROCEDURE — 86160 COMPLEMENT ANTIGEN: CPT | Performed by: INTERNAL MEDICINE

## 2021-08-03 ENCOUNTER — TELEPHONE (OUTPATIENT)
Dept: RHEUMATOLOGY | Facility: CLINIC | Age: 56
End: 2021-08-03

## 2021-08-03 LAB
ALBUMIN SERPL BCP-MCNC: 4.2 G/DL (ref 3.5–5.2)
ALP SERPL-CCNC: 66 U/L (ref 55–135)
ALT SERPL W/O P-5'-P-CCNC: 19 U/L (ref 10–44)
ANION GAP SERPL CALC-SCNC: 11 MMOL/L (ref 8–16)
AST SERPL-CCNC: 16 U/L (ref 10–40)
BILIRUB SERPL-MCNC: 0.5 MG/DL (ref 0.1–1)
BUN SERPL-MCNC: 12 MG/DL (ref 6–20)
C3 SERPL-MCNC: 90 MG/DL (ref 50–180)
C4 SERPL-MCNC: 14 MG/DL (ref 11–44)
CALCIUM SERPL-MCNC: 9.6 MG/DL (ref 8.7–10.5)
CHLORIDE SERPL-SCNC: 104 MMOL/L (ref 95–110)
CO2 SERPL-SCNC: 24 MMOL/L (ref 23–29)
CREAT SERPL-MCNC: 0.8 MG/DL (ref 0.5–1.4)
CRP SERPL-MCNC: 0.2 MG/L (ref 0–8.2)
DSDNA AB SER-ACNC: NORMAL [IU]/ML
EST. GFR  (AFRICAN AMERICAN): >60 ML/MIN/1.73 M^2
EST. GFR  (NON AFRICAN AMERICAN): >60 ML/MIN/1.73 M^2
GLUCOSE SERPL-MCNC: 77 MG/DL (ref 70–110)
POTASSIUM SERPL-SCNC: 4.2 MMOL/L (ref 3.5–5.1)
PROT SERPL-MCNC: 7.4 G/DL (ref 6–8.4)
SODIUM SERPL-SCNC: 139 MMOL/L (ref 136–145)

## 2021-08-04 ENCOUNTER — TELEPHONE (OUTPATIENT)
Dept: OPHTHALMOLOGY | Facility: CLINIC | Age: 56
End: 2021-08-04

## 2021-08-05 ENCOUNTER — PATIENT MESSAGE (OUTPATIENT)
Dept: RHEUMATOLOGY | Facility: CLINIC | Age: 56
End: 2021-08-05

## 2021-08-10 ENCOUNTER — OFFICE VISIT (OUTPATIENT)
Dept: RHEUMATOLOGY | Facility: CLINIC | Age: 56
End: 2021-08-10
Payer: COMMERCIAL

## 2021-08-10 ENCOUNTER — PATIENT MESSAGE (OUTPATIENT)
Dept: RHEUMATOLOGY | Facility: CLINIC | Age: 56
End: 2021-08-10

## 2021-08-10 DIAGNOSIS — M32.9 SYSTEMIC LUPUS ERYTHEMATOSUS, UNSPECIFIED SLE TYPE, UNSPECIFIED ORGAN INVOLVEMENT STATUS: ICD-10-CM

## 2021-08-10 DIAGNOSIS — M32.19 OTHER SYSTEMIC LUPUS ERYTHEMATOSUS WITH OTHER ORGAN INVOLVEMENT: ICD-10-CM

## 2021-08-10 PROCEDURE — 1159F PR MEDICATION LIST DOCUMENTED IN MEDICAL RECORD: ICD-10-PCS | Mod: CPTII,,, | Performed by: INTERNAL MEDICINE

## 2021-08-10 PROCEDURE — 99213 PR OFFICE/OUTPT VISIT, EST, LEVL III, 20-29 MIN: ICD-10-PCS | Mod: 95,,, | Performed by: INTERNAL MEDICINE

## 2021-08-10 PROCEDURE — 1125F PR PAIN SEVERITY QUANTIFIED, PAIN PRESENT: ICD-10-PCS | Mod: CPTII,,, | Performed by: INTERNAL MEDICINE

## 2021-08-10 PROCEDURE — 99213 OFFICE O/P EST LOW 20 MIN: CPT | Mod: 95,,, | Performed by: INTERNAL MEDICINE

## 2021-08-10 PROCEDURE — 1125F AMNT PAIN NOTED PAIN PRSNT: CPT | Mod: CPTII,,, | Performed by: INTERNAL MEDICINE

## 2021-08-10 PROCEDURE — 1159F MED LIST DOCD IN RCRD: CPT | Mod: CPTII,,, | Performed by: INTERNAL MEDICINE

## 2021-08-10 RX ORDER — AZATHIOPRINE 50 MG/1
TABLET ORAL
Qty: 135 TABLET | Refills: 0 | Status: SHIPPED | OUTPATIENT
Start: 2021-08-10 | End: 2021-11-29 | Stop reason: SDUPTHER

## 2021-08-10 RX ORDER — BELIMUMAB 200 MG/ML
SOLUTION SUBCUTANEOUS
Qty: 4 ML | Refills: 2 | Status: SHIPPED | OUTPATIENT
Start: 2021-08-10 | End: 2021-10-08 | Stop reason: SDUPTHER

## 2021-08-10 ASSESSMENT — SYSTEMIC LUPUS ERYTHEMATOSUS DISEASE ACTIVITY INDEX (SLEDAI): TOTAL_SCORE: 0

## 2021-08-16 ENCOUNTER — PATIENT MESSAGE (OUTPATIENT)
Dept: RHEUMATOLOGY | Facility: CLINIC | Age: 56
End: 2021-08-16

## 2021-09-12 ENCOUNTER — PATIENT MESSAGE (OUTPATIENT)
Dept: RHEUMATOLOGY | Facility: CLINIC | Age: 56
End: 2021-09-12

## 2021-09-13 ENCOUNTER — TELEPHONE (OUTPATIENT)
Dept: RHEUMATOLOGY | Facility: CLINIC | Age: 56
End: 2021-09-13

## 2021-10-07 ENCOUNTER — OFFICE VISIT (OUTPATIENT)
Dept: OBSTETRICS AND GYNECOLOGY | Facility: CLINIC | Age: 56
End: 2021-10-07
Payer: COMMERCIAL

## 2021-10-07 VITALS
BODY MASS INDEX: 23.45 KG/M2 | HEIGHT: 64 IN | DIASTOLIC BLOOD PRESSURE: 60 MMHG | SYSTOLIC BLOOD PRESSURE: 98 MMHG | WEIGHT: 137.38 LBS

## 2021-10-07 DIAGNOSIS — N88.9 CERVICAL LESION: ICD-10-CM

## 2021-10-07 DIAGNOSIS — N95.0 PMB (POSTMENOPAUSAL BLEEDING): Primary | ICD-10-CM

## 2021-10-07 PROCEDURE — 1159F MED LIST DOCD IN RCRD: CPT | Mod: CPTII,S$GLB,, | Performed by: OBSTETRICS & GYNECOLOGY

## 2021-10-07 PROCEDURE — 88141 CYTOPATH C/V INTERPRET: CPT | Mod: ,,, | Performed by: PATHOLOGY

## 2021-10-07 PROCEDURE — 3074F SYST BP LT 130 MM HG: CPT | Mod: CPTII,S$GLB,, | Performed by: OBSTETRICS & GYNECOLOGY

## 2021-10-07 PROCEDURE — 99999 PR PBB SHADOW E&M-EST. PATIENT-LVL IV: ICD-10-PCS | Mod: PBBFAC,,, | Performed by: OBSTETRICS & GYNECOLOGY

## 2021-10-07 PROCEDURE — 1159F PR MEDICATION LIST DOCUMENTED IN MEDICAL RECORD: ICD-10-PCS | Mod: CPTII,S$GLB,, | Performed by: OBSTETRICS & GYNECOLOGY

## 2021-10-07 PROCEDURE — 57500 BIOPSY OF CERVIX: CPT | Mod: S$GLB,,, | Performed by: OBSTETRICS & GYNECOLOGY

## 2021-10-07 PROCEDURE — 99213 OFFICE O/P EST LOW 20 MIN: CPT | Mod: 25,S$GLB,, | Performed by: OBSTETRICS & GYNECOLOGY

## 2021-10-07 PROCEDURE — 3008F BODY MASS INDEX DOCD: CPT | Mod: CPTII,S$GLB,, | Performed by: OBSTETRICS & GYNECOLOGY

## 2021-10-07 PROCEDURE — 99213 PR OFFICE/OUTPT VISIT, EST, LEVL III, 20-29 MIN: ICD-10-PCS | Mod: 25,S$GLB,, | Performed by: OBSTETRICS & GYNECOLOGY

## 2021-10-07 PROCEDURE — 88141 PR  CYTOPATH CERV/VAG INTERPRET: ICD-10-PCS | Mod: ,,, | Performed by: PATHOLOGY

## 2021-10-07 PROCEDURE — 3078F PR MOST RECENT DIASTOLIC BLOOD PRESSURE < 80 MM HG: ICD-10-PCS | Mod: CPTII,S$GLB,, | Performed by: OBSTETRICS & GYNECOLOGY

## 2021-10-07 PROCEDURE — 88305 TISSUE EXAM BY PATHOLOGIST: CPT | Mod: 26,,, | Performed by: PATHOLOGY

## 2021-10-07 PROCEDURE — 3008F PR BODY MASS INDEX (BMI) DOCUMENTED: ICD-10-PCS | Mod: CPTII,S$GLB,, | Performed by: OBSTETRICS & GYNECOLOGY

## 2021-10-07 PROCEDURE — 3074F PR MOST RECENT SYSTOLIC BLOOD PRESSURE < 130 MM HG: ICD-10-PCS | Mod: CPTII,S$GLB,, | Performed by: OBSTETRICS & GYNECOLOGY

## 2021-10-07 PROCEDURE — 88305 TISSUE EXAM BY PATHOLOGIST: ICD-10-PCS | Mod: 26,,, | Performed by: PATHOLOGY

## 2021-10-07 PROCEDURE — 3078F DIAST BP <80 MM HG: CPT | Mod: CPTII,S$GLB,, | Performed by: OBSTETRICS & GYNECOLOGY

## 2021-10-07 PROCEDURE — 99999 PR PBB SHADOW E&M-EST. PATIENT-LVL IV: CPT | Mod: PBBFAC,,, | Performed by: OBSTETRICS & GYNECOLOGY

## 2021-10-07 PROCEDURE — 88305 TISSUE EXAM BY PATHOLOGIST: CPT | Performed by: PATHOLOGY

## 2021-10-07 PROCEDURE — 57500 PR BIOPSY CERVIX, 1 OR MORE, OR EXCISION OF LESION: ICD-10-PCS | Mod: S$GLB,,, | Performed by: OBSTETRICS & GYNECOLOGY

## 2021-10-07 PROCEDURE — 87624 HPV HI-RISK TYP POOLED RSLT: CPT | Performed by: OBSTETRICS & GYNECOLOGY

## 2021-10-07 PROCEDURE — 88175 CYTOPATH C/V AUTO FLUID REDO: CPT | Performed by: PATHOLOGY

## 2021-10-08 ENCOUNTER — SPECIALTY PHARMACY (OUTPATIENT)
Dept: PHARMACY | Facility: CLINIC | Age: 56
End: 2021-10-08
Payer: COMMERCIAL

## 2021-10-08 DIAGNOSIS — M32.19 OTHER SYSTEMIC LUPUS ERYTHEMATOSUS WITH OTHER ORGAN INVOLVEMENT: Primary | ICD-10-CM

## 2021-10-08 DIAGNOSIS — M32.9 SYSTEMIC LUPUS ERYTHEMATOSUS, UNSPECIFIED SLE TYPE, UNSPECIFIED ORGAN INVOLVEMENT STATUS: ICD-10-CM

## 2021-10-08 RX ORDER — BELIMUMAB 200 MG/ML
SOLUTION SUBCUTANEOUS
Qty: 4 ML | Refills: 2 | OUTPATIENT
Start: 2021-10-08 | End: 2021-10-18 | Stop reason: SDUPTHER

## 2021-10-11 ENCOUNTER — HOSPITAL ENCOUNTER (OUTPATIENT)
Dept: RADIOLOGY | Facility: HOSPITAL | Age: 56
Discharge: HOME | End: 2021-10-11
Attending: OBSTETRICS & GYNECOLOGY
Payer: COMMERCIAL

## 2021-10-11 DIAGNOSIS — N95.0 PMB (POSTMENOPAUSAL BLEEDING): ICD-10-CM

## 2021-10-11 PROCEDURE — 76830 TRANSVAGINAL US NON-OB: CPT | Mod: 26,,, | Performed by: RADIOLOGY

## 2021-10-11 PROCEDURE — 76856 US EXAM PELVIC COMPLETE: CPT | Mod: 26,,, | Performed by: RADIOLOGY

## 2021-10-11 PROCEDURE — 76856 US EXAM PELVIC COMPLETE: CPT | Mod: TC

## 2021-10-11 PROCEDURE — 76856 US PELVIS COMP WITH TRANSVAG NON-OB (XPD): ICD-10-PCS | Mod: 26,,, | Performed by: RADIOLOGY

## 2021-10-11 PROCEDURE — 76830 US PELVIS COMP WITH TRANSVAG NON-OB (XPD): ICD-10-PCS | Mod: 26,,, | Performed by: RADIOLOGY

## 2021-10-12 ENCOUNTER — TELEPHONE (OUTPATIENT)
Dept: RHEUMATOLOGY | Facility: CLINIC | Age: 56
End: 2021-10-12

## 2021-10-12 LAB
FINAL PATHOLOGIC DIAGNOSIS: NORMAL
GROSS: NORMAL
Lab: NORMAL

## 2021-10-14 ENCOUNTER — PATIENT MESSAGE (OUTPATIENT)
Dept: RHEUMATOLOGY | Facility: CLINIC | Age: 56
End: 2021-10-14
Payer: COMMERCIAL

## 2021-10-14 LAB
HPV HR 12 DNA SPEC QL NAA+PROBE: NEGATIVE
HPV16 AG SPEC QL: NEGATIVE
HPV18 DNA SPEC QL NAA+PROBE: NEGATIVE

## 2021-10-17 ENCOUNTER — TELEPHONE (OUTPATIENT)
Dept: OBSTETRICS AND GYNECOLOGY | Facility: CLINIC | Age: 56
End: 2021-10-17

## 2021-10-18 ENCOUNTER — PATIENT MESSAGE (OUTPATIENT)
Dept: RHEUMATOLOGY | Facility: CLINIC | Age: 56
End: 2021-10-18
Payer: COMMERCIAL

## 2021-10-18 ENCOUNTER — TELEPHONE (OUTPATIENT)
Dept: RHEUMATOLOGY | Facility: CLINIC | Age: 56
End: 2021-10-18

## 2021-10-18 DIAGNOSIS — M32.9 SYSTEMIC LUPUS ERYTHEMATOSUS, UNSPECIFIED SLE TYPE, UNSPECIFIED ORGAN INVOLVEMENT STATUS: ICD-10-CM

## 2021-10-18 LAB
FINAL PATHOLOGIC DIAGNOSIS: ABNORMAL
Lab: ABNORMAL

## 2021-10-18 RX ORDER — BELIMUMAB 200 MG/ML
SOLUTION SUBCUTANEOUS
Qty: 4 ML | Refills: 2 | Status: SHIPPED | OUTPATIENT
Start: 2021-10-18 | End: 2021-12-16

## 2021-10-28 ENCOUNTER — PROCEDURE VISIT (OUTPATIENT)
Dept: OBSTETRICS AND GYNECOLOGY | Facility: CLINIC | Age: 56
End: 2021-10-28
Payer: COMMERCIAL

## 2021-10-28 VITALS
WEIGHT: 138.25 LBS | HEIGHT: 64 IN | SYSTOLIC BLOOD PRESSURE: 90 MMHG | DIASTOLIC BLOOD PRESSURE: 60 MMHG | BODY MASS INDEX: 23.6 KG/M2

## 2021-10-28 DIAGNOSIS — N92.6 IRREGULAR MENSES: Primary | ICD-10-CM

## 2021-10-28 PROCEDURE — 99213 OFFICE O/P EST LOW 20 MIN: CPT | Mod: S$GLB,,, | Performed by: OBSTETRICS & GYNECOLOGY

## 2021-10-28 PROCEDURE — 99213 PR OFFICE/OUTPT VISIT, EST, LEVL III, 20-29 MIN: ICD-10-PCS | Mod: S$GLB,,, | Performed by: OBSTETRICS & GYNECOLOGY

## 2021-10-29 ENCOUNTER — TELEPHONE (OUTPATIENT)
Dept: OBSTETRICS AND GYNECOLOGY | Facility: CLINIC | Age: 56
End: 2021-10-29
Payer: COMMERCIAL

## 2021-10-29 DIAGNOSIS — N95.0 PMB (POSTMENOPAUSAL BLEEDING): Primary | ICD-10-CM

## 2021-11-01 ENCOUNTER — PATIENT MESSAGE (OUTPATIENT)
Dept: SURGERY | Facility: HOSPITAL | Age: 56
End: 2021-11-01
Payer: COMMERCIAL

## 2021-11-23 ENCOUNTER — LAB VISIT (OUTPATIENT)
Dept: LAB | Facility: HOSPITAL | Age: 56
End: 2021-11-23
Attending: INTERNAL MEDICINE
Payer: COMMERCIAL

## 2021-11-23 DIAGNOSIS — M32.9 SYSTEMIC LUPUS ERYTHEMATOSUS, UNSPECIFIED SLE TYPE, UNSPECIFIED ORGAN INVOLVEMENT STATUS: ICD-10-CM

## 2021-11-23 LAB
ALBUMIN SERPL BCP-MCNC: 3.9 G/DL (ref 3.5–5.2)
ALP SERPL-CCNC: 67 U/L (ref 55–135)
ALT SERPL W/O P-5'-P-CCNC: 13 U/L (ref 10–44)
ANION GAP SERPL CALC-SCNC: 6 MMOL/L (ref 8–16)
AST SERPL-CCNC: 19 U/L (ref 10–40)
BASOPHILS # BLD AUTO: 0.03 K/UL (ref 0–0.2)
BASOPHILS NFR BLD: 0.7 % (ref 0–1.9)
BILIRUB SERPL-MCNC: 0.7 MG/DL (ref 0.1–1)
BUN SERPL-MCNC: 14 MG/DL (ref 6–20)
C3 SERPL-MCNC: 96 MG/DL (ref 50–180)
C4 SERPL-MCNC: 15 MG/DL (ref 11–44)
CALCIUM SERPL-MCNC: 9 MG/DL (ref 8.7–10.5)
CHLORIDE SERPL-SCNC: 104 MMOL/L (ref 95–110)
CO2 SERPL-SCNC: 28 MMOL/L (ref 23–29)
CREAT SERPL-MCNC: 0.8 MG/DL (ref 0.5–1.4)
CRP SERPL-MCNC: 0.4 MG/L (ref 0–8.2)
DIFFERENTIAL METHOD: ABNORMAL
EOSINOPHIL # BLD AUTO: 0.1 K/UL (ref 0–0.5)
EOSINOPHIL NFR BLD: 1.6 % (ref 0–8)
ERYTHROCYTE [DISTWIDTH] IN BLOOD BY AUTOMATED COUNT: 12.4 % (ref 11.5–14.5)
ERYTHROCYTE [SEDIMENTATION RATE] IN BLOOD BY WESTERGREN METHOD: 1 MM/HR (ref 0–20)
EST. GFR  (AFRICAN AMERICAN): >60 ML/MIN/1.73 M^2
EST. GFR  (NON AFRICAN AMERICAN): >60 ML/MIN/1.73 M^2
GLUCOSE SERPL-MCNC: 79 MG/DL (ref 70–110)
HCT VFR BLD AUTO: 44.4 % (ref 37–48.5)
HGB BLD-MCNC: 14.7 G/DL (ref 12–16)
IMM GRANULOCYTES # BLD AUTO: 0.01 K/UL (ref 0–0.04)
IMM GRANULOCYTES NFR BLD AUTO: 0.2 % (ref 0–0.5)
LYMPHOCYTES # BLD AUTO: 0.7 K/UL (ref 1–4.8)
LYMPHOCYTES NFR BLD: 15.8 % (ref 18–48)
MCH RBC QN AUTO: 32.5 PG (ref 27–31)
MCHC RBC AUTO-ENTMCNC: 33.1 G/DL (ref 32–36)
MCV RBC AUTO: 98 FL (ref 82–98)
MONOCYTES # BLD AUTO: 0.6 K/UL (ref 0.3–1)
MONOCYTES NFR BLD: 13.8 % (ref 4–15)
NEUTROPHILS # BLD AUTO: 3.1 K/UL (ref 1.8–7.7)
NEUTROPHILS NFR BLD: 67.9 % (ref 38–73)
NRBC BLD-RTO: 0 /100 WBC
PLATELET # BLD AUTO: 256 K/UL (ref 150–450)
PMV BLD AUTO: 10.3 FL (ref 9.2–12.9)
POTASSIUM SERPL-SCNC: 4.3 MMOL/L (ref 3.5–5.1)
PROT SERPL-MCNC: 6.6 G/DL (ref 6–8.4)
RBC # BLD AUTO: 4.52 M/UL (ref 4–5.4)
SODIUM SERPL-SCNC: 138 MMOL/L (ref 136–145)
WBC # BLD AUTO: 4.49 K/UL (ref 3.9–12.7)

## 2021-11-23 PROCEDURE — 86225 DNA ANTIBODY NATIVE: CPT | Performed by: INTERNAL MEDICINE

## 2021-11-23 PROCEDURE — 85651 RBC SED RATE NONAUTOMATED: CPT | Performed by: INTERNAL MEDICINE

## 2021-11-23 PROCEDURE — 36415 COLL VENOUS BLD VENIPUNCTURE: CPT | Mod: PO | Performed by: INTERNAL MEDICINE

## 2021-11-23 PROCEDURE — 86160 COMPLEMENT ANTIGEN: CPT | Performed by: INTERNAL MEDICINE

## 2021-11-23 PROCEDURE — 86160 COMPLEMENT ANTIGEN: CPT | Mod: 59 | Performed by: INTERNAL MEDICINE

## 2021-11-23 PROCEDURE — 80053 COMPREHEN METABOLIC PANEL: CPT | Performed by: INTERNAL MEDICINE

## 2021-11-23 PROCEDURE — 85025 COMPLETE CBC W/AUTO DIFF WBC: CPT | Performed by: INTERNAL MEDICINE

## 2021-11-23 PROCEDURE — 86140 C-REACTIVE PROTEIN: CPT | Performed by: INTERNAL MEDICINE

## 2021-11-24 LAB — DSDNA AB SER-ACNC: NORMAL [IU]/ML

## 2021-11-29 DIAGNOSIS — M32.19 OTHER SYSTEMIC LUPUS ERYTHEMATOSUS WITH OTHER ORGAN INVOLVEMENT: ICD-10-CM

## 2021-11-29 RX ORDER — AZATHIOPRINE 50 MG/1
TABLET ORAL
Qty: 135 TABLET | Refills: 0 | Status: SHIPPED | OUTPATIENT
Start: 2021-11-29 | End: 2022-02-04

## 2021-12-08 ENCOUNTER — OFFICE VISIT (OUTPATIENT)
Dept: OBSTETRICS AND GYNECOLOGY | Facility: CLINIC | Age: 56
End: 2021-12-08
Payer: COMMERCIAL

## 2021-12-08 VITALS
BODY MASS INDEX: 24.1 KG/M2 | WEIGHT: 141.13 LBS | HEIGHT: 64 IN | SYSTOLIC BLOOD PRESSURE: 120 MMHG | DIASTOLIC BLOOD PRESSURE: 70 MMHG

## 2021-12-08 DIAGNOSIS — N88.2 CERVICAL STENOSIS (UTERINE CERVIX): ICD-10-CM

## 2021-12-08 DIAGNOSIS — N95.0 POSTMENOPAUSAL BLEEDING: Primary | ICD-10-CM

## 2021-12-08 PROCEDURE — 99213 PR OFFICE/OUTPT VISIT, EST, LEVL III, 20-29 MIN: ICD-10-PCS | Mod: S$GLB,,, | Performed by: OBSTETRICS & GYNECOLOGY

## 2021-12-08 PROCEDURE — 99999 PR PBB SHADOW E&M-EST. PATIENT-LVL III: CPT | Mod: PBBFAC,,, | Performed by: OBSTETRICS & GYNECOLOGY

## 2021-12-08 PROCEDURE — 99213 OFFICE O/P EST LOW 20 MIN: CPT | Mod: S$GLB,,, | Performed by: OBSTETRICS & GYNECOLOGY

## 2021-12-08 PROCEDURE — 99999 PR PBB SHADOW E&M-EST. PATIENT-LVL III: ICD-10-PCS | Mod: PBBFAC,,, | Performed by: OBSTETRICS & GYNECOLOGY

## 2021-12-08 RX ORDER — MISOPROSTOL 200 UG/1
600 TABLET ORAL ONCE
Qty: 3 TABLET | Refills: 0 | Status: SHIPPED | OUTPATIENT
Start: 2021-12-08 | End: 2022-05-26

## 2021-12-08 RX ORDER — SODIUM CHLORIDE 9 MG/ML
INJECTION, SOLUTION INTRAVENOUS CONTINUOUS
Status: CANCELLED | OUTPATIENT
Start: 2021-12-08

## 2021-12-08 RX ORDER — MUPIROCIN 20 MG/G
OINTMENT TOPICAL
Status: CANCELLED | OUTPATIENT
Start: 2021-12-08

## 2021-12-08 RX ORDER — TAZAROTENE 1 MG/G
1 CREAM TOPICAL DAILY
COMMUNITY
Start: 2021-11-02 | End: 2022-05-26

## 2021-12-08 RX ORDER — ACETAZOLAMIDE 250 MG/1
TABLET ORAL
COMMUNITY
Start: 2021-12-02 | End: 2022-05-26

## 2021-12-22 ENCOUNTER — TELEPHONE (OUTPATIENT)
Dept: RHEUMATOLOGY | Facility: CLINIC | Age: 56
End: 2021-12-22
Payer: COMMERCIAL

## 2021-12-22 ENCOUNTER — PATIENT MESSAGE (OUTPATIENT)
Dept: RHEUMATOLOGY | Facility: CLINIC | Age: 56
End: 2021-12-22
Payer: COMMERCIAL

## 2021-12-22 DIAGNOSIS — U07.1 COVID-19: Primary | ICD-10-CM

## 2021-12-26 ENCOUNTER — PATIENT MESSAGE (OUTPATIENT)
Dept: RHEUMATOLOGY | Facility: CLINIC | Age: 56
End: 2021-12-26
Payer: COMMERCIAL

## 2022-01-04 ENCOUNTER — LAB VISIT (OUTPATIENT)
Dept: LAB | Facility: HOSPITAL | Age: 57
End: 2022-01-04
Attending: OBSTETRICS & GYNECOLOGY
Payer: COMMERCIAL

## 2022-01-04 DIAGNOSIS — N95.0 PMB (POSTMENOPAUSAL BLEEDING): ICD-10-CM

## 2022-01-04 LAB
ANION GAP SERPL CALC-SCNC: 9 MMOL/L (ref 8–16)
BASOPHILS # BLD AUTO: 0.05 K/UL (ref 0–0.2)
BASOPHILS NFR BLD: 1.1 % (ref 0–1.9)
BUN SERPL-MCNC: 10 MG/DL (ref 6–20)
CALCIUM SERPL-MCNC: 8.7 MG/DL (ref 8.7–10.5)
CHLORIDE SERPL-SCNC: 103 MMOL/L (ref 95–110)
CO2 SERPL-SCNC: 24 MMOL/L (ref 23–29)
CREAT SERPL-MCNC: 0.7 MG/DL (ref 0.5–1.4)
DIFFERENTIAL METHOD: ABNORMAL
EOSINOPHIL # BLD AUTO: 0.1 K/UL (ref 0–0.5)
EOSINOPHIL NFR BLD: 1.1 % (ref 0–8)
ERYTHROCYTE [DISTWIDTH] IN BLOOD BY AUTOMATED COUNT: 12.2 % (ref 11.5–14.5)
EST. GFR  (AFRICAN AMERICAN): >60 ML/MIN/1.73 M^2
EST. GFR  (NON AFRICAN AMERICAN): >60 ML/MIN/1.73 M^2
GLUCOSE SERPL-MCNC: 82 MG/DL (ref 70–110)
HCT VFR BLD AUTO: 43.3 % (ref 37–48.5)
HGB BLD-MCNC: 14.3 G/DL (ref 12–16)
IMM GRANULOCYTES # BLD AUTO: 0.01 K/UL (ref 0–0.04)
IMM GRANULOCYTES NFR BLD AUTO: 0.2 % (ref 0–0.5)
LYMPHOCYTES # BLD AUTO: 0.8 K/UL (ref 1–4.8)
LYMPHOCYTES NFR BLD: 18.3 % (ref 18–48)
MCH RBC QN AUTO: 31.9 PG (ref 27–31)
MCHC RBC AUTO-ENTMCNC: 33 G/DL (ref 32–36)
MCV RBC AUTO: 97 FL (ref 82–98)
MONOCYTES # BLD AUTO: 0.6 K/UL (ref 0.3–1)
MONOCYTES NFR BLD: 13.9 % (ref 4–15)
NEUTROPHILS # BLD AUTO: 3 K/UL (ref 1.8–7.7)
NEUTROPHILS NFR BLD: 65.4 % (ref 38–73)
NRBC BLD-RTO: 0 /100 WBC
PLATELET # BLD AUTO: 248 K/UL (ref 150–450)
PMV BLD AUTO: 10.4 FL (ref 9.2–12.9)
POTASSIUM SERPL-SCNC: 4 MMOL/L (ref 3.5–5.1)
RBC # BLD AUTO: 4.48 M/UL (ref 4–5.4)
SODIUM SERPL-SCNC: 136 MMOL/L (ref 136–145)
WBC # BLD AUTO: 4.53 K/UL (ref 3.9–12.7)

## 2022-01-04 PROCEDURE — 85025 COMPLETE CBC W/AUTO DIFF WBC: CPT | Performed by: OBSTETRICS & GYNECOLOGY

## 2022-01-04 PROCEDURE — 36415 COLL VENOUS BLD VENIPUNCTURE: CPT | Mod: PO | Performed by: OBSTETRICS & GYNECOLOGY

## 2022-01-04 PROCEDURE — 80048 BASIC METABOLIC PNL TOTAL CA: CPT | Performed by: OBSTETRICS & GYNECOLOGY

## 2022-01-10 ENCOUNTER — PATIENT MESSAGE (OUTPATIENT)
Dept: RHEUMATOLOGY | Facility: CLINIC | Age: 57
End: 2022-01-10
Payer: COMMERCIAL

## 2022-01-10 ENCOUNTER — TELEPHONE (OUTPATIENT)
Dept: OBSTETRICS AND GYNECOLOGY | Facility: CLINIC | Age: 57
End: 2022-01-10
Payer: COMMERCIAL

## 2022-01-10 DIAGNOSIS — Z01.818 PRE-OP TESTING: Primary | ICD-10-CM

## 2022-01-10 NOTE — TELEPHONE ENCOUNTER
----- Message from Linda Chahal RN sent at 1/10/2022  9:39 AM CST -----  Regarding: sx pt  Good morning, this pt stated she needed to cancel sx tomorrow due to issues with meniere's and dizziness. She said she tried calling the office.Thanks.

## 2022-01-10 NOTE — TELEPHONE ENCOUNTER
Spoke to patient and due to dizziness and meniere's  she will be cancelling surgery for tomorrow.  She states she will call back to reschedule when she is better.

## 2022-01-17 ENCOUNTER — PATIENT MESSAGE (OUTPATIENT)
Dept: RHEUMATOLOGY | Facility: CLINIC | Age: 57
End: 2022-01-17
Payer: COMMERCIAL

## 2022-01-19 ENCOUNTER — TELEPHONE (OUTPATIENT)
Dept: RHEUMATOLOGY | Facility: CLINIC | Age: 57
End: 2022-01-19
Payer: COMMERCIAL

## 2022-01-19 ENCOUNTER — PATIENT MESSAGE (OUTPATIENT)
Dept: RHEUMATOLOGY | Facility: CLINIC | Age: 57
End: 2022-01-19
Payer: COMMERCIAL

## 2022-02-03 ENCOUNTER — LAB VISIT (OUTPATIENT)
Dept: LAB | Facility: HOSPITAL | Age: 57
End: 2022-02-03
Attending: INTERNAL MEDICINE
Payer: COMMERCIAL

## 2022-02-03 DIAGNOSIS — M32.9 SYSTEMIC LUPUS ERYTHEMATOSUS, UNSPECIFIED SLE TYPE, UNSPECIFIED ORGAN INVOLVEMENT STATUS: ICD-10-CM

## 2022-02-03 LAB
BASOPHILS # BLD AUTO: 0.03 K/UL (ref 0–0.2)
BASOPHILS NFR BLD: 0.5 % (ref 0–1.9)
DIFFERENTIAL METHOD: ABNORMAL
EOSINOPHIL # BLD AUTO: 0 K/UL (ref 0–0.5)
EOSINOPHIL NFR BLD: 0.2 % (ref 0–8)
ERYTHROCYTE [DISTWIDTH] IN BLOOD BY AUTOMATED COUNT: 12.4 % (ref 11.5–14.5)
ERYTHROCYTE [SEDIMENTATION RATE] IN BLOOD BY WESTERGREN METHOD: 3 MM/HR (ref 0–20)
HCT VFR BLD AUTO: 42 % (ref 37–48.5)
HGB BLD-MCNC: 13.9 G/DL (ref 12–16)
IMM GRANULOCYTES # BLD AUTO: 0.02 K/UL (ref 0–0.04)
IMM GRANULOCYTES NFR BLD AUTO: 0.3 % (ref 0–0.5)
LYMPHOCYTES # BLD AUTO: 0.5 K/UL (ref 1–4.8)
LYMPHOCYTES NFR BLD: 7.2 % (ref 18–48)
MCH RBC QN AUTO: 32.6 PG (ref 27–31)
MCHC RBC AUTO-ENTMCNC: 33.1 G/DL (ref 32–36)
MCV RBC AUTO: 98 FL (ref 82–98)
MONOCYTES # BLD AUTO: 0.4 K/UL (ref 0.3–1)
MONOCYTES NFR BLD: 6 % (ref 4–15)
NEUTROPHILS # BLD AUTO: 5.7 K/UL (ref 1.8–7.7)
NEUTROPHILS NFR BLD: 85.8 % (ref 38–73)
NRBC BLD-RTO: 0 /100 WBC
PLATELET # BLD AUTO: 238 K/UL (ref 150–450)
PMV BLD AUTO: 10.6 FL (ref 9.2–12.9)
RBC # BLD AUTO: 4.27 M/UL (ref 4–5.4)
WBC # BLD AUTO: 6.65 K/UL (ref 3.9–12.7)

## 2022-02-03 PROCEDURE — 86160 COMPLEMENT ANTIGEN: CPT | Performed by: INTERNAL MEDICINE

## 2022-02-03 PROCEDURE — 85025 COMPLETE CBC W/AUTO DIFF WBC: CPT | Performed by: INTERNAL MEDICINE

## 2022-02-03 PROCEDURE — 86225 DNA ANTIBODY NATIVE: CPT | Performed by: INTERNAL MEDICINE

## 2022-02-03 PROCEDURE — 85651 RBC SED RATE NONAUTOMATED: CPT | Performed by: INTERNAL MEDICINE

## 2022-02-03 PROCEDURE — 86160 COMPLEMENT ANTIGEN: CPT | Mod: 59 | Performed by: INTERNAL MEDICINE

## 2022-02-03 PROCEDURE — 86140 C-REACTIVE PROTEIN: CPT | Performed by: INTERNAL MEDICINE

## 2022-02-03 PROCEDURE — 80053 COMPREHEN METABOLIC PANEL: CPT | Performed by: INTERNAL MEDICINE

## 2022-02-03 PROCEDURE — 36415 COLL VENOUS BLD VENIPUNCTURE: CPT | Mod: PO | Performed by: INTERNAL MEDICINE

## 2022-02-04 DIAGNOSIS — M32.19 OTHER SYSTEMIC LUPUS ERYTHEMATOSUS WITH OTHER ORGAN INVOLVEMENT: ICD-10-CM

## 2022-02-04 LAB
ALBUMIN SERPL BCP-MCNC: 3.7 G/DL (ref 3.5–5.2)
ALP SERPL-CCNC: 69 U/L (ref 55–135)
ALT SERPL W/O P-5'-P-CCNC: 11 U/L (ref 10–44)
ANION GAP SERPL CALC-SCNC: 8 MMOL/L (ref 8–16)
AST SERPL-CCNC: 15 U/L (ref 10–40)
BILIRUB SERPL-MCNC: 0.3 MG/DL (ref 0.1–1)
BUN SERPL-MCNC: 18 MG/DL (ref 6–20)
C3 SERPL-MCNC: 87 MG/DL (ref 50–180)
C4 SERPL-MCNC: 13 MG/DL (ref 11–44)
CALCIUM SERPL-MCNC: 9.4 MG/DL (ref 8.7–10.5)
CHLORIDE SERPL-SCNC: 106 MMOL/L (ref 95–110)
CO2 SERPL-SCNC: 24 MMOL/L (ref 23–29)
CREAT SERPL-MCNC: 0.8 MG/DL (ref 0.5–1.4)
CRP SERPL-MCNC: <0.3 MG/L (ref 0–8.2)
DSDNA AB SER-ACNC: NORMAL [IU]/ML
EST. GFR  (AFRICAN AMERICAN): >60 ML/MIN/1.73 M^2
EST. GFR  (NON AFRICAN AMERICAN): >60 ML/MIN/1.73 M^2
GLUCOSE SERPL-MCNC: 118 MG/DL (ref 70–110)
POTASSIUM SERPL-SCNC: 4.4 MMOL/L (ref 3.5–5.1)
PROT SERPL-MCNC: 6.4 G/DL (ref 6–8.4)
SODIUM SERPL-SCNC: 138 MMOL/L (ref 136–145)

## 2022-02-04 RX ORDER — AZATHIOPRINE 50 MG/1
50 TABLET ORAL DAILY
Qty: 90 TABLET | Refills: 0 | Status: SHIPPED | OUTPATIENT
Start: 2022-02-04 | End: 2022-03-14

## 2022-02-07 ENCOUNTER — TELEPHONE (OUTPATIENT)
Dept: RHEUMATOLOGY | Facility: CLINIC | Age: 57
End: 2022-02-07
Payer: COMMERCIAL

## 2022-02-09 ENCOUNTER — OFFICE VISIT (OUTPATIENT)
Dept: RHEUMATOLOGY | Facility: CLINIC | Age: 57
End: 2022-02-09
Payer: COMMERCIAL

## 2022-02-09 VITALS
DIASTOLIC BLOOD PRESSURE: 63 MMHG | WEIGHT: 145 LBS | SYSTOLIC BLOOD PRESSURE: 95 MMHG | HEIGHT: 65 IN | HEART RATE: 72 BPM | BODY MASS INDEX: 24.16 KG/M2

## 2022-02-09 DIAGNOSIS — M32.9 SYSTEMIC LUPUS ERYTHEMATOSUS, UNSPECIFIED SLE TYPE, UNSPECIFIED ORGAN INVOLVEMENT STATUS: ICD-10-CM

## 2022-02-09 PROCEDURE — 3078F PR MOST RECENT DIASTOLIC BLOOD PRESSURE < 80 MM HG: ICD-10-PCS | Mod: CPTII,S$GLB,, | Performed by: INTERNAL MEDICINE

## 2022-02-09 PROCEDURE — 3008F PR BODY MASS INDEX (BMI) DOCUMENTED: ICD-10-PCS | Mod: CPTII,S$GLB,, | Performed by: INTERNAL MEDICINE

## 2022-02-09 PROCEDURE — 99999 PR PBB SHADOW E&M-EST. PATIENT-LVL IV: ICD-10-PCS | Mod: PBBFAC,,, | Performed by: INTERNAL MEDICINE

## 2022-02-09 PROCEDURE — 99214 OFFICE O/P EST MOD 30 MIN: CPT | Mod: S$GLB,,, | Performed by: INTERNAL MEDICINE

## 2022-02-09 PROCEDURE — 3074F SYST BP LT 130 MM HG: CPT | Mod: CPTII,S$GLB,, | Performed by: INTERNAL MEDICINE

## 2022-02-09 PROCEDURE — 3078F DIAST BP <80 MM HG: CPT | Mod: CPTII,S$GLB,, | Performed by: INTERNAL MEDICINE

## 2022-02-09 PROCEDURE — 99999 PR PBB SHADOW E&M-EST. PATIENT-LVL IV: CPT | Mod: PBBFAC,,, | Performed by: INTERNAL MEDICINE

## 2022-02-09 PROCEDURE — 1159F PR MEDICATION LIST DOCUMENTED IN MEDICAL RECORD: ICD-10-PCS | Mod: CPTII,S$GLB,, | Performed by: INTERNAL MEDICINE

## 2022-02-09 PROCEDURE — 1159F MED LIST DOCD IN RCRD: CPT | Mod: CPTII,S$GLB,, | Performed by: INTERNAL MEDICINE

## 2022-02-09 PROCEDURE — 3074F PR MOST RECENT SYSTOLIC BLOOD PRESSURE < 130 MM HG: ICD-10-PCS | Mod: CPTII,S$GLB,, | Performed by: INTERNAL MEDICINE

## 2022-02-09 PROCEDURE — 3008F BODY MASS INDEX DOCD: CPT | Mod: CPTII,S$GLB,, | Performed by: INTERNAL MEDICINE

## 2022-02-09 PROCEDURE — 99214 PR OFFICE/OUTPT VISIT, EST, LEVL IV, 30-39 MIN: ICD-10-PCS | Mod: S$GLB,,, | Performed by: INTERNAL MEDICINE

## 2022-02-09 RX ORDER — BELIMUMAB 200 MG/ML
200 SOLUTION SUBCUTANEOUS
Qty: 4 EACH | Refills: 5 | Status: SHIPPED | OUTPATIENT
Start: 2022-02-09 | End: 2022-05-26 | Stop reason: SDUPTHER

## 2022-02-09 ASSESSMENT — SYSTEMIC LUPUS ERYTHEMATOSUS DISEASE ACTIVITY INDEX (SLEDAI): TOTAL_SCORE: 0

## 2022-02-09 NOTE — PROGRESS NOTES
I have personally taken the history and examined the patient and agree with the resident,s note as stated above           Here on Birthday!   SLE: SLEDAI 0 . Tapered off prednisone as of 4/4/21 after 30 years for SLE. Her ENT placed her back on prednisone 5mg daily for Meniere's after worsening with Covid which she had 12/22/21. She did receive Regen-CoV antibody infusion at that time, mild symptoms except for worsening Meniere's markedly improved with prednisone 5mg daily  OA hands, left knee  moderate lymphopenia: azathioprine>SLE  Meniere's Disease     Decreased azathioprine 50mg daily  Cont belimumab 200mg sc q wk for lupus arthritis and as steroid   sparing as on max tolerated dose of azathioprine  2nd booster, 4th dose of Moderna Covid vaccine 3/22/22. Hold azathioprine and Benlysta for 1-2 wks post vaccination  Diclofenac gel prn for OA joints  1200 mg dietary calcium daily per handout(avoid tabs given h/o kidney stones)  Vitamin D 3 1000 units daily  F/u Dr. Finley for Meniere's he will be adjusting prednisone dose  RTC 3 months with standing lupus labs  Answers for HPI/ROS submitted by the patient on 2/8/2022  fever: No  eye redness: No  mouth sores: No  headaches: No  shortness of breath: No  chest pain: No  trouble swallowing: No  diarrhea: No  constipation: No  unexpected weight change: No  genital sore: No  dysuria: No  During the last 3 days, have you had a skin rash?: No  Bruises or bleeds easily: No  cough: No

## 2022-02-09 NOTE — PROGRESS NOTES
Subjective:     Patient ID: Madeleine Nunez is a 57 y.o. female.    Chief Complaint: Follow-up of SLE     HPI   Ms. Nunez is a 56yo female w/ h/o SLE, Hand and Left Knee OA, and Meniere's disease who presents for follow-up of her SLE. She was last seen in clinic on 8/10/2021 at which time the Azathioprine was decreased from 75 mg to 50 mg daily and continued on Benlysta Injections 200 mg weekly.     In the interim, she had inner ear surgery 9/13/2021 which relieved her dizziness until she got COVID before [place] which triggered her dizziness to restart. She is now taking Prednisone 5 mg once daily which has completely resolved her dizziness. Denies joint pains, dry eyes, dry mouth, oral/nasal ulcers, alopecia, dysphagia, changes to appetite. Denies morning stiffness.    SLEDAI 0    Exercise: pilates, yoga 5 times a week    Labs: ESR wnl, CRP wnl, dsDNA negative, complement wnl    Review of Systems   Constitutional: Negative for fever and unexpected weight change.   HENT: Negative for mouth sores and trouble swallowing.    Eyes: Negative for redness.   Respiratory: Negative for cough and shortness of breath.    Cardiovascular: Negative for chest pain.   Gastrointestinal: Negative for constipation and diarrhea.   Genitourinary: Negative for dysuria and genital sores.   Musculoskeletal: Negative for arthralgias, joint swelling and myalgias.   Skin: Negative for rash.   Neurological: Positive for dizziness. Negative for headaches.   Hematological: Does not bruise/bleed easily.         Objective:     Vitals:    02/09/22 1051   BP: 95/63   Pulse: 72     Physical Exam   Constitutional: She is oriented to person, place, and time. No distress.   HENT:   Head: Normocephalic and atraumatic.   Eyes: Pupils are equal, round, and reactive to light.   Neck: No tracheal deviation present.   Cardiovascular: Normal rate and regular rhythm.   Pulmonary/Chest: Effort normal and breath sounds normal. She has no wheezes.    Abdominal: Soft. Normal appearance and bowel sounds are normal.   Musculoskeletal:         General: No tenderness or deformity. Normal range of motion.      Cervical back: Normal range of motion.   Neurological: She is alert and oriented to person, place, and time.   Skin: Skin is warm and dry.   Pink lesion over right 5th MCP       Right Side Rheumatological Exam     Examination finds the 1st PIP, 1st MCP, 2nd PIP, 2nd MCP, 3rd MCP, 4th PIP, 4th MCP and 5th MCP normal.    The patient is tender to palpation of the 5th PIP    She has swelling of the 3rd PIP    Muscle Strength (0-5 scale):  Deltoid:  5  Biceps: 5/5   Triceps:  5  : 5/5   Iliopsoas: 5  Quadriceps:  5   Distal Lower Extremity: 5    Left Side Rheumatological Exam     Examination finds the 1st PIP, 1st MCP, 2nd PIP, 2nd MCP, 3rd PIP, 3rd MCP, 4th PIP, 4th MCP, 5th PIP and 5th MCP normal.    The patient is tender to palpation of the 5th DIP.    The patient has an enlarged 5th DIP.    Muscle Strength (0-5 scale):  Deltoid:  5  Biceps: 5/5   Triceps:  5  :  5/5   Iliopsoas: 5  Quadriceps:  5   Distal Lower Extremity: 5         RAO-28 (CRP): 1.89 (Remission)      The 10-year ASCVD risk score (Hamilton LISA Jr., et al., 2013) is: 1.2%    Values used to calculate the score:      Age: 57 years      Sex: Female      Is Non- : No      Diabetic: No      Tobacco smoker: No      Systolic Blood Pressure: 95 mmHg      Is BP treated: No      HDL Cholesterol: 56 mg/dL      Total Cholesterol: 204 mg/dL    Assessment:     1. SLE  2. OA Hands  3. Meniere's Disease    Plan:       1. SLE (SLEDAI 0)  -Continue Azathioprine 50mg daily and Benlysta Injections weekly  -Vitamin D3 1000 IU daily  -Last DXA 9/8/2020 was normal, due for repeat 9/8/2022  -Continue to exercise for at least 150 minutes weekly/~30 minutes a day. Exercise should consist of moderate intensity cardiovascular training such as brisk walking or biking alternating with a form of  resistance training using weights, bands or bodyweight.  -Continue Yoga/Pilates/Roderick Chi as a form of bodyweight exercise; can find videos on youtube.  -Patient encouraged to eat a Mediterranean style diet consisting of vegetables, whole grains, lentils/legumes, lean meats such as fish and poultry and olive oil for the main fat source, as well as avoidance of processed food and refined sugars for weight control benefits, inflammation and overall mental health.      2. OA Hands  -Continue to use Diclofenac Gel as needed for pain    3. Meniere's Disease  -Following with ENT in [facility]; adhere to recommendations   -Okay to continue prednisone 5 mg once daily, will defer management to ENT    4. Health Maintenance  -Due for 4th COVID Moderna (2nd booster) vaccine after March 22nd, 2022    RTC in 3 months w/ standing labs.    Nash Bledsoe MD  [employer] PM[facility]R PGY-1    I have reviewed and concur with the resident's history, physical, assessment, and plan.  I have personally interviewed and examined the patient at bedside.  See below addendum for my evaluation and additional findings.

## 2022-02-09 NOTE — PROGRESS NOTES
Pre chart    Answers for HPI/ROS submitted by the patient on 2/8/2022  fever: No  eye redness: No  mouth sores: No  headaches: No  shortness of breath: No  chest pain: No  trouble swallowing: No  diarrhea: No  constipation: No  unexpected weight change: No  genital sore: No  dysuria: No  During the last 3 days, have you had a skin rash?: No  Bruises or bleeds easily: No  cough: No

## 2022-02-23 DIAGNOSIS — D84.9 IMMUNOSUPPRESSED STATUS: ICD-10-CM

## 2022-02-28 ENCOUNTER — PATIENT MESSAGE (OUTPATIENT)
Dept: RHEUMATOLOGY | Facility: CLINIC | Age: 57
End: 2022-02-28
Payer: COMMERCIAL

## 2022-05-23 ENCOUNTER — LAB VISIT (OUTPATIENT)
Dept: LAB | Facility: HOSPITAL | Age: 57
End: 2022-05-23
Attending: INTERNAL MEDICINE
Payer: COMMERCIAL

## 2022-05-23 DIAGNOSIS — M32.9 SYSTEMIC LUPUS ERYTHEMATOSUS, UNSPECIFIED SLE TYPE, UNSPECIFIED ORGAN INVOLVEMENT STATUS: ICD-10-CM

## 2022-05-23 LAB
ALBUMIN SERPL BCP-MCNC: 3.7 G/DL (ref 3.5–5.2)
ALP SERPL-CCNC: 59 U/L (ref 55–135)
ALT SERPL W/O P-5'-P-CCNC: 10 U/L (ref 10–44)
ANION GAP SERPL CALC-SCNC: 9 MMOL/L (ref 8–16)
AST SERPL-CCNC: 17 U/L (ref 10–40)
BASOPHILS # BLD AUTO: 0.05 K/UL (ref 0–0.2)
BASOPHILS NFR BLD: 1.1 % (ref 0–1.9)
BILIRUB SERPL-MCNC: 0.7 MG/DL (ref 0.1–1)
BUN SERPL-MCNC: 15 MG/DL (ref 6–20)
C3 SERPL-MCNC: 88 MG/DL (ref 50–180)
C4 SERPL-MCNC: 12 MG/DL (ref 11–44)
CALCIUM SERPL-MCNC: 8.7 MG/DL (ref 8.7–10.5)
CHLORIDE SERPL-SCNC: 104 MMOL/L (ref 95–110)
CO2 SERPL-SCNC: 26 MMOL/L (ref 23–29)
CREAT SERPL-MCNC: 0.8 MG/DL (ref 0.5–1.4)
CRP SERPL-MCNC: <0.3 MG/L (ref 0–8.2)
DIFFERENTIAL METHOD: ABNORMAL
EOSINOPHIL # BLD AUTO: 0 K/UL (ref 0–0.5)
EOSINOPHIL NFR BLD: 0.8 % (ref 0–8)
ERYTHROCYTE [DISTWIDTH] IN BLOOD BY AUTOMATED COUNT: 12.3 % (ref 11.5–14.5)
ERYTHROCYTE [SEDIMENTATION RATE] IN BLOOD BY WESTERGREN METHOD: <2 MM/HR (ref 0–36)
EST. GFR  (AFRICAN AMERICAN): >60 ML/MIN/1.73 M^2
EST. GFR  (NON AFRICAN AMERICAN): >60 ML/MIN/1.73 M^2
GLUCOSE SERPL-MCNC: 83 MG/DL (ref 70–110)
HCT VFR BLD AUTO: 43.8 % (ref 37–48.5)
HGB BLD-MCNC: 14.3 G/DL (ref 12–16)
IMM GRANULOCYTES # BLD AUTO: 0.01 K/UL (ref 0–0.04)
IMM GRANULOCYTES NFR BLD AUTO: 0.2 % (ref 0–0.5)
LYMPHOCYTES # BLD AUTO: 0.6 K/UL (ref 1–4.8)
LYMPHOCYTES NFR BLD: 12.6 % (ref 18–48)
MCH RBC QN AUTO: 32.6 PG (ref 27–31)
MCHC RBC AUTO-ENTMCNC: 32.6 G/DL (ref 32–36)
MCV RBC AUTO: 100 FL (ref 82–98)
MONOCYTES # BLD AUTO: 0.4 K/UL (ref 0.3–1)
MONOCYTES NFR BLD: 9.3 % (ref 4–15)
NEUTROPHILS # BLD AUTO: 3.6 K/UL (ref 1.8–7.7)
NEUTROPHILS NFR BLD: 76 % (ref 38–73)
NRBC BLD-RTO: 0 /100 WBC
PLATELET # BLD AUTO: 217 K/UL (ref 150–450)
PMV BLD AUTO: 10.6 FL (ref 9.2–12.9)
POTASSIUM SERPL-SCNC: 3.9 MMOL/L (ref 3.5–5.1)
PROT SERPL-MCNC: 6.5 G/DL (ref 6–8.4)
RBC # BLD AUTO: 4.38 M/UL (ref 4–5.4)
SODIUM SERPL-SCNC: 139 MMOL/L (ref 136–145)
WBC # BLD AUTO: 4.75 K/UL (ref 3.9–12.7)

## 2022-05-23 PROCEDURE — 86160 COMPLEMENT ANTIGEN: CPT | Performed by: INTERNAL MEDICINE

## 2022-05-23 PROCEDURE — 36415 COLL VENOUS BLD VENIPUNCTURE: CPT | Mod: PO | Performed by: INTERNAL MEDICINE

## 2022-05-23 PROCEDURE — 85025 COMPLETE CBC W/AUTO DIFF WBC: CPT | Performed by: INTERNAL MEDICINE

## 2022-05-23 PROCEDURE — 80053 COMPREHEN METABOLIC PANEL: CPT | Performed by: INTERNAL MEDICINE

## 2022-05-23 PROCEDURE — 86160 COMPLEMENT ANTIGEN: CPT | Mod: 59 | Performed by: INTERNAL MEDICINE

## 2022-05-23 PROCEDURE — 86140 C-REACTIVE PROTEIN: CPT | Performed by: INTERNAL MEDICINE

## 2022-05-23 PROCEDURE — 86225 DNA ANTIBODY NATIVE: CPT | Performed by: INTERNAL MEDICINE

## 2022-05-23 PROCEDURE — 85652 RBC SED RATE AUTOMATED: CPT | Performed by: INTERNAL MEDICINE

## 2022-05-24 LAB — DSDNA AB SER-ACNC: NORMAL [IU]/ML

## 2022-05-25 ENCOUNTER — PATIENT MESSAGE (OUTPATIENT)
Dept: RHEUMATOLOGY | Facility: CLINIC | Age: 57
End: 2022-05-25
Payer: COMMERCIAL

## 2022-05-25 ENCOUNTER — TELEPHONE (OUTPATIENT)
Dept: RHEUMATOLOGY | Facility: CLINIC | Age: 57
End: 2022-05-25
Payer: COMMERCIAL

## 2022-05-26 ENCOUNTER — OFFICE VISIT (OUTPATIENT)
Dept: RHEUMATOLOGY | Facility: CLINIC | Age: 57
End: 2022-05-26
Payer: COMMERCIAL

## 2022-05-26 VITALS — HEIGHT: 65 IN | BODY MASS INDEX: 24.28 KG/M2

## 2022-05-26 DIAGNOSIS — M32.9 SYSTEMIC LUPUS ERYTHEMATOSUS, UNSPECIFIED SLE TYPE, UNSPECIFIED ORGAN INVOLVEMENT STATUS: ICD-10-CM

## 2022-05-26 DIAGNOSIS — M32.19 OTHER SYSTEMIC LUPUS ERYTHEMATOSUS WITH OTHER ORGAN INVOLVEMENT: ICD-10-CM

## 2022-05-26 PROCEDURE — 99213 OFFICE O/P EST LOW 20 MIN: CPT | Mod: 95,,, | Performed by: INTERNAL MEDICINE

## 2022-05-26 PROCEDURE — 1159F PR MEDICATION LIST DOCUMENTED IN MEDICAL RECORD: ICD-10-PCS | Mod: CPTII,95,, | Performed by: INTERNAL MEDICINE

## 2022-05-26 PROCEDURE — 3008F BODY MASS INDEX DOCD: CPT | Mod: CPTII,95,, | Performed by: INTERNAL MEDICINE

## 2022-05-26 PROCEDURE — 3008F PR BODY MASS INDEX (BMI) DOCUMENTED: ICD-10-PCS | Mod: CPTII,95,, | Performed by: INTERNAL MEDICINE

## 2022-05-26 PROCEDURE — 99213 PR OFFICE/OUTPT VISIT, EST, LEVL III, 20-29 MIN: ICD-10-PCS | Mod: 95,,, | Performed by: INTERNAL MEDICINE

## 2022-05-26 PROCEDURE — 1159F MED LIST DOCD IN RCRD: CPT | Mod: CPTII,95,, | Performed by: INTERNAL MEDICINE

## 2022-05-26 RX ORDER — AZATHIOPRINE 50 MG/1
50 TABLET ORAL DAILY
Qty: 90 TABLET | Refills: 0 | Status: SHIPPED | OUTPATIENT
Start: 2022-05-26 | End: 2022-11-09

## 2022-05-26 RX ORDER — BELIMUMAB 200 MG/ML
200 SOLUTION SUBCUTANEOUS
Qty: 4 EACH | Refills: 5 | Status: SHIPPED | OUTPATIENT
Start: 2022-05-26 | End: 2022-09-12 | Stop reason: SDUPTHER

## 2022-05-26 ASSESSMENT — SYSTEMIC LUPUS ERYTHEMATOSUS DISEASE ACTIVITY INDEX (SLEDAI): TOTAL_SCORE: 0

## 2022-05-26 NOTE — PROGRESS NOTES
Subjective:       Patient ID: Madeleine Nunez is a 57 y.o. female.    Chief Complaint: SLE  HPI       The patient location is: home  The chief complaint leading to consultation is: SLE    Visit type: audiovisual    Face to Face time with patient: 20  minutes of total time spent on the encounter, which includes face to face time and non-face to face time preparing to see the patient (eg, review of tests), Obtaining and/or reviewing separately obtained history, Documenting clinical information in the electronic or other health record, Independently interpreting results (not separately reported) and communicating results to the patient/family/caregiver, or Care coordination (not separately reported).         Each patient to whom he or she provides medical services by telemedicine is:  (1) informed of the relationship between the physician and patient and the respective role of any other health care provider with respect to management of the patient; and (2) notified that he or she may decline to receive medical services by telemedicine and may withdraw from such care at any time.    Notes:       Doing good. Small issues.  Stomach issues. Saw Gastro in [place] placed on pantoprazole, ordered breath test    Had headache, fever after 2nd booster x 1 day.   Denies any urinary symptoms currently  + also having post menopausal bleeding. Saw Gyn examined cervix, couldn't get to uterus so has procedure scheduled.   Sees Dr. Finley in June about Meniere's. As prednisone 5mg controlling it, she is very reluctant to reduce dose.  SLEDAI=0    Review of Systems   Constitutional: Negative for appetite change, fatigue, fever and unexpected weight change.   HENT: Negative for mouth sores and trouble swallowing.    Eyes: Negative for redness and visual disturbance.   Respiratory: Negative for cough, shortness of breath and wheezing.    Cardiovascular: Negative for chest pain and palpitations.   Gastrointestinal: Negative for abdominal  "pain, anal bleeding, blood in stool, constipation, diarrhea, nausea and vomiting.   Genitourinary: Negative for dysuria, frequency, genital sores and urgency.   Musculoskeletal: Negative for arthralgias, back pain, gait problem, joint swelling, myalgias, neck pain and neck stiffness.   Skin: Negative for rash.   Neurological: Negative for weakness, numbness and headaches.   Hematological: Negative for adenopathy. Does not bruise/bleed easily.   Psychiatric/Behavioral: Negative for sleep disturbance. The patient is not nervous/anxious.          Objective:   Ht 5' 4.8" (1.646 m)   LMP  (LMP Unknown)   BMI 24.28 kg/m²      Physical Exam   Melasama cheeks  Alert and oriented  Looks well   2/9/2022   Tender (RAO-28) 1 / 28    Swollen (RAO-28) 1 / 28    Provider Global --   Patient Global 0 mm   ESR 3 mm/hr   CRP 0.3 mg/L   RAO-28 (ESR) 1.61 (Remission)   RAO-28 (CRP) 1.89 (Remission)   CDAI Score --         Latest Reference Range & Units 05/23/22 10:40   WBC 3.90 - 12.70 K/uL 4.75   RBC 4.00 - 5.40 M/uL 4.38   Hemoglobin 12.0 - 16.0 g/dL 14.3   Hematocrit 37.0 - 48.5 % 43.8   MCV 82 - 98 fL 100 (H)   MCH 27.0 - 31.0 pg 32.6 (H)   MCHC 32.0 - 36.0 g/dL 32.6   RDW 11.5 - 14.5 % 12.3   Platelets 150 - 450 K/uL 217   MPV 9.2 - 12.9 fL 10.6   Gran % 38.0 - 73.0 % 76.0 (H)   Lymph % 18.0 - 48.0 % 12.6 (L)   Mono % 4.0 - 15.0 % 9.3   Eosinophil % 0.0 - 8.0 % 0.8   Basophil % 0.0 - 1.9 % 1.1   Immature Granulocytes 0.0 - 0.5 % 0.2   Gran # (ANC) 1.8 - 7.7 K/uL 3.6   Lymph # 1.0 - 4.8 K/uL 0.6 (L)   Mono # 0.3 - 1.0 K/uL 0.4   Eos # 0.0 - 0.5 K/uL 0.0   Baso # 0.00 - 0.20 K/uL 0.05   Immature Grans (Abs) 0.00 - 0.04 K/uL 0.01   nRBC 0 /100 WBC 0   Differential Method  Automated   Sed Rate 0 - 36 mm/Hr <2   Sodium 136 - 145 mmol/L 139   Potassium 3.5 - 5.1 mmol/L 3.9   Chloride 95 - 110 mmol/L 104   CO2 23 - 29 mmol/L 26   Anion Gap 8 - 16 mmol/L 9   BUN 6 - 20 mg/dL 15   Creatinine 0.5 - 1.4 mg/dL 0.8   eGFR if non African " American >60 mL/min/1.73 m^2 >60.0   eGFR if African American >60 mL/min/1.73 m^2 >60.0   Glucose 70 - 110 mg/dL 83   Calcium 8.7 - 10.5 mg/dL 8.7   Alkaline Phosphatase 55 - 135 U/L 59   PROTEIN TOTAL 6.0 - 8.4 g/dL 6.5   Albumin 3.5 - 5.2 g/dL 3.7   BILIRUBIN TOTAL 0.1 - 1.0 mg/dL 0.7   AST 10 - 40 U/L 17   ALT 10 - 44 U/L 10   CRP 0.0 - 8.2 mg/L <0.3   ds DNA Ab Negative 1:10  Negative 1:10   Complement (C-3) 50 - 180 mg/dL 88   Complement (C-4) 11 - 44 mg/dL 12   (H): Data is abnormally high  (L): Data is abnormally low     Latest Reference Range & Units 05/23/22 10:36   NITRITE UA Negative  Negative   Bilirubin (UA) Negative  Negative   Leukocytes, UA Negative  1+ !   WBC, UA 0 - 5 /hpf 14 (H)   Bacteria, UA None-Occ /hpf Many !   Squam Epithel, UA /hpf 4   Microscopic Comment  SEE COMMENT   Creatinine, Urine 15.0 - 325.0 mg/dL 178.0   Protein, Urine Random 0 - 15 mg/dL <7   Prot/Creat Ratio, Urine 0.00 - 0.20  Unable to calculate   !: Data is abnormal  (H): Data is abnormally high    Assessment:     SLE: SLEDAI 0 . Tapered off prednisone as of 4/4/21 after 30 years for SLE. Her ENT placed her back on prednisone 5mg daily for Meniere's after worsening with Covid which she had 12/22/21. She did receive Regen-CoV antibody infusion at that time, mild symptoms except for worsening Meniere's markedly improved with prednisone 5mg daily  OA hands, left knee asymptomatic   moderate lymphopenia: azathioprine>SLE  Asymptomatic pyuria  PMB  Meniere's Disease on prednisone 5mg daily as it is controlling episodes        Plan:    fax Gyn and Dr. Finley's notes after visits  Will keep azathioprine 50mg daily until prednisone able to be tapered  Cont belimumab 200mg sc q wk for lupus arthritis and as steroid   sparing agent  Diclofenac gel prn for OA joints  1200 mg dietary calcium daily per handout(avoid tabs given h/o kidney stones)  Vitamin D 3 1000 units daily  F/u Dr. Finley for Meniere's in June as scheduled he will be  adjusting prednisone dose, as from SLE standpoint she can taper  Repeat UA in 4 wks  RTC 3 months with standing lupus labs

## 2022-05-26 NOTE — PATIENT INSTRUCTIONS
Will keep azathioprine 50mg daily until we are able to taper/reduce prednisone  Please have Dr. Finley fax his note when you see him in June  Please fax the note of your gynecologist   Continue your exercise program

## 2022-06-13 ENCOUNTER — PATIENT MESSAGE (OUTPATIENT)
Dept: RHEUMATOLOGY | Facility: CLINIC | Age: 57
End: 2022-06-13
Payer: COMMERCIAL

## 2022-06-13 ENCOUNTER — TELEPHONE (OUTPATIENT)
Dept: RHEUMATOLOGY | Facility: CLINIC | Age: 57
End: 2022-06-13
Payer: COMMERCIAL

## 2022-06-13 NOTE — TELEPHONE ENCOUNTER
Hello     I visited with Dr. Garnett this morning and he was not able to take a biopsy on my uterus so I will have to do a D & C and hysteroscopy on July 27th.  They also did an ultrasound on the lining on my uterus and Dr Garnett said that it was good.     Madeleine Enrique      Please tell her to hold the azathioprine and Benlysta one week before surgery and resume one week after surgery. Continue prednisone through surgery. Thank you MINA

## 2022-06-28 ENCOUNTER — LAB VISIT (OUTPATIENT)
Dept: LAB | Facility: HOSPITAL | Age: 57
End: 2022-06-28
Attending: INTERNAL MEDICINE
Payer: COMMERCIAL

## 2022-06-28 DIAGNOSIS — M32.9 SYSTEMIC LUPUS ERYTHEMATOSUS, UNSPECIFIED SLE TYPE, UNSPECIFIED ORGAN INVOLVEMENT STATUS: ICD-10-CM

## 2022-06-28 LAB
BACTERIA #/AREA URNS AUTO: NORMAL /HPF
BILIRUB UR QL STRIP: NEGATIVE
CLARITY UR REFRACT.AUTO: ABNORMAL
COLOR UR AUTO: YELLOW
CREAT UR-MCNC: 84 MG/DL (ref 15–325)
GLUCOSE UR QL STRIP: NEGATIVE
HGB UR QL STRIP: ABNORMAL
KETONES UR QL STRIP: NEGATIVE
LEUKOCYTE ESTERASE UR QL STRIP: ABNORMAL
MICROSCOPIC COMMENT: NORMAL
NITRITE UR QL STRIP: NEGATIVE
PH UR STRIP: 6 [PH] (ref 5–8)
PROT UR QL STRIP: NEGATIVE
PROT UR-MCNC: <7 MG/DL (ref 0–15)
PROT/CREAT UR: NORMAL MG/G{CREAT} (ref 0–0.2)
RBC #/AREA URNS AUTO: 1 /HPF (ref 0–4)
SP GR UR STRIP: 1.01 (ref 1–1.03)
SQUAMOUS #/AREA URNS AUTO: 4 /HPF
URN SPEC COLLECT METH UR: ABNORMAL
WBC #/AREA URNS AUTO: 4 /HPF (ref 0–5)

## 2022-06-28 PROCEDURE — 84156 ASSAY OF PROTEIN URINE: CPT | Performed by: INTERNAL MEDICINE

## 2022-06-28 PROCEDURE — 81001 URINALYSIS AUTO W/SCOPE: CPT | Performed by: INTERNAL MEDICINE

## 2022-08-02 ENCOUNTER — TELEPHONE (OUTPATIENT)
Dept: RHEUMATOLOGY | Facility: CLINIC | Age: 57
End: 2022-08-02
Payer: COMMERCIAL

## 2022-09-06 ENCOUNTER — LAB VISIT (OUTPATIENT)
Dept: LAB | Facility: HOSPITAL | Age: 57
End: 2022-09-06
Attending: INTERNAL MEDICINE
Payer: COMMERCIAL

## 2022-09-06 DIAGNOSIS — M32.9 SYSTEMIC LUPUS ERYTHEMATOSUS, UNSPECIFIED SLE TYPE, UNSPECIFIED ORGAN INVOLVEMENT STATUS: ICD-10-CM

## 2022-09-06 LAB
BILIRUB UR QL STRIP: NEGATIVE
CLARITY UR REFRACT.AUTO: CLEAR
COLOR UR AUTO: YELLOW
CREAT UR-MCNC: 186 MG/DL (ref 15–325)
GLUCOSE UR QL STRIP: NEGATIVE
HGB UR QL STRIP: NEGATIVE
KETONES UR QL STRIP: NEGATIVE
LEUKOCYTE ESTERASE UR QL STRIP: NEGATIVE
NITRITE UR QL STRIP: NEGATIVE
PH UR STRIP: 6 [PH] (ref 5–8)
PROT UR QL STRIP: NEGATIVE
PROT UR-MCNC: 10 MG/DL (ref 0–15)
PROT/CREAT UR: 0.05 MG/G{CREAT} (ref 0–0.2)
SP GR UR STRIP: 1.02 (ref 1–1.03)
URN SPEC COLLECT METH UR: NORMAL

## 2022-09-06 PROCEDURE — 84156 ASSAY OF PROTEIN URINE: CPT | Performed by: INTERNAL MEDICINE

## 2022-09-06 PROCEDURE — 81003 URINALYSIS AUTO W/O SCOPE: CPT | Performed by: INTERNAL MEDICINE

## 2022-09-08 ENCOUNTER — PATIENT MESSAGE (OUTPATIENT)
Dept: RHEUMATOLOGY | Facility: CLINIC | Age: 57
End: 2022-09-08
Payer: COMMERCIAL

## 2022-09-12 ENCOUNTER — SPECIALTY PHARMACY (OUTPATIENT)
Dept: PHARMACY | Facility: CLINIC | Age: 57
End: 2022-09-12
Payer: COMMERCIAL

## 2022-09-12 DIAGNOSIS — M32.9 SYSTEMIC LUPUS ERYTHEMATOSUS, UNSPECIFIED SLE TYPE, UNSPECIFIED ORGAN INVOLVEMENT STATUS: ICD-10-CM

## 2022-09-12 RX ORDER — BELIMUMAB 200 MG/ML
200 SOLUTION SUBCUTANEOUS
Qty: 4 EACH | Refills: 5 | Status: SHIPPED | OUTPATIENT
Start: 2022-09-12 | End: 2022-11-02 | Stop reason: SDUPTHER

## 2022-09-12 NOTE — TELEPHONE ENCOUNTER
Danielito, this is Sarah Hurley with [facility].  We are working on your prescription that your doctor has sent us. We will be working with your insurance to get this approved for you. We will be calling you along the way with updates on your medication.  If you have any questions, you can reach us at [phone].    Welcome call outcome: Patient/caregiver reached    PA active through 10/8/22.    [facility]. Patient must fill at [facility]    -Patient informed via telephone.   -Will transfer Rx and close out at [facility].

## 2022-09-21 NOTE — PATIENT INSTRUCTIONS
called to check on this    Informed of below message    He verbalized understanding and hoping it can be faxed tomorrow afternoon so she can move in Monday    Due for 4th COVID Moderna (2nd booster) after March 22nd, 2022  Continue Azathioprine 50mg daily and Benlysta Injections weekly  Continue Vitamin D3 1000 units once daily  Continue Prednisone 5 mg once daily, will let ENT adjust dose accordingly  Diclofenac gel 1% to hands as needed for pain    Follow up in 3 months with labs

## 2022-10-06 ENCOUNTER — PATIENT MESSAGE (OUTPATIENT)
Dept: RHEUMATOLOGY | Facility: CLINIC | Age: 57
End: 2022-10-06
Payer: COMMERCIAL

## 2022-11-01 ENCOUNTER — PATIENT MESSAGE (OUTPATIENT)
Dept: RHEUMATOLOGY | Facility: CLINIC | Age: 57
End: 2022-11-01
Payer: COMMERCIAL

## 2022-11-01 DIAGNOSIS — M32.9 SYSTEMIC LUPUS ERYTHEMATOSUS, UNSPECIFIED SLE TYPE, UNSPECIFIED ORGAN INVOLVEMENT STATUS: ICD-10-CM

## 2022-11-02 RX ORDER — BELIMUMAB 200 MG/ML
200 SOLUTION SUBCUTANEOUS
Qty: 4 EACH | Refills: 5 | Status: SHIPPED | OUTPATIENT
Start: 2022-11-02 | End: 2023-06-13

## 2022-11-03 ENCOUNTER — SPECIALTY PHARMACY (OUTPATIENT)
Dept: PHARMACY | Facility: CLINIC | Age: 57
End: 2022-11-03
Payer: COMMERCIAL

## 2022-11-03 NOTE — TELEPHONE ENCOUNTER
Danielito, this is Abigail Bear with [facility].  We are working on your prescription that your doctor has sent us. We will be working with your insurance to get this approved for you. We will be calling you along the way with updates on your medication.  If you have any questions, you can reach us at [phone].    Welcome call outcome: Left voicemail  .  PA submitted via [facility] (Key: [id number]). Continuation of therapy, locked into [facility]. Will route once PA completed.

## 2022-11-07 NOTE — TELEPHONE ENCOUNTER
DOCUMENTATION ONLY:  Prior authorization for Ian approved from 11/4/22 to 11/4/23    Case Id:  [id number]    [facility] into [facility]. [facility] and closing patient out of [facility]. Patient informed.

## 2022-11-19 ENCOUNTER — PATIENT MESSAGE (OUTPATIENT)
Dept: RHEUMATOLOGY | Facility: CLINIC | Age: 57
End: 2022-11-19
Payer: COMMERCIAL

## 2022-11-29 ENCOUNTER — PATIENT MESSAGE (OUTPATIENT)
Dept: RHEUMATOLOGY | Facility: CLINIC | Age: 57
End: 2022-11-29
Payer: COMMERCIAL

## 2022-11-30 ENCOUNTER — OFFICE VISIT (OUTPATIENT)
Dept: RHEUMATOLOGY | Facility: CLINIC | Age: 57
End: 2022-11-30
Payer: COMMERCIAL

## 2022-11-30 DIAGNOSIS — M32.9 SYSTEMIC LUPUS ERYTHEMATOSUS, UNSPECIFIED SLE TYPE, UNSPECIFIED ORGAN INVOLVEMENT STATUS: Primary | ICD-10-CM

## 2022-11-30 DIAGNOSIS — E78.5 HYPERLIPIDEMIA, UNSPECIFIED HYPERLIPIDEMIA TYPE: ICD-10-CM

## 2022-11-30 PROCEDURE — 99213 PR OFFICE/OUTPT VISIT, EST, LEVL III, 20-29 MIN: ICD-10-PCS | Mod: 95,,, | Performed by: INTERNAL MEDICINE

## 2022-11-30 PROCEDURE — 99213 OFFICE O/P EST LOW 20 MIN: CPT | Mod: 95,,, | Performed by: INTERNAL MEDICINE

## 2022-11-30 PROCEDURE — 1159F PR MEDICATION LIST DOCUMENTED IN MEDICAL RECORD: ICD-10-PCS | Mod: CPTII,95,, | Performed by: INTERNAL MEDICINE

## 2022-11-30 PROCEDURE — 1159F MED LIST DOCD IN RCRD: CPT | Mod: CPTII,95,, | Performed by: INTERNAL MEDICINE

## 2022-11-30 NOTE — PROGRESS NOTES
Answers submitted by the patient for this visit:  Rheumatology Questionnaire (Submitted on 11/29/2022)  fever: No  eye redness: No  mouth sores: No  headaches: No  shortness of breath: No  chest pain: No  trouble swallowing: No  diarrhea: No  constipation: No  unexpected weight change: No  genital sore: No  dysuria: No  During the last 3 days, have you had a skin rash?: No  Bruises or bleeds easily: No  cough: No

## 2022-11-30 NOTE — PROGRESS NOTES
Subjective:       Patient ID: Madeleine Nunez is a 57 y.o. female.    Chief Complaint: SLE, OA  The patient location is: home  The chief complaint leading to consultation is: SLE    Visit type: audiovisual    Face to Face time with patient: 20  30 minutes of total time spent on the encounter, which includes face to face time and non-face to face time preparing to see the patient (eg, review of tests), Obtaining and/or reviewing separately obtained history, Documenting clinical information in the electronic or other health record, Independently interpreting results (not separately reported) and communicating results to the patient/family/caregiver, or Care coordination (not separately reported).     Each patient to whom he or she provides medical services by telemedicine is:  (1) informed of the relationship between the physician and patient and the respective role of any other health care provider with respect to management of the patient; and (2) notified that he or she may decline to receive medical services by telemedicine and may withdraw from such care at any time.    Notes:     Patient states that her lupus has been doing well. Denies stiffness/joint pains, hair loss, oral ulcers, rashes. About 1 month ago, she had flare up of her Meniere's and experienced severe vertigo with n/v. She was given prednisone injection which helped tremendously. At that time, her ENT restarted prednisone 5mg daily for meniere's; however, states that ENT is planning to taper starting 12/8. Roughly 2 weeks ago noticed blood on sheets that was coming from right breast. Had mammogram that showed no changes from previous; However, US showed mass behind right nipple. She is having biopsy today and will send results.      Review of Systems   Constitutional:  Negative for fever and unexpected weight change.   HENT:  Negative for mouth sores and trouble swallowing.    Eyes:  Negative for redness.   Respiratory:  Negative for cough and  shortness of breath.    Cardiovascular:  Negative for chest pain.   Gastrointestinal:  Negative for constipation and diarrhea.   Genitourinary:  Negative for dysuria and genital sores.   Skin:  Negative for rash.   Neurological:  Negative for headaches.   Hematological:  Does not bruise/bleed easily.       Objective:   LMP  (LMP Unknown)      Physical Exam limited secondary to TeleHealth Visit. Following performed via video feed and patient assistance.   Physical Exam   Constitutional: She is oriented to person, place, and time. normal appearance.   HENT:   Head: Normocephalic.   Eyes: Conjunctivae are normal.   Pulmonary/Chest: Effort normal.   Musculoskeletal:         General: Normal range of motion.      Cervical back: Normal range of motion.   Neurological: She is alert and oriented to person, place, and time.   Psychiatric: Her behavior is normal. Mood and thought content normal.       2/9/2022   Tender (RAO-28) 1 / 28    Swollen (RAO-28) 1 / 28    Provider Global --   Patient Global 0 mm   ESR 3 mm/hr   CRP 0.3 mg/L   RAO-28 (ESR) 1.61 (Remission)   RAO-28 (CRP) 1.89 (Remission)   CDAI Score --        Assessment:        SLE: SLEDAI 0 - pending updated labs. Tapered off prednisone as of 4/4/21 after 30 years for SLE. Her ENT placed her back on prednisone 5mg daily for Meniere's flare up.   OA hands, left knee asymptomatic   moderate lymphopenia: azathioprine>SLE  PMB  Meniere's Disease on prednisone 5mg daily as it is controlling episodes      Plan:       *Bivalent Covid booster - hold Benlysta and Azathioprine for 1 week after any vaccination  - Standing lupus labs and Lipid panel ordered  Will keep azathioprine 50mg daily until prednisone able to be tapered  Cont belimumab 200mg sc q wk for lupus arthritis and as steroid sparing agent  Diclofenac gel prn for OA joints  1200 mg dietary calcium daily per handout(avoid tabs given h/o kidney stones)  Vitamin D 3 1000 units daily  F/u Dr. Finley for Meniere's  12/8/22 with plan of prednisone taper - from SLE standpoint she has done well without  RTC 3 months with standing lupus labs

## 2022-11-30 NOTE — PROGRESS NOTES
I have personally taken the history and examined the patient and agree with the resident,s note as stated above        The patient location is: car in [place]  The chief complaint leading to consultation is: SLE    Visit type: audiovisual    Face to Face time with patient: 15 min  20 minutes of total time spent on the encounter, which includes face to face time and non-face to face time preparing to see the patient (eg, review of tests), Obtaining and/or reviewing separately obtained history, Documenting clinical information in the electronic or other health record, Independently interpreting results (not separately reported) and communicating results to the patient/family/caregiver, or Care coordination (not separately reported).         Each patient to whom he or she provides medical services by telemedicine is:  (1) informed of the relationship between the physician and patient and the respective role of any other health care provider with respect to management of the patient; and (2) notified that he or she may decline to receive medical services by telemedicine and may withdraw from such care at any time.    Notes:           SLE: SLEDAI 0 pending all labs . Tapered off prednisone as of 4/4/21 after 30 years for SLE. Her ENT placed her back on prednisone 5mg daily for Meniere's after worsening with Covid which she had 12/22/21. She did receive Regen-CoV antibody infusion at that time, mild symptoms except for worsening Meniere's markedly improved with prednisone 5mg daily. Sees ENT next week with plan to begin prednisone taper  2 wks ago bloody discharge right breast, headed in car for right breast biopsy  OA hands, left knee asymptomatic   moderate lymphopenia: azathioprine>SLE  Asymptomatic pyuria  PMB  Meniere's Disease on prednisone 5mg daily as it is controlling episodes      Standing lupus labs, lipid panel to be scheduled this week  *bivalent Covid booster, hold azathioprine and Benlysta for one week  post vaccination   Will keep azathioprine 50mg daily until prednisone able to be tapered  Cont belimumab 200mg sc q wk for lupus arthritis and as steroid   sparing agent  Diclofenac gel prn for OA joints  1200 mg dietary calcium daily per handout(avoid tabs given h/o kidney stones)  Vitamin D 3 1000 units daily  F/u Dr. Finley for Meniere's i as scheduled he will be adjusting prednisone dose, as from SLE standpoint she can taper  RTC 3 months with standing lupus labs  Answers submitted by the patient for this visit:  Rheumatology Questionnaire (Submitted on 11/29/2022)  fever: No  eye redness: No  mouth sores: No  headaches: No  shortness of breath: No  chest pain: No  trouble swallowing: No  diarrhea: No  constipation: No  unexpected weight change: No  genital sore: No  dysuria: No  During the last 3 days, have you had a skin rash?: No  Bruises or bleeds easily: No  cough: No

## 2022-11-30 NOTE — PATIENT INSTRUCTIONS
*Bivalent Covid booster - hold Benlysta and Azathioprine for 1 week after any vaccination  - Standing lupus labs and Lipid panel - will call to schedule these labs  Continue azathioprine 50mg daily  Cont benlysta 200mg injection  Diclofenac gel as needed for joints  1200 mg dietary calcium daily per handout(avoid tabs given h/o kidney stones)  Vitamin D 3 1000 units daily  F/u Dr. Finley for Meniere's 12/8/22 with plan of prednisone taper   RTC 3 months with standing lupus labs

## 2022-12-28 ENCOUNTER — PATIENT MESSAGE (OUTPATIENT)
Dept: RHEUMATOLOGY | Facility: CLINIC | Age: 57
End: 2022-12-28
Payer: COMMERCIAL

## 2022-12-28 ENCOUNTER — TELEPHONE (OUTPATIENT)
Dept: RHEUMATOLOGY | Facility: CLINIC | Age: 57
End: 2022-12-28
Payer: COMMERCIAL

## 2022-12-28 DIAGNOSIS — M32.19 OTHER SYSTEMIC LUPUS ERYTHEMATOSUS WITH OTHER ORGAN INVOLVEMENT: ICD-10-CM

## 2022-12-28 RX ORDER — AZATHIOPRINE 50 MG/1
50 TABLET ORAL 2 TIMES DAILY
Qty: 60 TABLET | Refills: 1 | Status: SHIPPED | OUTPATIENT
Start: 2022-12-28 | End: 2023-05-19

## 2022-12-29 ENCOUNTER — PATIENT MESSAGE (OUTPATIENT)
Dept: RHEUMATOLOGY | Facility: CLINIC | Age: 57
End: 2022-12-29
Payer: COMMERCIAL

## 2023-01-12 ENCOUNTER — TELEPHONE (OUTPATIENT)
Dept: RHEUMATOLOGY | Facility: CLINIC | Age: 58
End: 2023-01-12
Payer: COMMERCIAL

## 2023-01-12 ENCOUNTER — LAB VISIT (OUTPATIENT)
Dept: LAB | Facility: HOSPITAL | Age: 58
End: 2023-01-12
Attending: INTERNAL MEDICINE
Payer: COMMERCIAL

## 2023-01-12 DIAGNOSIS — E78.5 HYPERLIPIDEMIA, UNSPECIFIED HYPERLIPIDEMIA TYPE: ICD-10-CM

## 2023-01-12 DIAGNOSIS — M32.9 SYSTEMIC LUPUS ERYTHEMATOSUS, UNSPECIFIED SLE TYPE, UNSPECIFIED ORGAN INVOLVEMENT STATUS: ICD-10-CM

## 2023-01-12 LAB
ALBUMIN SERPL BCP-MCNC: 3.6 G/DL (ref 3.5–5.2)
ALP SERPL-CCNC: 53 U/L (ref 55–135)
ALT SERPL W/O P-5'-P-CCNC: 16 U/L (ref 10–44)
ANION GAP SERPL CALC-SCNC: 9 MMOL/L (ref 8–16)
AST SERPL-CCNC: 16 U/L (ref 10–40)
BASOPHILS # BLD AUTO: 0.04 K/UL (ref 0–0.2)
BASOPHILS NFR BLD: 0.9 % (ref 0–1.9)
BILIRUB SERPL-MCNC: 0.7 MG/DL (ref 0.1–1)
BUN SERPL-MCNC: 11 MG/DL (ref 6–20)
C3 SERPL-MCNC: 111 MG/DL (ref 50–180)
C4 SERPL-MCNC: 14 MG/DL (ref 11–44)
CALCIUM SERPL-MCNC: 8.7 MG/DL (ref 8.7–10.5)
CHLORIDE SERPL-SCNC: 101 MMOL/L (ref 95–110)
CHOLEST SERPL-MCNC: 173 MG/DL (ref 120–199)
CHOLEST/HDLC SERPL: 3.5 {RATIO} (ref 2–5)
CO2 SERPL-SCNC: 26 MMOL/L (ref 23–29)
CREAT SERPL-MCNC: 0.8 MG/DL (ref 0.5–1.4)
CRP SERPL-MCNC: 0.4 MG/L (ref 0–8.2)
DIFFERENTIAL METHOD: ABNORMAL
EOSINOPHIL # BLD AUTO: 0.1 K/UL (ref 0–0.5)
EOSINOPHIL NFR BLD: 1.3 % (ref 0–8)
ERYTHROCYTE [DISTWIDTH] IN BLOOD BY AUTOMATED COUNT: 11.9 % (ref 11.5–14.5)
ERYTHROCYTE [SEDIMENTATION RATE] IN BLOOD BY WESTERGREN METHOD: 2 MM/HR (ref 0–20)
EST. GFR  (NO RACE VARIABLE): >60 ML/MIN/1.73 M^2
GLUCOSE SERPL-MCNC: 92 MG/DL (ref 70–110)
HCT VFR BLD AUTO: 41.1 % (ref 37–48.5)
HDLC SERPL-MCNC: 50 MG/DL (ref 40–75)
HDLC SERPL: 28.9 % (ref 20–50)
HGB BLD-MCNC: 14 G/DL (ref 12–16)
IMM GRANULOCYTES # BLD AUTO: 0.01 K/UL (ref 0–0.04)
IMM GRANULOCYTES NFR BLD AUTO: 0.2 % (ref 0–0.5)
LDLC SERPL CALC-MCNC: 98.8 MG/DL (ref 63–159)
LYMPHOCYTES # BLD AUTO: 0.8 K/UL (ref 1–4.8)
LYMPHOCYTES NFR BLD: 18.1 % (ref 18–48)
MCH RBC QN AUTO: 32.9 PG (ref 27–31)
MCHC RBC AUTO-ENTMCNC: 34.1 G/DL (ref 32–36)
MCV RBC AUTO: 97 FL (ref 82–98)
MONOCYTES # BLD AUTO: 0.4 K/UL (ref 0.3–1)
MONOCYTES NFR BLD: 9.6 % (ref 4–15)
NEUTROPHILS # BLD AUTO: 3.1 K/UL (ref 1.8–7.7)
NEUTROPHILS NFR BLD: 69.9 % (ref 38–73)
NONHDLC SERPL-MCNC: 123 MG/DL
NRBC BLD-RTO: 0 /100 WBC
PLATELET # BLD AUTO: 257 K/UL (ref 150–450)
PMV BLD AUTO: 10.5 FL (ref 9.2–12.9)
POTASSIUM SERPL-SCNC: 4.1 MMOL/L (ref 3.5–5.1)
PROT SERPL-MCNC: 6.7 G/DL (ref 6–8.4)
RBC # BLD AUTO: 4.26 M/UL (ref 4–5.4)
SODIUM SERPL-SCNC: 136 MMOL/L (ref 136–145)
TRIGL SERPL-MCNC: 121 MG/DL (ref 30–150)
WBC # BLD AUTO: 4.48 K/UL (ref 3.9–12.7)

## 2023-01-12 PROCEDURE — 86160 COMPLEMENT ANTIGEN: CPT | Performed by: INTERNAL MEDICINE

## 2023-01-12 PROCEDURE — 86140 C-REACTIVE PROTEIN: CPT | Performed by: INTERNAL MEDICINE

## 2023-01-12 PROCEDURE — 85651 RBC SED RATE NONAUTOMATED: CPT | Performed by: INTERNAL MEDICINE

## 2023-01-12 PROCEDURE — 80053 COMPREHEN METABOLIC PANEL: CPT | Performed by: INTERNAL MEDICINE

## 2023-01-12 PROCEDURE — 86160 COMPLEMENT ANTIGEN: CPT | Mod: 59 | Performed by: INTERNAL MEDICINE

## 2023-01-12 PROCEDURE — 86225 DNA ANTIBODY NATIVE: CPT | Performed by: INTERNAL MEDICINE

## 2023-01-12 PROCEDURE — 80061 LIPID PANEL: CPT | Performed by: INTERNAL MEDICINE

## 2023-01-12 PROCEDURE — 36415 COLL VENOUS BLD VENIPUNCTURE: CPT | Mod: PN | Performed by: INTERNAL MEDICINE

## 2023-01-12 PROCEDURE — 85025 COMPLETE CBC W/AUTO DIFF WBC: CPT | Performed by: INTERNAL MEDICINE

## 2023-01-13 LAB — DSDNA AB SER-ACNC: NORMAL [IU]/ML

## 2023-01-15 ENCOUNTER — PATIENT MESSAGE (OUTPATIENT)
Dept: RHEUMATOLOGY | Facility: CLINIC | Age: 58
End: 2023-01-15
Payer: COMMERCIAL

## 2023-01-17 ENCOUNTER — PATIENT MESSAGE (OUTPATIENT)
Dept: RHEUMATOLOGY | Facility: CLINIC | Age: 58
End: 2023-01-17
Payer: COMMERCIAL

## 2023-02-25 PROBLEM — N64.52 BLOODY DISCHARGE FROM RIGHT NIPPLE: Status: ACTIVE | Noted: 2023-02-25

## 2023-02-25 PROBLEM — D24.1 INTRADUCTAL PAPILLOMA OF RIGHT BREAST: Status: ACTIVE | Noted: 2023-02-25

## 2023-02-25 PROBLEM — Z80.3 FAMILY HISTORY OF MALIGNANT NEOPLASM OF BREAST: Status: ACTIVE | Noted: 2023-02-25

## 2023-02-26 NOTE — PROGRESS NOTES
[facility]    Chief Complaint:   Madeleine Nunez is a 58 y.o. female presenting due to her recent core biopsy showing a papilloma. Excisional biopsy has been recommended    History of Present Illness:   Mrs. Nunez first presented on November 29, 2022 with a bloody right nipple discharge (spontaneous) that occurred multiple times. Her last mammogram was normal. Ultrsound showed an intraductal mass and sonocore biopsy was consistent with an intraductal papilloma. Excision has been recommended. MD::: Sea Garnett MD.      Past Medical History:   Diagnosis Date    Anemia     Blood transfusion     Bloody discharge from right nipple 02/25/2023    Dry eyes     Family history of malignant neoplasm of breast 02/25/2023    Hypothyroidism 10/09/2012    Intraductal papilloma of right breast 02/25/2023    She understands that anytime a papilloma is found at the time of core biopsy - that an excisional biopsy is needed to rule out an adjacent cancer. All R/B/I were discussed with the patient, and she wishes to proceed. PLAN:::: RIGHT BREAST BIOPSY AFTER NEEDLE LOCALIZATION.    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    Osteoarthritis of left knee 04/25/2018    Substance abuse     Systemic lupus erythematosus       Past Surgical History:   Procedure Laterality Date    BLADDER SUSPENSION      CHOLECYSTECTOMY      HYSTEROSCOPY WITH DILATION AND CURETTAGE OF UTERUS  07/27/2022    INNER EAR SURGERY      shunt        Current Outpatient Medications:     azaTHIOprine (IMURAN) 50 mg Tab, Take 1 tablet (50 mg total) by mouth 2 (two) times daily., Disp: 60 tablet, Rfl: 1    belimumab (BENLYSTA) 200 mg/mL AtIn, Inject 1 mL (200 mg total) into the skin every 7 days., Disp: 4 each, Rfl: 5    betahistine HCl (BETAHISTINE, BULK, MISC), 14 mg by Misc.(Non-Drug; Combo Route) route 3 (three)  times daily. , Disp: , Rfl:     diclofenac sodium (SOLARAZE) 3 % gel, APPLY LIBERALLY TO AFFECTED PAINFUL JOINT 4 TIMES A DAY, Disp: 300 g, Rfl: 1    estradioL (ESTRACE) 2 MG tablet, TAKE 1 TABLET (2 MG TOTAL) BY MOUTH ONCE DAILY., Disp: 30 tablet, Rfl: 1    HYDROcodone-acetaminophen (NORCO) 7.5-325 mg per tablet, Take 1 tablet by mouth every 6 (six) hours as needed for Pain., Disp: 10 tablet, Rfl: 0    levothyroxine (SYNTHROID) 100 MCG tablet, Take 1 tablet by mouth once daily., Disp: , Rfl:     progesterone (PROMETRIUM) 200 MG capsule, TAKE 1 CAPSULE BY MOUTH EVERY EVENING FROM 7-9PM, Disp: , Rfl: 6    sertraline (ZOLOFT) 50 MG tablet, , Disp: , Rfl:     spironolactone (ALDACTONE) 50 MG tablet, TK 1 T PO QD, Disp: , Rfl: 5   Review of patient's allergies indicates:   Allergen Reactions    Sulfa (sulfonamide antibiotics)      Other reaction(s): Flushing (skin)      Social History     Tobacco Use    Smoking status: Never    Smokeless tobacco: Never   Substance Use Topics    Alcohol use: Yes     Comment: 1 drink per week      Family History   Problem Relation Age of Onset    Lupus Sister     Thyroid cancer Sister     Glaucoma Sister     Melanoma Father     Thyroid cancer Father     Diabetes Father     Cataracts Father     Diabetes Mother     Glaucoma Mother     Cataracts Mother     Hypertension Brother     Kidney failure Sister     Lupus Sister     Glaucoma Sister     Breast cancer Paternal Grandmother     Kidney disease Neg Hx     Psoriasis Neg Hx     Eczema Neg Hx     Acne Neg Hx     Colon cancer Neg Hx     Ovarian cancer Neg Hx     Thrombophilia Neg Hx     Thrombosis Neg Hx       Review of Systems   Genitourinary:  Negative for hot flashes.   Integumentary:  Negative for color change, rash, mole/lesion, breast mass, breast discharge and breast tenderness.   Breast: Negative for mass and tenderness   Physical Exam  Constitutional:       Appearance: Normal appearance.   HENT:      Head: Normocephalic.      Nose:  Nose normal.   Eyes:      Pupils: Pupils are equal, round, and reactive to light.   Cardiovascular:      Rate and Rhythm: Normal rate and regular rhythm.   Pulmonary:      Effort: Pulmonary effort is normal.   Chest:   Breasts:     Breasts are symmetrical.      Right: Bleeding and nipple discharge present. No inverted nipple or mass.      Left: Normal. No swelling, bleeding, inverted nipple, mass or nipple discharge.      Comments: Small amount of bloody discharge from the right otherwise normal exam  Abdominal:      Palpations: Abdomen is soft.   Musculoskeletal:         General: Normal range of motion.      Cervical back: Normal range of motion and neck supple. No tenderness.   Lymphadenopathy:      Cervical: No cervical adenopathy.   Skin:     General: Skin is warm.   Neurological:      General: No focal deficit present.      Mental Status: She is alert.      Pathology: 11/30/2023 right retroareolar ultrasound guided biopsy revealed intraductal papilloma    1. Intraductal papilloma of right breast  Overview:  She understands that anytime a papilloma is found at the time of core biopsy - that an excisional biopsy is needed to rule out an adjacent cancer. All R/B/I were discussed with the patient, and she wishes to proceed. PLAN:::: RIGHT BREAST BIOPSY AFTER NEEDLE LOCALIZATION.    I  will call her once we receive the final pathology results    Rx given for Norco 7.5/325mg 1 po q 6 hrs prn pain #10 with 0 refills      2. Bloody discharge from right nipple  Overview:  Same as above                 Medical Decision Making:  It is my impression that this patient suffers all conditions contained in this medical document.  Each of these conditions did affect our plan of care and my medical decision making today.  It is my opinion that the medical decision making concerning this patient was of moderate difficulty based on the aforementioned conditions.  Any further recommendations will be communicated to the patient by  me.  I have reviewed and verified her allergies, list of medications, medical and surgical histories, social history, and a pertinent review of symptoms.       Follow up:  1 week and prn    For:  Post Op

## 2023-02-28 ENCOUNTER — OFFICE VISIT (OUTPATIENT)
Dept: SURGERY | Facility: CLINIC | Age: 58
End: 2023-02-28
Payer: COMMERCIAL

## 2023-02-28 VITALS — WEIGHT: 139 LBS | BODY MASS INDEX: 22.34 KG/M2 | HEIGHT: 66 IN

## 2023-02-28 DIAGNOSIS — Z80.3 FAMILY HISTORY OF MALIGNANT NEOPLASM OF BREAST: ICD-10-CM

## 2023-02-28 DIAGNOSIS — N64.52 BLOODY DISCHARGE FROM RIGHT NIPPLE: ICD-10-CM

## 2023-02-28 DIAGNOSIS — D24.1 INTRADUCTAL PAPILLOMA OF RIGHT BREAST: Primary | ICD-10-CM

## 2023-02-28 PROCEDURE — 99213 OFFICE O/P EST LOW 20 MIN: CPT | Mod: S$GLB,,, | Performed by: NURSE PRACTITIONER

## 2023-02-28 PROCEDURE — 3008F BODY MASS INDEX DOCD: CPT | Mod: CPTII,S$GLB,, | Performed by: NURSE PRACTITIONER

## 2023-02-28 PROCEDURE — 1160F PR REVIEW ALL MEDS BY PRESCRIBER/CLIN PHARMACIST DOCUMENTED: ICD-10-PCS | Mod: CPTII,S$GLB,, | Performed by: NURSE PRACTITIONER

## 2023-02-28 PROCEDURE — 3008F PR BODY MASS INDEX (BMI) DOCUMENTED: ICD-10-PCS | Mod: CPTII,S$GLB,, | Performed by: NURSE PRACTITIONER

## 2023-02-28 PROCEDURE — 1159F MED LIST DOCD IN RCRD: CPT | Mod: CPTII,S$GLB,, | Performed by: NURSE PRACTITIONER

## 2023-02-28 PROCEDURE — 1160F RVW MEDS BY RX/DR IN RCRD: CPT | Mod: CPTII,S$GLB,, | Performed by: NURSE PRACTITIONER

## 2023-02-28 PROCEDURE — 1159F PR MEDICATION LIST DOCUMENTED IN MEDICAL RECORD: ICD-10-PCS | Mod: CPTII,S$GLB,, | Performed by: NURSE PRACTITIONER

## 2023-02-28 PROCEDURE — 99213 PR OFFICE/OUTPT VISIT, EST, LEVL III, 20-29 MIN: ICD-10-PCS | Mod: S$GLB,,, | Performed by: NURSE PRACTITIONER

## 2023-02-28 RX ORDER — HYDROCODONE BITARTRATE AND ACETAMINOPHEN 7.5; 325 MG/1; MG/1
1 TABLET ORAL EVERY 6 HOURS PRN
Qty: 10 TABLET | Refills: 0 | Status: SHIPPED | OUTPATIENT
Start: 2023-02-28 | End: 2023-02-28 | Stop reason: CLARIF

## 2023-02-28 RX ORDER — HYDROCODONE BITARTRATE AND ACETAMINOPHEN 7.5; 325 MG/1; MG/1
1 TABLET ORAL EVERY 6 HOURS PRN
Qty: 10 TABLET | Refills: 0 | Status: CANCELLED | OUTPATIENT
Start: 2023-02-28

## 2023-02-28 RX ORDER — HYDROCODONE BITARTRATE AND ACETAMINOPHEN 7.5; 325 MG/1; MG/1
1 TABLET ORAL EVERY 6 HOURS PRN
Qty: 10 TABLET | Refills: 0 | Status: SHIPPED | OUTPATIENT
Start: 2023-02-28 | End: 2023-10-30

## 2023-02-28 NOTE — PROGRESS NOTES
Significant time was spent with the patient of which more than 75% was spent on patient counseling, answering her questions, education of the above treatment regimen and coordination of medical care. Services provided to this patient were performed in the medical office of Dr. Sea Martins. These services were furnished under the physician's direct personal supervision.    MD NOTE::: R/BI/I and alternatives were discussed.  I will phone her with her Pathology Report.

## 2023-02-28 NOTE — ASSESSMENT & PLAN NOTE
She understands that anytime a papilloma is found at the time of core biopsy - that an excisional biopsy is needed to rule out an adjacent cancer. All R/B/I were discussed with the patient, and she wishes to proceed. PLAN:::: RIGHT BREAST BIOPSY AFTER NEEDLE LOCALIZATION.    We will call her once we receive the final patholgy results    RX given for Norco 7.5/325 mg 1 po q 6 hrs PRN pain #10 with 0 refill.

## 2023-03-01 ENCOUNTER — PATIENT MESSAGE (OUTPATIENT)
Dept: RHEUMATOLOGY | Facility: CLINIC | Age: 58
End: 2023-03-01
Payer: COMMERCIAL

## 2023-03-07 ENCOUNTER — OUTSIDE PLACE OF SERVICE (OUTPATIENT)
Dept: ADMINISTRATIVE | Facility: OTHER | Age: 58
End: 2023-03-07
Payer: COMMERCIAL

## 2023-03-07 PROCEDURE — 19125 PR EXCISE BREAST LES W XRAY MARKER: ICD-10-PCS | Mod: RT,,, | Performed by: SPECIALIST

## 2023-03-07 PROCEDURE — 19125 EXCISION BREAST LESION: CPT | Mod: RT,,, | Performed by: SPECIALIST

## 2023-03-19 NOTE — ASSESSMENT & PLAN NOTE
Pathology reports were discussed in detail. All questions were answered and recommendations were made for future treatments/follow-up. She understands the importance of monthly self-breast examination and is to report changes as they occur.

## 2023-03-19 NOTE — PROGRESS NOTES
[facility]    Chief Complaint:   Madeleine Nunez is a 58 y.o. female presenting today for a post operative visit.  She is status post Right NL breast biopsy.  She has done well post operatively and has no complaints.    History of Present Illness:   Mrs. Nunez first presented on November 29, 2022 with a bloody right nipple discharge (spontaneous) that occurred multiple times. Her last mammogram was normal. Ultrsound showed an intraductal mass and sonocore biopsy was consistent with an intraductal papilloma. Excision showed benign intraductal papillomas with benign changes. NEM. MD::: Sea Garnett MD.    Past Medical History:   Diagnosis Date    Anemia     Blood transfusion     Bloody discharge from right nipple 02/25/2023    Dry eyes     Family history of malignant neoplasm of breast 02/25/2023    Hypothyroidism 10/09/2012    Intraductal papilloma of right breast 02/25/2023    She understands that anytime a papilloma is found at the time of core biopsy - that an excisional biopsy is needed to rule out an adjacent cancer. All R/B/I were discussed with the patient, and she wishes to proceed. PLAN:::: RIGHT BREAST BIOPSY AFTER NEEDLE LOCALIZATION.    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    Osteoarthritis of left knee 04/25/2018    Substance abuse     Systemic lupus erythematosus       Past Surgical History:   Procedure Laterality Date    BLADDER SUSPENSION      CHOLECYSTECTOMY      HYSTEROSCOPY WITH DILATION AND CURETTAGE OF UTERUS  07/27/2022    INNER EAR SURGERY      shunt        Current Outpatient Medications:     azaTHIOprine (IMURAN) 50 mg Tab, Take 1 tablet (50 mg total) by mouth 2 (two) times daily., Disp: 60 tablet, Rfl: 1    belimumab (BENLYSTA) 200 mg/mL AtIn, Inject 1 mL (200 mg total) into the skin every 7 days., Disp: 4 each, Rfl: 5    betahistine HCl  (BETAHISTINE, BULK, MISC), 14 mg by Misc.(Non-Drug; Combo Route) route 3 (three) times daily. , Disp: , Rfl:     diclofenac sodium (SOLARAZE) 3 % gel, APPLY LIBERALLY TO AFFECTED PAINFUL JOINT 4 TIMES A DAY, Disp: 300 g, Rfl: 1    estradioL (ESTRACE) 2 MG tablet, TAKE 1 TABLET (2 MG TOTAL) BY MOUTH ONCE DAILY., Disp: 30 tablet, Rfl: 1    HYDROcodone-acetaminophen (NORCO) 7.5-325 mg per tablet, Take 1 tablet by mouth every 6 (six) hours as needed for Pain., Disp: 10 tablet, Rfl: 0    levothyroxine (SYNTHROID) 100 MCG tablet, Take 1 tablet by mouth once daily., Disp: , Rfl:     progesterone (PROMETRIUM) 200 MG capsule, TAKE 1 CAPSULE BY MOUTH EVERY EVENING FROM 7-9PM, Disp: , Rfl: 6    sertraline (ZOLOFT) 50 MG tablet, , Disp: , Rfl:     spironolactone (ALDACTONE) 50 MG tablet, TK 1 T PO QD, Disp: , Rfl: 5   Review of patient's allergies indicates:   Allergen Reactions    Sulfa (sulfonamide antibiotics)      Other reaction(s): Flushing (skin)      Social History     Tobacco Use    Smoking status: Never    Smokeless tobacco: Never   Substance Use Topics    Alcohol use: Yes     Comment: 1 drink per week      Family History   Problem Relation Age of Onset    Lupus Sister     Thyroid cancer Sister     Glaucoma Sister     Melanoma Father     Thyroid cancer Father     Diabetes Father     Cataracts Father     Diabetes Mother     Glaucoma Mother     Cataracts Mother     Hypertension Brother     Kidney failure Sister     Lupus Sister     Glaucoma Sister     Breast cancer Paternal Grandmother     Kidney disease Neg Hx     Psoriasis Neg Hx     Eczema Neg Hx     Acne Neg Hx     Colon cancer Neg Hx     Ovarian cancer Neg Hx     Thrombophilia Neg Hx     Thrombosis Neg Hx       Review of Systems   Constitutional:  Positive for activity change (slowed down after surgery). Negative for fever.   Respiratory:  Negative for shortness of breath.    Cardiovascular:  Negative for chest pain.   Integumentary:  Positive for breast tenderness  (typical post operative). Negative for rash and wound.   Breast: Positive for tenderness (typical post operative).   Physical Exam   Pulmonary/Chest: Effort normal. She exhibits tenderness. She exhibits no swelling. Right breast exhibits no mass.   Nomal post op changes to the right. Left- Deferred   Abdominal: Normal appearance.   Skin: No rash noted.         Assessment/Plan  1. Intraductal papilloma of right breast  Assessment & Plan:  Pathology reports were discussed in detail. All questions were answered and recommendations were made for future treatments/follow-up. She understands the importance of monthly self-breast examination and is to report changes as they occur.        2. Family history of malignant neoplasm of breast  Assessment & Plan:  We discussed her family history and how it could impact her own future risks.  We discussed family vs. genetic history and the importance of each.  All questions answered.                  Medical Decision Making:  It is my impression that this patient suffers all conditions contained in this medical document.  Each of these conditions did affect our plan of care and my medical decision making today.  It is my opinion that the medical decision making concerning this patient was of minimal  difficulty based on the aforementioned conditions.  Any further recommendations will be communicated to the patient by me.  I have reviewed and verified her allergies, list of medications, medical and surgical histories, social history, and a pertinent review of symptoms.         Follow up:  6 months and prn    For:  PE and MICHELLE (ARTHUR) at [facility]

## 2023-03-19 NOTE — ASSESSMENT & PLAN NOTE
We discussed her family history and how it could impact her own future risks.  We discussed family vs. genetic history and the importance of each.  All questions answered.

## 2023-03-21 ENCOUNTER — OFFICE VISIT (OUTPATIENT)
Dept: SURGERY | Facility: CLINIC | Age: 58
End: 2023-03-21
Payer: COMMERCIAL

## 2023-03-21 DIAGNOSIS — Z80.3 FAMILY HISTORY OF MALIGNANT NEOPLASM OF BREAST: ICD-10-CM

## 2023-03-21 DIAGNOSIS — D24.1 INTRADUCTAL PAPILLOMA OF RIGHT BREAST: Primary | ICD-10-CM

## 2023-03-21 PROCEDURE — 99024 POSTOP FOLLOW-UP VISIT: CPT | Mod: S$GLB,,, | Performed by: NURSE PRACTITIONER

## 2023-03-21 PROCEDURE — 1159F MED LIST DOCD IN RCRD: CPT | Mod: CPTII,S$GLB,, | Performed by: NURSE PRACTITIONER

## 2023-03-21 PROCEDURE — 1160F RVW MEDS BY RX/DR IN RCRD: CPT | Mod: CPTII,S$GLB,, | Performed by: NURSE PRACTITIONER

## 2023-03-21 PROCEDURE — 1159F PR MEDICATION LIST DOCUMENTED IN MEDICAL RECORD: ICD-10-PCS | Mod: CPTII,S$GLB,, | Performed by: NURSE PRACTITIONER

## 2023-03-21 PROCEDURE — 1160F PR REVIEW ALL MEDS BY PRESCRIBER/CLIN PHARMACIST DOCUMENTED: ICD-10-PCS | Mod: CPTII,S$GLB,, | Performed by: NURSE PRACTITIONER

## 2023-03-21 PROCEDURE — 99024 PR POST-OP FOLLOW-UP VISIT: ICD-10-PCS | Mod: S$GLB,,, | Performed by: NURSE PRACTITIONER

## 2023-03-29 ENCOUNTER — TELEPHONE (OUTPATIENT)
Dept: RHEUMATOLOGY | Facility: CLINIC | Age: 58
End: 2023-03-29
Payer: COMMERCIAL

## 2023-03-29 ENCOUNTER — PATIENT MESSAGE (OUTPATIENT)
Dept: RHEUMATOLOGY | Facility: CLINIC | Age: 58
End: 2023-03-29
Payer: COMMERCIAL

## 2023-03-29 NOTE — TELEPHONE ENCOUNTER
Please schedule standing lupus labs on or about 4/12/23 and needs f/u appt with me sometime in April. Thank you MINA

## 2023-04-05 ENCOUNTER — PATIENT MESSAGE (OUTPATIENT)
Dept: RHEUMATOLOGY | Facility: CLINIC | Age: 58
End: 2023-04-05
Payer: COMMERCIAL

## 2023-04-20 ENCOUNTER — PATIENT MESSAGE (OUTPATIENT)
Dept: RHEUMATOLOGY | Facility: CLINIC | Age: 58
End: 2023-04-20
Payer: COMMERCIAL

## 2023-04-24 ENCOUNTER — LAB VISIT (OUTPATIENT)
Dept: LAB | Facility: HOSPITAL | Age: 58
End: 2023-04-24
Attending: INTERNAL MEDICINE
Payer: COMMERCIAL

## 2023-04-24 DIAGNOSIS — M32.9 SYSTEMIC LUPUS ERYTHEMATOSUS, UNSPECIFIED SLE TYPE, UNSPECIFIED ORGAN INVOLVEMENT STATUS: ICD-10-CM

## 2023-04-24 LAB
ALBUMIN SERPL BCP-MCNC: 3.6 G/DL (ref 3.5–5.2)
ALP SERPL-CCNC: 66 U/L (ref 55–135)
ALT SERPL W/O P-5'-P-CCNC: 17 U/L (ref 10–44)
ANION GAP SERPL CALC-SCNC: 7 MMOL/L (ref 8–16)
AST SERPL-CCNC: 26 U/L (ref 10–40)
BASOPHILS # BLD AUTO: 0.03 K/UL (ref 0–0.2)
BASOPHILS NFR BLD: 0.7 % (ref 0–1.9)
BILIRUB SERPL-MCNC: 0.3 MG/DL (ref 0.1–1)
BUN SERPL-MCNC: 13 MG/DL (ref 6–20)
CALCIUM SERPL-MCNC: 9.1 MG/DL (ref 8.7–10.5)
CHLORIDE SERPL-SCNC: 105 MMOL/L (ref 95–110)
CO2 SERPL-SCNC: 27 MMOL/L (ref 23–29)
CREAT SERPL-MCNC: 0.7 MG/DL (ref 0.5–1.4)
CRP SERPL-MCNC: 0.5 MG/L (ref 0–8.2)
DIFFERENTIAL METHOD: ABNORMAL
EOSINOPHIL # BLD AUTO: 0.1 K/UL (ref 0–0.5)
EOSINOPHIL NFR BLD: 1.3 % (ref 0–8)
ERYTHROCYTE [DISTWIDTH] IN BLOOD BY AUTOMATED COUNT: 12.9 % (ref 11.5–14.5)
ERYTHROCYTE [SEDIMENTATION RATE] IN BLOOD BY PHOTOMETRIC METHOD: <2 MM/HR (ref 0–36)
EST. GFR  (NO RACE VARIABLE): >60 ML/MIN/1.73 M^2
GLUCOSE SERPL-MCNC: 87 MG/DL (ref 70–110)
HCT VFR BLD AUTO: 40.9 % (ref 37–48.5)
HGB BLD-MCNC: 13.4 G/DL (ref 12–16)
IMM GRANULOCYTES # BLD AUTO: 0.02 K/UL (ref 0–0.04)
IMM GRANULOCYTES NFR BLD AUTO: 0.4 % (ref 0–0.5)
LYMPHOCYTES # BLD AUTO: 0.5 K/UL (ref 1–4.8)
LYMPHOCYTES NFR BLD: 11.5 % (ref 18–48)
MCH RBC QN AUTO: 31.9 PG (ref 27–31)
MCHC RBC AUTO-ENTMCNC: 32.8 G/DL (ref 32–36)
MCV RBC AUTO: 97 FL (ref 82–98)
MONOCYTES # BLD AUTO: 0.4 K/UL (ref 0.3–1)
MONOCYTES NFR BLD: 8.6 % (ref 4–15)
NEUTROPHILS # BLD AUTO: 3.5 K/UL (ref 1.8–7.7)
NEUTROPHILS NFR BLD: 77.5 % (ref 38–73)
NRBC BLD-RTO: 0 /100 WBC
PLATELET # BLD AUTO: 263 K/UL (ref 150–450)
PMV BLD AUTO: 11.3 FL (ref 9.2–12.9)
POTASSIUM SERPL-SCNC: 4.4 MMOL/L (ref 3.5–5.1)
PROT SERPL-MCNC: 6.5 G/DL (ref 6–8.4)
RBC # BLD AUTO: 4.2 M/UL (ref 4–5.4)
SODIUM SERPL-SCNC: 139 MMOL/L (ref 136–145)
WBC # BLD AUTO: 4.51 K/UL (ref 3.9–12.7)

## 2023-04-24 PROCEDURE — 86160 COMPLEMENT ANTIGEN: CPT | Performed by: INTERNAL MEDICINE

## 2023-04-24 PROCEDURE — 36415 COLL VENOUS BLD VENIPUNCTURE: CPT | Mod: PN | Performed by: INTERNAL MEDICINE

## 2023-04-24 PROCEDURE — 86140 C-REACTIVE PROTEIN: CPT | Performed by: INTERNAL MEDICINE

## 2023-04-24 PROCEDURE — 86225 DNA ANTIBODY NATIVE: CPT | Performed by: INTERNAL MEDICINE

## 2023-04-24 PROCEDURE — 85652 RBC SED RATE AUTOMATED: CPT | Performed by: INTERNAL MEDICINE

## 2023-04-24 PROCEDURE — 86160 COMPLEMENT ANTIGEN: CPT | Mod: 59 | Performed by: INTERNAL MEDICINE

## 2023-04-24 PROCEDURE — 80053 COMPREHEN METABOLIC PANEL: CPT | Performed by: INTERNAL MEDICINE

## 2023-04-24 PROCEDURE — 85025 COMPLETE CBC W/AUTO DIFF WBC: CPT | Performed by: INTERNAL MEDICINE

## 2023-04-25 LAB
C3 SERPL-MCNC: 95 MG/DL (ref 50–180)
C4 SERPL-MCNC: 15 MG/DL (ref 11–44)
DSDNA AB SER-ACNC: NORMAL [IU]/ML

## 2023-05-19 DIAGNOSIS — M32.19 OTHER SYSTEMIC LUPUS ERYTHEMATOSUS WITH OTHER ORGAN INVOLVEMENT: ICD-10-CM

## 2023-05-19 RX ORDER — AZATHIOPRINE 50 MG/1
50 TABLET ORAL 2 TIMES DAILY
Qty: 60 TABLET | Refills: 1 | Status: SHIPPED | OUTPATIENT
Start: 2023-05-19 | End: 2023-06-22 | Stop reason: SDUPTHER

## 2023-06-13 DIAGNOSIS — M32.9 SYSTEMIC LUPUS ERYTHEMATOSUS, UNSPECIFIED SLE TYPE, UNSPECIFIED ORGAN INVOLVEMENT STATUS: ICD-10-CM

## 2023-06-13 RX ORDER — BELIMUMAB 200 MG/ML
SOLUTION SUBCUTANEOUS
Qty: 4 EACH | Refills: 5 | Status: SHIPPED | OUTPATIENT
Start: 2023-06-13 | End: 2023-10-30 | Stop reason: SDUPTHER

## 2023-06-22 ENCOUNTER — LAB VISIT (OUTPATIENT)
Dept: LAB | Facility: HOSPITAL | Age: 58
End: 2023-06-22
Attending: INTERNAL MEDICINE
Payer: COMMERCIAL

## 2023-06-22 ENCOUNTER — OFFICE VISIT (OUTPATIENT)
Dept: RHEUMATOLOGY | Facility: CLINIC | Age: 58
End: 2023-06-22
Payer: COMMERCIAL

## 2023-06-22 VITALS
WEIGHT: 141.75 LBS | BODY MASS INDEX: 22.78 KG/M2 | HEART RATE: 65 BPM | SYSTOLIC BLOOD PRESSURE: 112 MMHG | DIASTOLIC BLOOD PRESSURE: 70 MMHG | HEIGHT: 66 IN

## 2023-06-22 DIAGNOSIS — M32.9 SYSTEMIC LUPUS ERYTHEMATOSUS, UNSPECIFIED SLE TYPE, UNSPECIFIED ORGAN INVOLVEMENT STATUS: Primary | ICD-10-CM

## 2023-06-22 DIAGNOSIS — M32.19 OTHER SYSTEMIC LUPUS ERYTHEMATOSUS WITH OTHER ORGAN INVOLVEMENT: ICD-10-CM

## 2023-06-22 DIAGNOSIS — M32.9 SYSTEMIC LUPUS ERYTHEMATOSUS, UNSPECIFIED SLE TYPE, UNSPECIFIED ORGAN INVOLVEMENT STATUS: ICD-10-CM

## 2023-06-22 DIAGNOSIS — Z78.0 MENOPAUSE: ICD-10-CM

## 2023-06-22 LAB
ALBUMIN SERPL BCP-MCNC: 3.8 G/DL (ref 3.5–5.2)
ALP SERPL-CCNC: 59 U/L (ref 55–135)
ALT SERPL W/O P-5'-P-CCNC: 8 U/L (ref 10–44)
ANION GAP SERPL CALC-SCNC: 7 MMOL/L (ref 8–16)
AST SERPL-CCNC: 16 U/L (ref 10–40)
BASOPHILS # BLD AUTO: 0.04 K/UL (ref 0–0.2)
BASOPHILS NFR BLD: 1.4 % (ref 0–1.9)
BILIRUB SERPL-MCNC: 0.4 MG/DL (ref 0.1–1)
BUN SERPL-MCNC: 13 MG/DL (ref 6–20)
C3 SERPL-MCNC: 108 MG/DL (ref 50–180)
C4 SERPL-MCNC: 16 MG/DL (ref 11–44)
CALCIUM SERPL-MCNC: 8.8 MG/DL (ref 8.7–10.5)
CHLORIDE SERPL-SCNC: 105 MMOL/L (ref 95–110)
CO2 SERPL-SCNC: 29 MMOL/L (ref 23–29)
CREAT SERPL-MCNC: 0.7 MG/DL (ref 0.5–1.4)
CRP SERPL-MCNC: 0.6 MG/L (ref 0–8.2)
CRP SERPL-MCNC: 0.6 MG/L (ref 0–8.2)
DIFFERENTIAL METHOD: ABNORMAL
EOSINOPHIL # BLD AUTO: 0 K/UL (ref 0–0.5)
EOSINOPHIL NFR BLD: 1.4 % (ref 0–8)
ERYTHROCYTE [DISTWIDTH] IN BLOOD BY AUTOMATED COUNT: 12.7 % (ref 11.5–14.5)
ERYTHROCYTE [SEDIMENTATION RATE] IN BLOOD BY PHOTOMETRIC METHOD: 3 MM/HR (ref 0–36)
EST. GFR  (NO RACE VARIABLE): >60 ML/MIN/1.73 M^2
GLUCOSE SERPL-MCNC: 89 MG/DL (ref 70–110)
HCT VFR BLD AUTO: 44.5 % (ref 37–48.5)
HGB BLD-MCNC: 15 G/DL (ref 12–16)
IMM GRANULOCYTES # BLD AUTO: 0.01 K/UL (ref 0–0.04)
IMM GRANULOCYTES NFR BLD AUTO: 0.3 % (ref 0–0.5)
LYMPHOCYTES # BLD AUTO: 0.6 K/UL (ref 1–4.8)
LYMPHOCYTES NFR BLD: 19.2 % (ref 18–48)
MCH RBC QN AUTO: 32.1 PG (ref 27–31)
MCHC RBC AUTO-ENTMCNC: 33.7 G/DL (ref 32–36)
MCV RBC AUTO: 95 FL (ref 82–98)
MONOCYTES # BLD AUTO: 0.3 K/UL (ref 0.3–1)
MONOCYTES NFR BLD: 10.8 % (ref 4–15)
NEUTROPHILS # BLD AUTO: 1.9 K/UL (ref 1.8–7.7)
NEUTROPHILS NFR BLD: 66.9 % (ref 38–73)
NRBC BLD-RTO: 0 /100 WBC
PLATELET # BLD AUTO: 229 K/UL (ref 150–450)
PMV BLD AUTO: 10.3 FL (ref 9.2–12.9)
POTASSIUM SERPL-SCNC: 4.3 MMOL/L (ref 3.5–5.1)
PROT SERPL-MCNC: 6.9 G/DL (ref 6–8.4)
RBC # BLD AUTO: 4.68 M/UL (ref 4–5.4)
RESEARCH LAB: NORMAL
SODIUM SERPL-SCNC: 141 MMOL/L (ref 136–145)
WBC # BLD AUTO: 2.86 K/UL (ref 3.9–12.7)

## 2023-06-22 PROCEDURE — 3078F DIAST BP <80 MM HG: CPT | Mod: CPTII,S$GLB,, | Performed by: INTERNAL MEDICINE

## 2023-06-22 PROCEDURE — 86160 COMPLEMENT ANTIGEN: CPT | Mod: 59 | Performed by: INTERNAL MEDICINE

## 2023-06-22 PROCEDURE — 36415 COLL VENOUS BLD VENIPUNCTURE: CPT | Performed by: INTERNAL MEDICINE

## 2023-06-22 PROCEDURE — 99999 PR PBB SHADOW E&M-EST. PATIENT-LVL III: CPT | Mod: PBBFAC,,, | Performed by: INTERNAL MEDICINE

## 2023-06-22 PROCEDURE — 86140 C-REACTIVE PROTEIN: CPT | Performed by: INTERNAL MEDICINE

## 2023-06-22 PROCEDURE — 86160 COMPLEMENT ANTIGEN: CPT | Performed by: INTERNAL MEDICINE

## 2023-06-22 PROCEDURE — 99214 OFFICE O/P EST MOD 30 MIN: CPT | Mod: S$GLB,,, | Performed by: INTERNAL MEDICINE

## 2023-06-22 PROCEDURE — 3078F PR MOST RECENT DIASTOLIC BLOOD PRESSURE < 80 MM HG: ICD-10-PCS | Mod: CPTII,S$GLB,, | Performed by: INTERNAL MEDICINE

## 2023-06-22 PROCEDURE — 3074F SYST BP LT 130 MM HG: CPT | Mod: CPTII,S$GLB,, | Performed by: INTERNAL MEDICINE

## 2023-06-22 PROCEDURE — 3074F PR MOST RECENT SYSTOLIC BLOOD PRESSURE < 130 MM HG: ICD-10-PCS | Mod: CPTII,S$GLB,, | Performed by: INTERNAL MEDICINE

## 2023-06-22 PROCEDURE — 86225 DNA ANTIBODY NATIVE: CPT | Performed by: INTERNAL MEDICINE

## 2023-06-22 PROCEDURE — 80053 COMPREHEN METABOLIC PANEL: CPT | Performed by: INTERNAL MEDICINE

## 2023-06-22 PROCEDURE — 99999 PR PBB SHADOW E&M-EST. PATIENT-LVL III: ICD-10-PCS | Mod: PBBFAC,,, | Performed by: INTERNAL MEDICINE

## 2023-06-22 PROCEDURE — 3008F BODY MASS INDEX DOCD: CPT | Mod: CPTII,S$GLB,, | Performed by: INTERNAL MEDICINE

## 2023-06-22 PROCEDURE — 3008F PR BODY MASS INDEX (BMI) DOCUMENTED: ICD-10-PCS | Mod: CPTII,S$GLB,, | Performed by: INTERNAL MEDICINE

## 2023-06-22 PROCEDURE — 85025 COMPLETE CBC W/AUTO DIFF WBC: CPT | Performed by: INTERNAL MEDICINE

## 2023-06-22 PROCEDURE — 99214 PR OFFICE/OUTPT VISIT, EST, LEVL IV, 30-39 MIN: ICD-10-PCS | Mod: S$GLB,,, | Performed by: INTERNAL MEDICINE

## 2023-06-22 PROCEDURE — 85652 RBC SED RATE AUTOMATED: CPT | Performed by: INTERNAL MEDICINE

## 2023-06-22 RX ORDER — AZATHIOPRINE 50 MG/1
50 TABLET ORAL 2 TIMES DAILY
Qty: 60 TABLET | Refills: 1 | Status: SHIPPED | OUTPATIENT
Start: 2023-06-22 | End: 2023-09-14

## 2023-06-22 ASSESSMENT — ROUTINE ASSESSMENT OF PATIENT INDEX DATA (RAPID3)
PAIN SCORE: 4
PSYCHOLOGICAL DISTRESS SCORE: 1.1
TOTAL RAPID3 SCORE: 2.78
WHEN YOU AWAKENED IN THE MORNING OVER THE LAST WEEK, PLEASE INDICATE THE AMOUNT OF TIME IT TAKES UNTIL YOU ARE AS LIMBER AS YOU WILL BE FOR THE DAY: 1
FATIGUE SCORE: 4.5
PATIENT GLOBAL ASSESSMENT SCORE: 3
MDHAQ FUNCTION SCORE: 0.4
AM STIFFNESS SCORE: 1, YES

## 2023-06-22 ASSESSMENT — SYSTEMIC LUPUS ERYTHEMATOSUS DISEASE ACTIVITY INDEX (SLEDAI): TOTAL_SCORE: 0

## 2023-06-22 NOTE — PROGRESS NOTES
Rapid3 Question Responses and Scores 6/21/2023   MDHAQ Score 0.4   Psychologic Score 1.1   Pain Score 4   When you awakened in the morning OVER THE LAST WEEK, did you feel stiff? Yes   If Yes, please indicate the number of hours until you are as limber as you will be for the day 1   Fatigue Score 4.5   Global Health Score 3   RAPID3 Score 2.78

## 2023-06-22 NOTE — PROGRESS NOTES
Patient ID:  Madeleine Nunez    YOB: 1965     MRN:  [id number]     Subjective:     Chief Complaint:  Disease Management     History of Present Illness:  Pt is a 58 y.o. female with lupus who presents today for f/u. LCV was 11/30/22. At that time she was on prednisone for Meniere's and to cont following with ENT. She was doing well from Lupus standpoint.     Today: She returns to clinic stating she has been well overall. Earlier in the year, she underwent shunt placement for Meniere's disease in January in [facility], and robotic hysterectomy in March in  with breast nodule biopsy at the same time. During this time, she stopped her azathioprine one week prior and two week after the surgeries. After her surgeries, her joint pain increased. She has been taking her medication regularly since March 21, and she states her joint pains have been improving since. She reports some morning stiffness that does not last longer than an hour. She is experiencing some left elbow pain that is worse with extension of her wrist.  Exercising 5 days weekly; lifting weights and jazzercise for aerobic activity. Follows a low salt diet for Meniere's. She has had some vaginal bleeding following hysterectomy for which she will follow up with OBGYN in [place] next week. Prior to her surgery, she had pre-op EKG that showed occasional PVCs and PACs for which she will follow with cardiology on Monday.     Denies hair loss, dry eyes, vision changes, dry mouth, oral/nasal ulcers, trouble swallowing, new rashes, or GI disturbances.      Review of Systems   Review of Systems   Constitutional:  Negative for fatigue, fever and unexpected weight change.   HENT:  Negative for congestion, mouth sores, rhinorrhea, sore throat and trouble swallowing.    Eyes:  Negative for photophobia, redness and visual disturbance.   Respiratory:  Negative for cough, chest tightness, shortness of breath and wheezing.    Cardiovascular:  Negative for chest pain  and palpitations.   Gastrointestinal:  Negative for abdominal pain, constipation, diarrhea, nausea and vomiting.   Genitourinary:  Negative for dysuria and genital sores.   Musculoskeletal:  Positive for arthralgias and joint swelling. Negative for gait problem.   Skin:  Negative for rash and wound.   Neurological:  Negative for weakness, numbness and headaches.   Hematological:  Does not bruise/bleed easily.   Psychiatric/Behavioral:  Negative for agitation.       Current Medications:    Current Outpatient Medications:     BENLYSTA 200 mg/mL AtIn, INJECT 1 PEN UNDER THE SKIN EVERY 7 DAYS., Disp: 4 each, Rfl: 5    betahistine HCl (BETAHISTINE, BULK, MISC), 14 mg by Misc.(Non-Drug; Combo Route) route 3 (three) times daily. , Disp: , Rfl:     estradioL (ESTRACE) 2 MG tablet, Take 1 tablet (2 mg total) by mouth once daily., Disp: 30 tablet, Rfl: 6    spironolactone (ALDACTONE) 50 MG tablet, TK 1 T PO QD, Disp: , Rfl: 5    azaTHIOprine (IMURAN) 50 mg Tab, Take 1 tablet (50 mg total) by mouth 2 (two) times daily., Disp: 60 tablet, Rfl: 1    diclofenac sodium (SOLARAZE) 3 % gel, APPLY LIBERALLY TO AFFECTED PAINFUL JOINT 4 TIMES A DAY (Patient not taking: Reported on 6/22/2023), Disp: 300 g, Rfl: 1    HYDROcodone-acetaminophen (NORCO) 7.5-325 mg per tablet, Take 1 tablet by mouth every 6 (six) hours as needed for Pain., Disp: 10 tablet, Rfl: 0    levothyroxine (SYNTHROID) 100 MCG tablet, Take 1 tablet by mouth once daily., Disp: , Rfl:     progesterone (PROMETRIUM) 200 MG capsule, TAKE 1 CAPSULE BY MOUTH EVERY EVENING FROM 7-9PM, Disp: , Rfl: 6    sertraline (ZOLOFT) 50 MG tablet, , Disp: , Rfl:      Objective:     Vitals:    06/22/23 0944   BP: 112/70   Pulse: 65      Body mass index is 22.88 kg/m².     Physical Exam   Constitutional: She is oriented to person, place, and time. normal appearance. No distress.   HENT:   Head: Normocephalic and atraumatic.   Nose: Nose normal.   Mouth/Throat: Mucous membranes are moist.  Oropharynx is clear.   Eyes: Conjunctivae are normal.   Cardiovascular: Normal rate, regular rhythm and normal pulses.   No murmur heard.  Pulmonary/Chest: Effort normal and breath sounds normal. No respiratory distress.   Abdominal: Soft. Bowel sounds are normal. There is no abdominal tenderness.   Musculoskeletal:         General: No swelling, tenderness or deformity. Normal range of motion.   Neurological: She is alert and oriented to person, place, and time.   Skin: Skin is warm and dry. No rash noted.   Psychiatric: Mood normal.        2/9/2022 6/22/2023   Tender (RAO-28) 1 / 28  3 / 28    Swollen (RAO-28) 1 / 28 0 / 28    Provider Global -- --   Patient Global 0 mm 30 mm   ESR 3 mm/hr --   CRP 0.3 mg/L --   RAO-28 (ESR) 1.61 (Remission) --   RAO-28 (CRP) 1.89 (Remission) --   CDAI Score -- --                   Latest Reference Range & Units 01/12/23 08:38   Cholesterol 120 - 199 mg/dL 173   HDL 40 - 75 mg/dL 50   HDL/Cholesterol Ratio 20.0 - 50.0 % 28.9   LDL Cholesterol External 63.0 - 159.0 mg/dL 98.8   Non-HDL Cholesterol mg/dL 123   Total Cholesterol/HDL Ratio 2.0 - 5.0  3.5   Triglycerides 30 - 150 mg/dL 121   The 10-year ASCVD risk score (Tegan KING, et al., 2019) is: 1.6%    Values used to calculate the score:      Age: 58 years      Sex: Female      Is Non- : No      Diabetic: No      Tobacco smoker: No      Systolic Blood Pressure: 106 mmHg      Is BP treated: No      HDL Cholesterol: 50 mg/dL      Total Cholesterol: 173 mg/dL     Assessment:         SLE: SLEDAI 0 pending all labs . Tapered off prednisone as of 4/4/21 after 30 years for SLE.   OA hands, left knee asymptomatic   moderate lymphopenia: azathioprine>SLE  Asymptomatic pyuria  PMB s/p  hysterectomy  Meniere's Disease s/p shunt procedure  S/p breast biopsy, benign    1. Systemic lupus erythematosus, unspecified SLE type, unspecified organ involvement status    2. Menopause    3. Other systemic lupus erythematosus with  "other organ involvement          Plan:      Problem List Items Addressed This Visit          Immunology/Multi System    SLE (systemic lupus erythematosus) - Primary    Relevant Medications    azaTHIOprine (IMURAN) 50 mg Tab    Other Relevant Orders    Research Lab     Other Visit Diagnoses       Menopause        Relevant Orders    DXA Bone Density Axial Skeleton 1 or more sites              Standing lupus labs + 2 additional purple tops today  2nd bivalent Covid booster, hold azathioprine and Benlysta for one week post vaccination   Cont azathioprine 50mg twice daily  Cont belimumab 200mg sc q wk for lupus arthritis and as steroid   sparing agent  Diclofenac gel prn for OA joints  1200 mg dietary calcium daily per handout(avoid tabs given h/o kidney stones)  Vitamin D 3 1000 units daily  F/u Dr. Finley for [facility]'s   Consented for "Role of Body Weight on Autoimmunity, Proinflammation and Microbiome in SLE" by myself witnessed by Dr. Perez  RT 3 months with standing lupus labs and DXA     Jose Perez M.D.  PGY-1  [employer] [facility]      "

## 2023-06-22 NOTE — PROGRESS NOTES
"I have personally taken the history and examined the patient and agree with the resident,s note as stated above           Latest Reference Range & Units 01/12/23 08:38   Cholesterol 120 - 199 mg/dL 173   HDL 40 - 75 mg/dL 50   HDL/Cholesterol Ratio 20.0 - 50.0 % 28.9   LDL Cholesterol External 63.0 - 159.0 mg/dL 98.8   Non-HDL Cholesterol mg/dL 123   Total Cholesterol/HDL Ratio 2.0 - 5.0  3.5   Triglycerides 30 - 150 mg/dL 121   The 10-year ASCVD risk score (Tegan DK, et al., 2019) is: 1.6%    Values used to calculate the score:      Age: 58 years      Sex: Female      Is Non- : No      Diabetic: No      Tobacco smoker: No      Systolic Blood Pressure: 106 mmHg      Is BP treated: No      HDL Cholesterol: 50 mg/dL      Total Cholesterol: 173 mg/dL            SLE: SLEDAI 0 pending all labs . Tapered off prednisone as of 4/4/21 after 30 years for SLE.   OA hands, left knee asymptomatic   moderate lymphopenia: azathioprine>SLE  Asymptomatic pyuria  PMB s/p  hysterectomy  Meniere's Disease s/p shunt procedure  S/p breast biopsy, benign        Standing lupus labs + 2 additional purple tops today  2nd bivalent Covid booster, hold azathioprine and Benlysta for one week post vaccination   Cont azathioprine 50mg twice daily  Cont belimumab 200mg sc q wk for lupus arthritis and as steroid   sparing agent  Diclofenac gel prn for OA joints  1200 mg dietary calcium daily per handout(avoid tabs given h/o kidney stones)  Vitamin D 3 1000 units daily  F/u Dr. Finley for Meniere's   Consented for "Role of Body Weight on Autoimmunity, Proinflammation and Microbiome in SLE" by myself witnessed by Dr. Perez  RTAVERY 3 months with standing lupus labs and DXA    Answers submitted by the patient for this visit:  Rheumatology Questionnaire (Submitted on 6/21/2023)  fever: No  eye redness: No  mouth sores: No  headaches: No  shortness of breath: No  chest pain: No  trouble swallowing: No  diarrhea: No  constipation: " No  unexpected weight change: No  genital sore: No  dysuria: No  During the last 3 days, have you had a skin rash?: No  Bruises or bleeds easily: No  cough: No

## 2023-06-23 LAB — DSDNA AB SER-ACNC: NORMAL [IU]/ML

## 2023-07-05 ENCOUNTER — TELEPHONE (OUTPATIENT)
Dept: RHEUMATOLOGY | Facility: CLINIC | Age: 58
End: 2023-07-05
Payer: COMMERCIAL

## 2023-07-05 ENCOUNTER — PATIENT MESSAGE (OUTPATIENT)
Dept: RHEUMATOLOGY | Facility: CLINIC | Age: 58
End: 2023-07-05
Payer: COMMERCIAL

## 2023-07-05 DIAGNOSIS — R82.81 PYURIA: ICD-10-CM

## 2023-07-05 DIAGNOSIS — D72.819 LEUKOPENIA, UNSPECIFIED TYPE: Primary | ICD-10-CM

## 2023-07-06 ENCOUNTER — PATIENT MESSAGE (OUTPATIENT)
Dept: RHEUMATOLOGY | Facility: CLINIC | Age: 58
End: 2023-07-06
Payer: COMMERCIAL

## 2023-07-06 ENCOUNTER — LAB VISIT (OUTPATIENT)
Dept: LAB | Facility: HOSPITAL | Age: 58
End: 2023-07-06
Attending: INTERNAL MEDICINE
Payer: COMMERCIAL

## 2023-07-06 ENCOUNTER — TELEPHONE (OUTPATIENT)
Dept: RHEUMATOLOGY | Facility: CLINIC | Age: 58
End: 2023-07-06
Payer: COMMERCIAL

## 2023-07-06 DIAGNOSIS — D72.819 LEUKOPENIA, UNSPECIFIED TYPE: ICD-10-CM

## 2023-07-06 LAB
BASOPHILS # BLD AUTO: 0.03 K/UL (ref 0–0.2)
BASOPHILS NFR BLD: 0.9 % (ref 0–1.9)
DIFFERENTIAL METHOD: ABNORMAL
EOSINOPHIL # BLD AUTO: 0 K/UL (ref 0–0.5)
EOSINOPHIL NFR BLD: 1.1 % (ref 0–8)
ERYTHROCYTE [DISTWIDTH] IN BLOOD BY AUTOMATED COUNT: 12.4 % (ref 11.5–14.5)
HCT VFR BLD AUTO: 43.3 % (ref 37–48.5)
HGB BLD-MCNC: 14.3 G/DL (ref 12–16)
IMM GRANULOCYTES # BLD AUTO: 0.01 K/UL (ref 0–0.04)
IMM GRANULOCYTES NFR BLD AUTO: 0.3 % (ref 0–0.5)
LYMPHOCYTES # BLD AUTO: 0.6 K/UL (ref 1–4.8)
LYMPHOCYTES NFR BLD: 16 % (ref 18–48)
MCH RBC QN AUTO: 31.4 PG (ref 27–31)
MCHC RBC AUTO-ENTMCNC: 33 G/DL (ref 32–36)
MCV RBC AUTO: 95 FL (ref 82–98)
MONOCYTES # BLD AUTO: 0.5 K/UL (ref 0.3–1)
MONOCYTES NFR BLD: 14 % (ref 4–15)
NEUTROPHILS # BLD AUTO: 2.4 K/UL (ref 1.8–7.7)
NEUTROPHILS NFR BLD: 67.7 % (ref 38–73)
NRBC BLD-RTO: 0 /100 WBC
PLATELET # BLD AUTO: 236 K/UL (ref 150–450)
PMV BLD AUTO: 10.9 FL (ref 9.2–12.9)
RBC # BLD AUTO: 4.55 M/UL (ref 4–5.4)
WBC # BLD AUTO: 3.49 K/UL (ref 3.9–12.7)

## 2023-07-06 PROCEDURE — 85025 COMPLETE CBC W/AUTO DIFF WBC: CPT | Performed by: INTERNAL MEDICINE

## 2023-07-06 PROCEDURE — 36415 COLL VENOUS BLD VENIPUNCTURE: CPT | Mod: PN | Performed by: INTERNAL MEDICINE

## 2023-07-06 NOTE — TELEPHONE ENCOUNTER
Received CBC done 7/6/23  WBC 3900(4000) ANC  2800  rest nl    Recheck standing lupus labs in 3 months.

## 2023-09-13 DIAGNOSIS — M32.19 OTHER SYSTEMIC LUPUS ERYTHEMATOSUS WITH OTHER ORGAN INVOLVEMENT: ICD-10-CM

## 2023-09-14 RX ORDER — AZATHIOPRINE 50 MG/1
50 TABLET ORAL 2 TIMES DAILY
Qty: 60 TABLET | Refills: 1 | Status: SHIPPED | OUTPATIENT
Start: 2023-09-14 | End: 2023-09-30

## 2023-09-15 ENCOUNTER — PATIENT MESSAGE (OUTPATIENT)
Dept: RHEUMATOLOGY | Facility: CLINIC | Age: 58
End: 2023-09-15
Payer: COMMERCIAL

## 2023-09-18 DIAGNOSIS — D24.1 INTRADUCTAL PAPILLOMA OF RIGHT BREAST: Primary | ICD-10-CM

## 2023-09-18 NOTE — PROGRESS NOTES
[facility]  MD Delphine Lee, NP-C    Date of Service: 9/25/2023      Chief Complaint:   Madeleine Nunez is a 58 y.o. female presenting today for  6 month follow up. She is due for Mammogram  She reports no interval changes on her self-breast examination.     History of Present Illness:   Mrs. Nunez first presented on November 29, 2022 with a bloody right nipple discharge (spontaneous) that occurred multiple times. Her last mammogram was normal. Ultrsound showed an intraductal mass and sonocore biopsy was consistent with an intraductal papilloma. Excision showed benign intraductal papillomas with benign changes. NEM. MD::: Sea Garnett MD.    Past Medical History:   Diagnosis Date    Anemia     Blood transfusion     Bloody discharge from right nipple 02/25/2023    Dry eyes     Family history of malignant neoplasm of breast 02/25/2023    Hypothyroidism 10/09/2012    Intraductal papilloma of right breast 02/25/2023    She understands that anytime a papilloma is found at the time of core biopsy - that an excisional biopsy is needed to rule out an adjacent cancer. All R/B/I were discussed with the patient, and she wishes to proceed. PLAN:::: RIGHT BREAST BIOPSY AFTER NEEDLE LOCALIZATION.    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    Osteoarthritis of left knee 04/25/2018    Substance abuse     Systemic lupus erythematosus       Past Surgical History:   Procedure Laterality Date    BLADDER SUSPENSION      BREAST BIOPSY Right 11/2022    CHOLECYSTECTOMY      HYSTEROSCOPY WITH DILATION AND CURETTAGE OF UTERUS  07/27/2022    INNER EAR SURGERY      shunt    RALH BSO  03/07/2023    Right breast Excisonal biopsy after needle localization  03/07/2023        Current Outpatient Medications:     azaTHIOprine (IMURAN) 50 mg Tab, TAKE ONE TABLET BY MOUTH  TWICE DAILY, Disp: 60 tablet, Rfl: 1    BENLYSTA 200 mg/mL AtIn, INJECT 1 PEN UNDER THE SKIN EVERY 7 DAYS., Disp: 4 each, Rfl: 5    betahistine HCl (BETAHISTINE, BULK, MISC), 14 mg by Misc.(Non-Drug; Combo Route) route 3 (three) times daily. , Disp: , Rfl:     estradioL (ESTRACE) 2 MG tablet, Take 1 tablet (2 mg total) by mouth once daily., Disp: 30 tablet, Rfl: 6    HYDROcodone-acetaminophen (NORCO) 7.5-325 mg per tablet, Take 1 tablet by mouth every 6 (six) hours as needed for Pain., Disp: 10 tablet, Rfl: 0    sertraline (ZOLOFT) 50 MG tablet, , Disp: , Rfl:     spironolactone (ALDACTONE) 50 MG tablet, TK 1 T PO QD, Disp: , Rfl: 5    levothyroxine (SYNTHROID) 100 MCG tablet, Take 1 tablet by mouth once daily., Disp: , Rfl:     progesterone (PROMETRIUM) 200 MG capsule, TAKE 1 CAPSULE BY MOUTH EVERY EVENING FROM 7-9PM, Disp: , Rfl: 6   Review of patient's allergies indicates:   Allergen Reactions    Sulfa (sulfonamide antibiotics)      Other reaction(s): Flushing (skin)      Social History     Tobacco Use    Smoking status: Never    Smokeless tobacco: Never   Substance Use Topics    Alcohol use: Yes     Comment: 1 drink per week      Family History   Problem Relation Age of Onset    Lupus Sister     Thyroid cancer Sister     Glaucoma Sister     Melanoma Father     Thyroid cancer Father     Diabetes Father     Cataracts Father     Diabetes Mother     Glaucoma Mother     Cataracts Mother     Hypertension Brother     Kidney failure Sister     Lupus Sister     Glaucoma Sister     Breast cancer Paternal Grandmother     Kidney disease Neg Hx     Psoriasis Neg Hx     Eczema Neg Hx     Acne Neg Hx     Colon cancer Neg Hx     Ovarian cancer Neg Hx     Thrombophilia Neg Hx     Thrombosis Neg Hx         Review of Systems   Integumentary:  Negative for color change, rash, mole/lesion, breast mass, breast discharge and breast tenderness.   Breast: Negative for mass and tenderness       Physical Exam   HENT:   Head:  Normocephalic.   Pulmonary/Chest: Right breast exhibits no inverted nipple, no mass, no nipple discharge, no skin change and no tenderness. Left breast exhibits no inverted nipple, no mass, no nipple discharge, no skin change and no tenderness. No breast swelling.   Genitourinary: No breast swelling.   Musculoskeletal: Lymphadenopathy:      Upper Body:      Right upper body: No supraclavicular or axillary adenopathy.      Left upper body: No supraclavicular or axillary adenopathy.     Neurological: She is alert.      MAMMOGRAM REPORT: She has some diffuse fibronodular tissue; there are no spiculated lesions, distortions or suspicious calcifications noted; NEM    NOTE:::We viewed her films together at today's visit.  We discussed the multiple views obtained and the important findings.  Even benign changes were mentioned and her questions were answered.  She knows that she may receive a formal letter or report from the Radiologist.  She is to contact us if she has questions.      Assessment/Plan  1. Intraductal papilloma of right breast  Assessment & Plan:  We reviewed our findings today and her questions were answered.  She understands that her imaging and exams have remained stable (and show nothing concerning).  She is comfortable being followed in a conservative fashion.      She understands the importance of monthly self-breast examination and knows to report any and all changes as they occur.        2. Bilateral mastodynia  Assessment & Plan:  We discussed our fibrocystic mastopathy protocol in detail. She knows that if she follows this protocol - that her symptoms should improve.  We discussed how breast pain is usually not associated with breast cancer, however, pain can be the presenting symptom with some cancers (but this could be coincidental). Still, if her pain does not improve in 8-12 weeks she should call us back for additional recommendations.               Medical Decision Making:  It is my impression  that this patient suffers all conditions contained in this medical document.  Each of these conditions did affect our plan of care and my medical decision making today.  It is my opinion that the medical decision making concerning this patient was of moderate difficulty based on the aforementioned conditions.  Any further recommendations will be communicated to the patient by me.  I have reviewed and verified her allergies, list of medications, medical and surgical histories, social history, and a pertinent review of symptoms.      Follow up:  1 year and prn    For:  MICHELLE (ARTHUR) at [facility]

## 2023-09-25 ENCOUNTER — OFFICE VISIT (OUTPATIENT)
Dept: SURGERY | Facility: CLINIC | Age: 58
End: 2023-09-25
Payer: COMMERCIAL

## 2023-09-25 DIAGNOSIS — D24.1 INTRADUCTAL PAPILLOMA OF RIGHT BREAST: Primary | ICD-10-CM

## 2023-09-25 DIAGNOSIS — N64.4 BILATERAL MASTODYNIA: ICD-10-CM

## 2023-09-25 PROCEDURE — 1160F PR REVIEW ALL MEDS BY PRESCRIBER/CLIN PHARMACIST DOCUMENTED: ICD-10-PCS | Mod: CPTII,S$GLB,, | Performed by: NURSE PRACTITIONER

## 2023-09-25 PROCEDURE — 1159F MED LIST DOCD IN RCRD: CPT | Mod: CPTII,S$GLB,, | Performed by: NURSE PRACTITIONER

## 2023-09-25 PROCEDURE — 99213 PR OFFICE/OUTPT VISIT, EST, LEVL III, 20-29 MIN: ICD-10-PCS | Mod: S$GLB,,, | Performed by: NURSE PRACTITIONER

## 2023-09-25 PROCEDURE — 1159F PR MEDICATION LIST DOCUMENTED IN MEDICAL RECORD: ICD-10-PCS | Mod: CPTII,S$GLB,, | Performed by: NURSE PRACTITIONER

## 2023-09-25 PROCEDURE — 99213 OFFICE O/P EST LOW 20 MIN: CPT | Mod: S$GLB,,, | Performed by: NURSE PRACTITIONER

## 2023-09-25 PROCEDURE — 1160F RVW MEDS BY RX/DR IN RCRD: CPT | Mod: CPTII,S$GLB,, | Performed by: NURSE PRACTITIONER

## 2023-09-27 DIAGNOSIS — R00.2 PALPITATIONS: Primary | ICD-10-CM

## 2023-09-28 ENCOUNTER — PATIENT MESSAGE (OUTPATIENT)
Dept: CARDIOLOGY | Facility: CLINIC | Age: 58
End: 2023-09-28
Payer: COMMERCIAL

## 2023-09-29 ENCOUNTER — LAB VISIT (OUTPATIENT)
Dept: LAB | Facility: HOSPITAL | Age: 58
End: 2023-09-29
Attending: INTERNAL MEDICINE
Payer: COMMERCIAL

## 2023-09-29 DIAGNOSIS — M32.9 SYSTEMIC LUPUS ERYTHEMATOSUS, UNSPECIFIED SLE TYPE, UNSPECIFIED ORGAN INVOLVEMENT STATUS: ICD-10-CM

## 2023-09-29 LAB
ALBUMIN SERPL BCP-MCNC: 3.6 G/DL (ref 3.5–5.2)
ALP SERPL-CCNC: 67 U/L (ref 55–135)
ALT SERPL W/O P-5'-P-CCNC: 9 U/L (ref 10–44)
ANION GAP SERPL CALC-SCNC: 4 MMOL/L (ref 8–16)
AST SERPL-CCNC: 16 U/L (ref 10–40)
BASOPHILS # BLD AUTO: 0.03 K/UL (ref 0–0.2)
BASOPHILS NFR BLD: 1 % (ref 0–1.9)
BILIRUB SERPL-MCNC: 0.4 MG/DL (ref 0.1–1)
BUN SERPL-MCNC: 14 MG/DL (ref 6–20)
C3 SERPL-MCNC: 95 MG/DL (ref 50–180)
C4 SERPL-MCNC: 15 MG/DL (ref 11–44)
CALCIUM SERPL-MCNC: 8.7 MG/DL (ref 8.7–10.5)
CHLORIDE SERPL-SCNC: 104 MMOL/L (ref 95–110)
CO2 SERPL-SCNC: 28 MMOL/L (ref 23–29)
CREAT SERPL-MCNC: 0.7 MG/DL (ref 0.5–1.4)
CRP SERPL-MCNC: 0.4 MG/L (ref 0–8.2)
DIFFERENTIAL METHOD: ABNORMAL
EOSINOPHIL # BLD AUTO: 0.1 K/UL (ref 0–0.5)
EOSINOPHIL NFR BLD: 2.6 % (ref 0–8)
ERYTHROCYTE [DISTWIDTH] IN BLOOD BY AUTOMATED COUNT: 12.1 % (ref 11.5–14.5)
ERYTHROCYTE [SEDIMENTATION RATE] IN BLOOD BY PHOTOMETRIC METHOD: 6 MM/HR (ref 0–36)
EST. GFR  (NO RACE VARIABLE): >60 ML/MIN/1.73 M^2
GLUCOSE SERPL-MCNC: 88 MG/DL (ref 70–110)
HCT VFR BLD AUTO: 41.3 % (ref 37–48.5)
HGB BLD-MCNC: 13.9 G/DL (ref 12–16)
IMM GRANULOCYTES # BLD AUTO: 0.01 K/UL (ref 0–0.04)
IMM GRANULOCYTES NFR BLD AUTO: 0.3 % (ref 0–0.5)
LYMPHOCYTES # BLD AUTO: 0.6 K/UL (ref 1–4.8)
LYMPHOCYTES NFR BLD: 19.4 % (ref 18–48)
MCH RBC QN AUTO: 32.5 PG (ref 27–31)
MCHC RBC AUTO-ENTMCNC: 33.7 G/DL (ref 32–36)
MCV RBC AUTO: 97 FL (ref 82–98)
MONOCYTES # BLD AUTO: 0.5 K/UL (ref 0.3–1)
MONOCYTES NFR BLD: 16.8 % (ref 4–15)
NEUTROPHILS # BLD AUTO: 1.8 K/UL (ref 1.8–7.7)
NEUTROPHILS NFR BLD: 59.9 % (ref 38–73)
NRBC BLD-RTO: 0 /100 WBC
PLATELET # BLD AUTO: 226 K/UL (ref 150–450)
PMV BLD AUTO: 11.1 FL (ref 9.2–12.9)
POTASSIUM SERPL-SCNC: 3.9 MMOL/L (ref 3.5–5.1)
PROT SERPL-MCNC: 6.4 G/DL (ref 6–8.4)
RBC # BLD AUTO: 4.28 M/UL (ref 4–5.4)
SODIUM SERPL-SCNC: 136 MMOL/L (ref 136–145)
WBC # BLD AUTO: 3.04 K/UL (ref 3.9–12.7)

## 2023-09-29 PROCEDURE — 85652 RBC SED RATE AUTOMATED: CPT | Performed by: INTERNAL MEDICINE

## 2023-09-29 PROCEDURE — 80053 COMPREHEN METABOLIC PANEL: CPT | Performed by: INTERNAL MEDICINE

## 2023-09-29 PROCEDURE — 36415 COLL VENOUS BLD VENIPUNCTURE: CPT | Mod: PN | Performed by: INTERNAL MEDICINE

## 2023-09-29 PROCEDURE — 86160 COMPLEMENT ANTIGEN: CPT | Performed by: INTERNAL MEDICINE

## 2023-09-29 PROCEDURE — 86160 COMPLEMENT ANTIGEN: CPT | Mod: 59 | Performed by: INTERNAL MEDICINE

## 2023-09-29 PROCEDURE — 85025 COMPLETE CBC W/AUTO DIFF WBC: CPT | Performed by: INTERNAL MEDICINE

## 2023-09-29 PROCEDURE — 86225 DNA ANTIBODY NATIVE: CPT | Performed by: INTERNAL MEDICINE

## 2023-09-29 PROCEDURE — 86140 C-REACTIVE PROTEIN: CPT | Performed by: INTERNAL MEDICINE

## 2023-09-30 ENCOUNTER — TELEPHONE (OUTPATIENT)
Dept: RHEUMATOLOGY | Facility: CLINIC | Age: 58
End: 2023-09-30
Payer: COMMERCIAL

## 2023-09-30 DIAGNOSIS — D72.819 LEUKOPENIA, UNSPECIFIED TYPE: Primary | ICD-10-CM

## 2023-09-30 DIAGNOSIS — M32.19 OTHER SYSTEMIC LUPUS ERYTHEMATOSUS WITH OTHER ORGAN INVOLVEMENT: ICD-10-CM

## 2023-09-30 RX ORDER — AZATHIOPRINE 50 MG/1
TABLET ORAL
Qty: 135 TABLET | Refills: 0 | Status: SHIPPED | OUTPATIENT
Start: 2023-09-30 | End: 2023-10-30 | Stop reason: SDUPTHER

## 2023-09-30 NOTE — TELEPHONE ENCOUNTER
Please ask pt to decrease azathioprine to 75mg daily(1.5 50mg tabs) Please schedule cbc in 2 wks. Thank you MINA

## 2023-10-02 ENCOUNTER — OFFICE VISIT (OUTPATIENT)
Dept: CARDIOLOGY | Facility: CLINIC | Age: 58
End: 2023-10-02
Payer: COMMERCIAL

## 2023-10-02 ENCOUNTER — HOSPITAL ENCOUNTER (OUTPATIENT)
Dept: CARDIOLOGY | Facility: HOSPITAL | Age: 58
Discharge: HOME | End: 2023-10-02
Attending: INTERNAL MEDICINE
Payer: COMMERCIAL

## 2023-10-02 VITALS
BODY MASS INDEX: 22.28 KG/M2 | OXYGEN SATURATION: 98 % | HEART RATE: 63 BPM | SYSTOLIC BLOOD PRESSURE: 98 MMHG | DIASTOLIC BLOOD PRESSURE: 70 MMHG | WEIGHT: 138 LBS

## 2023-10-02 DIAGNOSIS — I49.3 PVC (PREMATURE VENTRICULAR CONTRACTION): Primary | ICD-10-CM

## 2023-10-02 DIAGNOSIS — R00.2 PALPITATIONS: ICD-10-CM

## 2023-10-02 DIAGNOSIS — M32.19 OTHER SYSTEMIC LUPUS ERYTHEMATOSUS WITH OTHER ORGAN INVOLVEMENT: ICD-10-CM

## 2023-10-02 DIAGNOSIS — M35.00 SICCA SYNDROME: ICD-10-CM

## 2023-10-02 LAB — DSDNA AB SER-ACNC: NORMAL [IU]/ML

## 2023-10-02 PROCEDURE — 3008F PR BODY MASS INDEX (BMI) DOCUMENTED: ICD-10-PCS | Mod: CPTII,S$GLB,, | Performed by: INTERNAL MEDICINE

## 2023-10-02 PROCEDURE — 3074F SYST BP LT 130 MM HG: CPT | Mod: CPTII,S$GLB,, | Performed by: INTERNAL MEDICINE

## 2023-10-02 PROCEDURE — 99999 PR PBB SHADOW E&M-EST. PATIENT-LVL III: CPT | Mod: PBBFAC,,, | Performed by: INTERNAL MEDICINE

## 2023-10-02 PROCEDURE — 99205 OFFICE O/P NEW HI 60 MIN: CPT | Mod: 25,S$GLB,, | Performed by: INTERNAL MEDICINE

## 2023-10-02 PROCEDURE — 93005 ELECTROCARDIOGRAM TRACING: CPT

## 2023-10-02 PROCEDURE — 93010 ELECTROCARDIOGRAM REPORT: CPT | Mod: ,,, | Performed by: INTERNAL MEDICINE

## 2023-10-02 PROCEDURE — 99999 PR PBB SHADOW E&M-EST. PATIENT-LVL III: ICD-10-PCS | Mod: PBBFAC,,, | Performed by: INTERNAL MEDICINE

## 2023-10-02 PROCEDURE — 1160F PR REVIEW ALL MEDS BY PRESCRIBER/CLIN PHARMACIST DOCUMENTED: ICD-10-PCS | Mod: CPTII,S$GLB,, | Performed by: INTERNAL MEDICINE

## 2023-10-02 PROCEDURE — 3078F DIAST BP <80 MM HG: CPT | Mod: CPTII,S$GLB,, | Performed by: INTERNAL MEDICINE

## 2023-10-02 PROCEDURE — 93010 EKG 12-LEAD: ICD-10-PCS | Mod: ,,, | Performed by: INTERNAL MEDICINE

## 2023-10-02 PROCEDURE — 3078F PR MOST RECENT DIASTOLIC BLOOD PRESSURE < 80 MM HG: ICD-10-PCS | Mod: CPTII,S$GLB,, | Performed by: INTERNAL MEDICINE

## 2023-10-02 PROCEDURE — 1159F MED LIST DOCD IN RCRD: CPT | Mod: CPTII,S$GLB,, | Performed by: INTERNAL MEDICINE

## 2023-10-02 PROCEDURE — 99205 PR OFFICE/OUTPT VISIT, NEW, LEVL V, 60-74 MIN: ICD-10-PCS | Mod: 25,S$GLB,, | Performed by: INTERNAL MEDICINE

## 2023-10-02 PROCEDURE — 1159F PR MEDICATION LIST DOCUMENTED IN MEDICAL RECORD: ICD-10-PCS | Mod: CPTII,S$GLB,, | Performed by: INTERNAL MEDICINE

## 2023-10-02 PROCEDURE — 1160F RVW MEDS BY RX/DR IN RCRD: CPT | Mod: CPTII,S$GLB,, | Performed by: INTERNAL MEDICINE

## 2023-10-02 PROCEDURE — 3074F PR MOST RECENT SYSTOLIC BLOOD PRESSURE < 130 MM HG: ICD-10-PCS | Mod: CPTII,S$GLB,, | Performed by: INTERNAL MEDICINE

## 2023-10-02 PROCEDURE — 3008F BODY MASS INDEX DOCD: CPT | Mod: CPTII,S$GLB,, | Performed by: INTERNAL MEDICINE

## 2023-10-02 NOTE — PROGRESS NOTES
"  Subjective:   Patient ID:  Madeleine Nunez is a 58 y.o. female     Chief complaint:PVCs    HPI  New patient to me. (10/02/2023 )  Referred  by Dr ANTONIA Bender for evaluation and management of PVCs   --   Background as gleaned from patient's records and today's interview :  Was seen 7 years ago by Dr. Sifuentes for similar complaint.  Had echo and Holter. Normal echo and Holter with 7% MM PVC burden- No sx   Patient was not treated.  She has SLE.  She is very active.  She spends an hour every day on just size and weight is plus she walks in the afternoons.  This year she had 2 surgeries (hysterectomy and inner ear shunt) during which PVCs were noted.  Patient had been asymptomatic that time.  She saw Dr. Pennington who started her on metoprolol.  She did not tolerate it.  She started having dizziness and nausea.  She stopped it.  She asked Dr. Bender for a referral and he sent her to me.  Patient is now anxious about the PVCs because she was told eventually her heart will fail because  of "too many heartbeats".  I reviewed all of her ECGs in epic.  One of them has PVCs.  They are narrow, late diastolic and with a late right bundle-branch block type aberration.  PVCs are not seen in V1 V2 V3 but seen in the other 9 leads.  I have also reviewed the actual image of the ECG tracing obtained today and it shows NSR with normal intervals.      Current Outpatient Medications   Medication Sig    azaTHIOprine (IMURAN) 50 mg Tab 75mg(1.5 tabs) daily    BENLYSTA 200 mg/mL AtIn INJECT 1 PEN UNDER THE SKIN EVERY 7 DAYS.    betahistine HCl (BETAHISTINE, BULK, MISC) 14 mg by Misc.(Non-Drug; Combo Route) route 3 (three) times daily.     estradioL (ESTRACE) 2 MG tablet Take 1 tablet (2 mg total) by mouth once daily.    levothyroxine (SYNTHROID) 100 MCG tablet Take 1 tablet by mouth once daily.    sertraline (ZOLOFT) 50 MG tablet     spironolactone (ALDACTONE) 50 MG tablet TK 1 T PO QD    HYDROcodone-acetaminophen (NORCO) 7.5-325 mg per tablet " Take 1 tablet by mouth every 6 (six) hours as needed for Pain.     No current facility-administered medications for this visit.       Review of Systems     Constitutional: Reviewed  for decreased appetite, weight gain and weight loss.   HENT: Reviewed for nosebleeds.    Eyes:  Reviewed for blurred vision and visual disturbance.   Cardiovascular: Reviewed for chest pain, claudication, cyanosis,dyspnea on exertion, leg swelling, orthopnea,paroxysmal nocturnal dyspnearregular heartbeats, palpitations, near-syncope, and syncope.   Respiratory: Reviewed for cough, shortness of breath, wheezing, sleep disturbances due to breathing and snoring, .    Endocrine: Reviewed for heat intolerance.   Hematologic/Lymphatic: Reviewed for easy bruisability/bleeding.   Skin: Reviewed for rash.   Musculoskeletal: Reviewed for muscle weakness and myalgias.   Gastrointestinal: Reviewed for abdominal pain, anorexia, melena, nausea and vomiting.   Genitourinary: Reviewed for menorrhagia, frequency, nocturia and incontinence.   Neurological: Reviewed for excessive daytime sleepiness, dizziness, vertigo, weakness, headaches, loss of balance and seizures,   Psychiatric/Behavioral:  Reviewed for insomnia, altered mental status, depression, anxiety and nervousness.       All symptoms reviewed above were negative except for arthritic complaints     Social History     Tobacco Use   Smoking Status Never   Smokeless Tobacco Never       reports current alcohol use.   Past Medical History:   Diagnosis Date    Anemia     Blood transfusion     Bloody discharge from right nipple 02/25/2023    Dry eyes     Family history of malignant neoplasm of breast 02/25/2023    Hypothyroidism 10/09/2012    Intraductal papilloma of right breast 02/25/2023    She understands that anytime a papilloma is found at the time of core biopsy - that an excisional biopsy is needed to rule out an adjacent cancer. All R/B/I were discussed with the patient, and she wishes to  proceed. PLAN:::: RIGHT BREAST BIOPSY AFTER NEEDLE LOCALIZATION.    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    Osteoarthritis of left knee 04/25/2018    Substance abuse     Systemic lupus erythematosus      Family History   Problem Relation Age of Onset    Lupus Sister     Thyroid cancer Sister     Glaucoma Sister     Melanoma Father     Thyroid cancer Father     Diabetes Father     Cataracts Father     Diabetes Mother     Glaucoma Mother     Cataracts Mother     Hypertension Brother     Kidney failure Sister     Lupus Sister     Glaucoma Sister     Breast cancer Paternal Grandmother     Kidney disease Neg Hx     Psoriasis Neg Hx     Eczema Neg Hx     Acne Neg Hx     Colon cancer Neg Hx     Ovarian cancer Neg Hx     Thrombophilia Neg Hx     Thrombosis Neg Hx      Social History     Socioeconomic History    Marital status:    Occupational History     Employer: [employer]   Tobacco Use    Smoking status: Never    Smokeless tobacco: Never   Substance and Sexual Activity    Alcohol use: Yes     Comment: 1 drink per week    Drug use: No    Sexual activity: Yes     Partners: Male     Birth control/protection: Post-menopausal   Other Topics Concern    Are you pregnant or think you may be? No    Breast-feeding No     Past Surgical History:   Procedure Laterality Date    BLADDER SUSPENSION      BREAST BIOPSY Right 11/2022    CHOLECYSTECTOMY      HYSTEROSCOPY WITH DILATION AND CURETTAGE OF UTERUS  07/27/2022    INNER EAR SURGERY      shunt    RALH BSO  03/07/2023    Right breast Excisonal biopsy after needle localization  03/07/2023    ROBOT-ASSISTED LAPAROSCOPIC HYSTERECTOMY Bilateral 04/2023       Objective:   Physical Exam  Vitals and nursing note reviewed.   Constitutional:       Appearance: She is well-developed.   HENT:      Head: Normocephalic and atraumatic.      Right Ear: External ear normal.       Left Ear: External ear normal.   Neck:      Thyroid: No thyromegaly.   Cardiovascular:      Rate and Rhythm: Normal rate and regular rhythm.      Pulses: Intact distal pulses.           Carotid pulses are 2+ on the right side and 2+ on the left side.       Radial pulses are 2+ on the right side and 2+ on the left side.        Dorsalis pedis pulses are 2+ on the right side and 2+ on the left side.        Posterior tibial pulses are 2+ on the right side and 2+ on the left side.      Heart sounds: Normal heart sounds. No midsystolic click and no opening snap. No murmur heard.     No friction rub. No gallop. No S3 or S4 sounds.   Pulmonary:      Effort: Pulmonary effort is normal.      Breath sounds: Normal breath sounds.   Abdominal:      General: There is no distension.      Palpations: Abdomen is soft.      Tenderness: There is no abdominal tenderness.   Musculoskeletal:      Cervical back: Normal range of motion and neck supple.      Right lower leg: No swelling.      Left lower leg: No swelling.      Right ankle: No swelling.      Left ankle: No swelling.   Skin:     General: Skin is warm.      Findings: No rash.      Nails: There is no clubbing.   Neurological:      Mental Status: She is alert and oriented to person, place, and time.      Cranial Nerves: No cranial nerve deficit.      Gait: Gait normal.   Psychiatric:         Speech: Speech normal.         Behavior: Behavior normal.         Thought Content: Thought content normal.     BP 98/70   Pulse 63   Wt 62.6 kg (138 lb 0.1 oz)   LMP  (LMP Unknown)   SpO2 98%   BMI 22.28 kg/m²          No results found for this or any previous visit.    WBC   Date Value Ref Range Status   09/29/2023 3.04 (L) 3.90 - 12.70 K/uL Final     Hematocrit   Date Value Ref Range Status   09/29/2023 41.3 37.0 - 48.5 % Final     Hemoglobin   Date Value Ref Range Status   09/29/2023 13.9 12.0 - 16.0 g/dL Final     Lab Results   Component Value Date     09/29/2023     Lab  "Results   Component Value Date    CREATININE 0.7 09/29/2023    EGFRNORACEVR >60.0 09/29/2023    K 3.9 09/29/2023     No results found for: "BNP"      Assessment:    Benign narrow complex PVCs.  They are asymptomatic and there characteristics are such that they are not likely to ever cause significant dyssynchrony/PVC related cardiomyopathy.  Patient is very healthy from a CV point of view.  1. PVC (premature ventricular contraction)    2. Other systemic lupus erythematosus with other organ involvement    3. Sicca syndrome        Plan:    Reassurance  There is no need for even further diagnostics.  RTC p.r.n.  No orders of the defined types were placed in this encounter.    Follow up if symptoms worsen or fail to improve.  There are no discontinued medications.  Outpatient Encounter Medications as of 10/2/2023   Medication Sig Dispense Refill    azaTHIOprine (IMURAN) 50 mg Tab 75mg(1.5 tabs) daily 135 tablet 0    BENLYSTA 200 mg/mL AtIn INJECT 1 PEN UNDER THE SKIN EVERY 7 DAYS. 4 each 5    betahistine HCl (BETAHISTINE, BULK, MISC) 14 mg by Misc.(Non-Drug; Combo Route) route 3 (three) times daily.       estradioL (ESTRACE) 2 MG tablet Take 1 tablet (2 mg total) by mouth once daily. 30 tablet 6    levothyroxine (SYNTHROID) 100 MCG tablet Take 1 tablet by mouth once daily.      sertraline (ZOLOFT) 50 MG tablet       spironolactone (ALDACTONE) 50 MG tablet TK 1 T PO QD  5    HYDROcodone-acetaminophen (NORCO) 7.5-325 mg per tablet Take 1 tablet by mouth every 6 (six) hours as needed for Pain. 10 tablet 0    [DISCONTINUED] azaTHIOprine (IMURAN) 50 mg Tab TAKE ONE TABLET BY MOUTH TWICE DAILY 60 tablet 1     No facility-administered encounter medications on file as of 10/2/2023.     Medication List with Changes/Refills   Current Medications    AZATHIOPRINE (IMURAN) 50 MG TAB    75mg(1.5 tabs) daily    BENLYSTA 200 MG/ML ATIN    INJECT 1 PEN UNDER THE SKIN EVERY 7 DAYS.    BETAHISTINE HCL (BETAHISTINE, BULK, MISC)    14 mg by " Misc.(Non-Drug; Combo Route) route 3 (three) times daily.     ESTRADIOL (ESTRACE) 2 MG TABLET    Take 1 tablet (2 mg total) by mouth once daily.    HYDROCODONE-ACETAMINOPHEN (NORCO) 7.5-325 MG PER TABLET    Take 1 tablet by mouth every 6 (six) hours as needed for Pain.    LEVOTHYROXINE (SYNTHROID) 100 MCG TABLET    Take 1 tablet by mouth once daily.    SERTRALINE (ZOLOFT) 50 MG TABLET        SPIRONOLACTONE (ALDACTONE) 50 MG TABLET    TK 1 T PO QD        This note is at least partially dictated using the M*Modal Fluency Direct word recognition program. There are word recognition mistakes that are occasionally missed on review.

## 2023-10-06 ENCOUNTER — PATIENT MESSAGE (OUTPATIENT)
Dept: RHEUMATOLOGY | Facility: CLINIC | Age: 58
End: 2023-10-06
Payer: COMMERCIAL

## 2023-10-06 ENCOUNTER — TELEPHONE (OUTPATIENT)
Dept: RHEUMATOLOGY | Facility: CLINIC | Age: 58
End: 2023-10-06
Payer: COMMERCIAL

## 2023-10-12 ENCOUNTER — LAB VISIT (OUTPATIENT)
Dept: LAB | Facility: HOSPITAL | Age: 58
End: 2023-10-12
Attending: INTERNAL MEDICINE
Payer: COMMERCIAL

## 2023-10-12 DIAGNOSIS — D72.819 LEUKOPENIA, UNSPECIFIED TYPE: ICD-10-CM

## 2023-10-12 PROCEDURE — 85025 COMPLETE CBC W/AUTO DIFF WBC: CPT | Performed by: INTERNAL MEDICINE

## 2023-10-12 PROCEDURE — 36415 COLL VENOUS BLD VENIPUNCTURE: CPT | Mod: PN | Performed by: INTERNAL MEDICINE

## 2023-10-13 LAB
BASOPHILS # BLD AUTO: 0.04 K/UL (ref 0–0.2)
BASOPHILS NFR BLD: 1.1 % (ref 0–1.9)
DIFFERENTIAL METHOD: ABNORMAL
EOSINOPHIL # BLD AUTO: 0.1 K/UL (ref 0–0.5)
EOSINOPHIL NFR BLD: 1.6 % (ref 0–8)
ERYTHROCYTE [DISTWIDTH] IN BLOOD BY AUTOMATED COUNT: 12.2 % (ref 11.5–14.5)
HCT VFR BLD AUTO: 42.8 % (ref 37–48.5)
HGB BLD-MCNC: 14.1 G/DL (ref 12–16)
IMM GRANULOCYTES # BLD AUTO: 0.01 K/UL (ref 0–0.04)
IMM GRANULOCYTES NFR BLD AUTO: 0.3 % (ref 0–0.5)
LYMPHOCYTES # BLD AUTO: 0.7 K/UL (ref 1–4.8)
LYMPHOCYTES NFR BLD: 18 % (ref 18–48)
MCH RBC QN AUTO: 32.4 PG (ref 27–31)
MCHC RBC AUTO-ENTMCNC: 32.9 G/DL (ref 32–36)
MCV RBC AUTO: 98 FL (ref 82–98)
MONOCYTES # BLD AUTO: 0.4 K/UL (ref 0.3–1)
MONOCYTES NFR BLD: 11.4 % (ref 4–15)
NEUTROPHILS # BLD AUTO: 2.6 K/UL (ref 1.8–7.7)
NEUTROPHILS NFR BLD: 67.6 % (ref 38–73)
NRBC BLD-RTO: 0 /100 WBC
PLATELET # BLD AUTO: 239 K/UL (ref 150–450)
PMV BLD AUTO: 10.9 FL (ref 9.2–12.9)
RBC # BLD AUTO: 4.35 M/UL (ref 4–5.4)
WBC # BLD AUTO: 3.78 K/UL (ref 3.9–12.7)

## 2023-10-30 ENCOUNTER — HOSPITAL ENCOUNTER (OUTPATIENT)
Dept: RADIOLOGY | Facility: CLINIC | Age: 58
Discharge: HOME | End: 2023-10-30
Attending: INTERNAL MEDICINE
Payer: COMMERCIAL

## 2023-10-30 ENCOUNTER — OFFICE VISIT (OUTPATIENT)
Dept: RHEUMATOLOGY | Facility: CLINIC | Age: 58
End: 2023-10-30
Payer: COMMERCIAL

## 2023-10-30 VITALS
WEIGHT: 134 LBS | HEART RATE: 72 BPM | HEIGHT: 64 IN | BODY MASS INDEX: 22.88 KG/M2 | SYSTOLIC BLOOD PRESSURE: 111 MMHG | DIASTOLIC BLOOD PRESSURE: 74 MMHG

## 2023-10-30 DIAGNOSIS — Z78.0 MENOPAUSE: ICD-10-CM

## 2023-10-30 DIAGNOSIS — M32.9 SYSTEMIC LUPUS ERYTHEMATOSUS, UNSPECIFIED SLE TYPE, UNSPECIFIED ORGAN INVOLVEMENT STATUS: ICD-10-CM

## 2023-10-30 DIAGNOSIS — M32.19 OTHER SYSTEMIC LUPUS ERYTHEMATOSUS WITH OTHER ORGAN INVOLVEMENT: ICD-10-CM

## 2023-10-30 PROCEDURE — 99214 OFFICE O/P EST MOD 30 MIN: CPT | Mod: S$GLB,,, | Performed by: INTERNAL MEDICINE

## 2023-10-30 PROCEDURE — 77080 DXA BONE DENSITY AXIAL: CPT | Mod: TC

## 2023-10-30 PROCEDURE — 3008F BODY MASS INDEX DOCD: CPT | Mod: CPTII,S$GLB,, | Performed by: INTERNAL MEDICINE

## 2023-10-30 PROCEDURE — 3078F DIAST BP <80 MM HG: CPT | Mod: CPTII,S$GLB,, | Performed by: INTERNAL MEDICINE

## 2023-10-30 PROCEDURE — 1159F PR MEDICATION LIST DOCUMENTED IN MEDICAL RECORD: ICD-10-PCS | Mod: CPTII,S$GLB,, | Performed by: INTERNAL MEDICINE

## 2023-10-30 PROCEDURE — 3074F SYST BP LT 130 MM HG: CPT | Mod: CPTII,S$GLB,, | Performed by: INTERNAL MEDICINE

## 2023-10-30 PROCEDURE — 1159F MED LIST DOCD IN RCRD: CPT | Mod: CPTII,S$GLB,, | Performed by: INTERNAL MEDICINE

## 2023-10-30 PROCEDURE — 99214 PR OFFICE/OUTPT VISIT, EST, LEVL IV, 30-39 MIN: ICD-10-PCS | Mod: S$GLB,,, | Performed by: INTERNAL MEDICINE

## 2023-10-30 PROCEDURE — 77080 DXA BONE DENSITY AXIAL: CPT | Mod: 26,,, | Performed by: INTERNAL MEDICINE

## 2023-10-30 PROCEDURE — 3074F PR MOST RECENT SYSTOLIC BLOOD PRESSURE < 130 MM HG: ICD-10-PCS | Mod: CPTII,S$GLB,, | Performed by: INTERNAL MEDICINE

## 2023-10-30 PROCEDURE — 99999 PR PBB SHADOW E&M-EST. PATIENT-LVL III: CPT | Mod: PBBFAC,,, | Performed by: INTERNAL MEDICINE

## 2023-10-30 PROCEDURE — 3078F PR MOST RECENT DIASTOLIC BLOOD PRESSURE < 80 MM HG: ICD-10-PCS | Mod: CPTII,S$GLB,, | Performed by: INTERNAL MEDICINE

## 2023-10-30 PROCEDURE — 77080 DXA BONE DENSITY AXIAL SKELETON 1 OR MORE SITES: ICD-10-PCS | Mod: 26,,, | Performed by: INTERNAL MEDICINE

## 2023-10-30 PROCEDURE — 99999 PR PBB SHADOW E&M-EST. PATIENT-LVL III: ICD-10-PCS | Mod: PBBFAC,,, | Performed by: INTERNAL MEDICINE

## 2023-10-30 PROCEDURE — 3008F PR BODY MASS INDEX (BMI) DOCUMENTED: ICD-10-PCS | Mod: CPTII,S$GLB,, | Performed by: INTERNAL MEDICINE

## 2023-10-30 RX ORDER — AZATHIOPRINE 50 MG/1
50 TABLET ORAL 2 TIMES DAILY
Qty: 180 TABLET | Refills: 0 | Status: SHIPPED | OUTPATIENT
Start: 2023-10-30 | End: 2023-11-27 | Stop reason: SDUPTHER

## 2023-10-30 RX ORDER — BELIMUMAB 200 MG/ML
SOLUTION SUBCUTANEOUS
Qty: 4 EACH | Refills: 5 | Status: ACTIVE | OUTPATIENT
Start: 2023-10-30

## 2023-10-30 ASSESSMENT — SYSTEMIC LUPUS ERYTHEMATOSUS DISEASE ACTIVITY INDEX (SLEDAI): TOTAL_SCORE: 0

## 2023-10-30 NOTE — PROGRESS NOTES
10/30/2023     9:24 AM   Rapid3 Question Responses and Scores   MDHAQ Score 0.4   Psychologic Score 1.1   Pain Score 0   When you awakened in the morning OVER THE LAST WEEK, did you feel stiff? Yes   If Yes, please indicate the number of hours until you are as limber as you will be for the day 2   Fatigue Score 0   Global Health Score 3   RAPID3 Score 1.44     Answers submitted by the patient for this visit:  Rheumatology Questionnaire (Submitted on 10/30/2023)  fever: No  eye redness: No  mouth sores: No  headaches: No  shortness of breath: No  chest pain: No  trouble swallowing: No  diarrhea: No  constipation: No  unexpected weight change: No  genital sore: No  dysuria: No  During the last 3 days, have you had a skin rash?: No  Bruises or bleeds easily: No  cough: No

## 2023-10-30 NOTE — PROGRESS NOTES
"Subjective:      Patient ID: Madeleine Nunez is a 58 y.o. female.    Chief Complaint: Disease Management    HPI  Review of Systems     Objective:   /74   Pulse 72   Ht 5' 4" (1.626 m)   Wt 60.8 kg (134 lb)   LMP  (LMP Unknown)   BMI 23.00 kg/m²   Physical Exam     2/9/2022 6/22/2023   Tender (RAO-28) 1 / 28  3 / 28    Swollen (RAO-28) 1 / 28 0 / 28    Provider Global -- --   Patient Global 0 mm 30 mm   ESR 3 mm/hr 3 mm/hr   CRP 0.3 mg/L 0.6 mg/L   RAO-28 (ESR) 1.61 (Remission) 2.16 (Remission)   RAO-28 (CRP) 1.89 (Remission) 2.52 (Remission)   CDAI Score -- --        Assessment:     No diagnosis found.      Plan:     Problem List Items Addressed This Visit    None    ***    "

## 2023-10-30 NOTE — PROGRESS NOTES
"Subjective:      Patient ID: Madeleine Nunez is a 58 y.o. female.    Chief Complaint: SLE    HPI Notes increased stiffness michael ankles and wrists since decreasing azathioprine from 100mg daily to 75mg daily  SLEDAI 0  Review of Systems   Constitutional:  Negative for appetite change, fatigue, fever and unexpected weight change.   HENT:  Negative for mouth sores and trouble swallowing.    Eyes:  Negative for redness and visual disturbance.   Respiratory:  Negative for cough, shortness of breath and wheezing.    Cardiovascular:  Negative for chest pain and palpitations.   Gastrointestinal:  Negative for abdominal pain, anal bleeding, blood in stool, constipation, diarrhea, nausea and vomiting.   Genitourinary:  Negative for dysuria, frequency, genital sores and urgency.   Musculoskeletal:  Negative for arthralgias, back pain, gait problem, joint swelling, myalgias, neck pain and neck stiffness.   Skin:  Negative for rash.   Neurological:  Negative for weakness, numbness and headaches.   Hematological:  Negative for adenopathy. Does not bruise/bleed easily.   Psychiatric/Behavioral:  Negative for sleep disturbance. The patient is not nervous/anxious.         Objective:   /74   Pulse 72   Ht 5' 4" (1.626 m)   Wt 60.8 kg (134 lb)   LMP  (LMP Unknown)   BMI 23.00 kg/m²   Physical Exam   Constitutional: She is oriented to person, place, and time.   HENT:   Head: Normocephalic and atraumatic.   Mouth/Throat: Oropharynx is clear and moist.   Eyes: Pupils are equal, round, and reactive to light. Conjunctivae are normal.   Neck: No thyromegaly present.   No cervical bruits   Cardiovascular: Normal rate, regular rhythm and normal heart sounds. Exam reveals no gallop and no friction rub.   No murmur heard.  Pulmonary/Chest: Breath sounds normal. She has no wheezes. She has no rales. She exhibits no tenderness.   Abdominal: Soft. She exhibits no distension and no mass. There is no abdominal tenderness. "   Musculoskeletal:      Cervical back: Normal range of motion and neck supple.   Lymphadenopathy:     She has no cervical adenopathy.   Neurological: She is alert and oriented to person, place, and time. She displays normal reflexes. She exhibits normal muscle tone. Gait normal.   Motor strength nl. UE and LE bilateral   Skin: Skin is warm and dry. No rash noted. No erythema. No pallor.   Psychiatric: Mood, memory, affect and judgment normal.        2/9/2022 6/22/2023   Tender (RAO-28) 1 / 28  3 / 28    Swollen (RAO-28) 1 / 28 0 / 28    Provider Global -- --   Patient Global 0 mm 30 mm   ESR 3 mm/hr 3 mm/hr   CRP 0.3 mg/L 0.6 mg/L   RAO-28 (ESR) 1.61 (Remission) 2.16 (Remission)   RAO-28 (CRP) 1.89 (Remission) 2.52 (Remission)   CDAI Score -- --      Latest Reference Range & Units 09/29/23 08:12 10/02/23 08:40 10/12/23 09:59   WBC 3.90 - 12.70 K/uL 3.04 (L)  3.78 (L)   RBC 4.00 - 5.40 M/uL 4.28  4.35   Hemoglobin 12.0 - 16.0 g/dL 13.9  14.1   Hematocrit 37.0 - 48.5 % 41.3  42.8   MCV 82 - 98 fL 97  98   MCH 27.0 - 31.0 pg 32.5 (H)  32.4 (H)   MCHC 32.0 - 36.0 g/dL 33.7  32.9   RDW 11.5 - 14.5 % 12.1  12.2   Platelet Count 150 - 450 K/uL 226  239   MPV 9.2 - 12.9 fL 11.1  10.9   Gran % 38.0 - 73.0 % 59.9  67.6   Lymph % 18.0 - 48.0 % 19.4  18.0   Mono % 4.0 - 15.0 % 16.8 (H)  11.4   Eosinophil % 0.0 - 8.0 % 2.6  1.6   Basophil % 0.0 - 1.9 % 1.0  1.1   Immature Granulocytes 0.0 - 0.5 % 0.3  0.3   Gran # (ANC) 1.8 - 7.7 K/uL 1.8  2.6   Lymph # 1.0 - 4.8 K/uL 0.6 (L)  0.7 (L)   Mono # 0.3 - 1.0 K/uL 0.5  0.4   Eos # 0.0 - 0.5 K/uL 0.1  0.1   Baso # 0.00 - 0.20 K/uL 0.03  0.04   Immature Grans (Abs) 0.00 - 0.04 K/uL 0.01  0.01   nRBC 0 /100 WBC 0  0   Differential Method  Automated  Automated   Sed Rate 0 - 36 mm/Hr 6     Sodium 136 - 145 mmol/L 136     Potassium 3.5 - 5.1 mmol/L 3.9     Chloride 95 - 110 mmol/L 104     CO2 23 - 29 mmol/L 28     Anion Gap 8 - 16 mmol/L 4 (L)     BUN 6 - 20 mg/dL 14     Creatinine 0.5 -  1.4 mg/dL 0.7     eGFR >60 mL/min/1.73 m^2 >60.0     Glucose 70 - 110 mg/dL 88     Calcium 8.7 - 10.5 mg/dL 8.7     ALP 55 - 135 U/L 67     PROTEIN TOTAL 6.0 - 8.4 g/dL 6.4     Albumin 3.5 - 5.2 g/dL 3.6     BILIRUBIN TOTAL 0.1 - 1.0 mg/dL 0.4     AST 10 - 40 U/L 16     ALT 10 - 44 U/L 9 (L)     CRP 0.0 - 8.2 mg/L 0.4     ds DNA Ab Negative 1:10  Negative 1:10     Complement (C-3) 50 - 180 mg/dL 95     Complement (C-4) 11 - 44 mg/dL 15     EKG 12-LEAD   Rpt    (L): Data is abnormally low  (H): Data is abnormally high  Rpt: View report in Results Review for more information      Latest Reference Range & Units 09/29/23 07:59   Specimen UA  Urine, Clean Catch   Color, UA Yellow, Straw, Yvonne  Yellow   Appearance, UA Clear  Hazy !   Specific Gravity, UA 1.005 - 1.030  1.020   pH, UA 5.0 - 8.0  5.0   Protein, UA Negative  Negative   Glucose, UA Negative  Negative   Ketones, UA Negative  Negative   Occult Blood UA Negative  Negative   NITRITE UA Negative  Negative   Bilirubin (UA) Negative  Negative   Leukocytes, UA Negative  Negative   WBC, UA 0 - 5 /hpf 0   Bacteria, UA None-Occ /hpf Rare   Squam Epithel, UA /hpf 10   Ca Oxalate Melissa, UA None-Moderate  Few   Microscopic Comment  SEE COMMENT   Creatinine, Urine 15.0 - 325.0 mg/dL 139.0   Urine Protein 0 - 15 mg/dL <7   Prot/Creat Ratio, Urine 0.00 - 0.20  Unable to calculate   !: Data is abnormal  Assessment:   SLE: SLEDAI 0 . Tapered off prednisone as of 4/4/21 after 30 years for SLE.   OA hands, left knee asymptomatic   moderate lymphopenia: azathioprine>SLE stable  PMB s/p  hysterectomy  Meniere's Disease s/p shunt procedure  S/p breast biopsy, benign  Plan:   *new Covid vaccine  Increase azathioprine back to  50mg twice daily  CBC in  1 month  Cont belimumab 200mg sc q wk for lupus arthritis and as steroid   sparing agent  Diclofenac gel prn for OA joints  1200 mg dietary calcium daily per handout(avoid tabs given h/o kidney stones)  Vitamin D 3 1000 units daily  F/u  Dr. Finley for Meniere's   Standing lupus labs q 3 months  DXA today  RTC 6 months

## 2023-11-17 ENCOUNTER — PATIENT MESSAGE (OUTPATIENT)
Dept: RHEUMATOLOGY | Facility: CLINIC | Age: 58
End: 2023-11-17
Payer: COMMERCIAL

## 2023-11-27 DIAGNOSIS — M32.19 OTHER SYSTEMIC LUPUS ERYTHEMATOSUS WITH OTHER ORGAN INVOLVEMENT: ICD-10-CM

## 2023-11-27 RX ORDER — AZATHIOPRINE 50 MG/1
50 TABLET ORAL 2 TIMES DAILY
Qty: 180 TABLET | Refills: 0 | Status: SHIPPED | OUTPATIENT
Start: 2023-11-27

## 2023-11-30 ENCOUNTER — LAB VISIT (OUTPATIENT)
Dept: LAB | Facility: HOSPITAL | Age: 58
End: 2023-11-30
Attending: INTERNAL MEDICINE
Payer: COMMERCIAL

## 2023-11-30 DIAGNOSIS — M32.9 SYSTEMIC LUPUS ERYTHEMATOSUS, UNSPECIFIED SLE TYPE, UNSPECIFIED ORGAN INVOLVEMENT STATUS: ICD-10-CM

## 2023-11-30 LAB
BASOPHILS # BLD AUTO: 0.04 K/UL (ref 0–0.2)
BASOPHILS NFR BLD: 0.6 % (ref 0–1.9)
DIFFERENTIAL METHOD: ABNORMAL
EOSINOPHIL # BLD AUTO: 0.1 K/UL (ref 0–0.5)
EOSINOPHIL NFR BLD: 1 % (ref 0–8)
ERYTHROCYTE [DISTWIDTH] IN BLOOD BY AUTOMATED COUNT: 11.9 % (ref 11.5–14.5)
HCT VFR BLD AUTO: 42.2 % (ref 37–48.5)
HGB BLD-MCNC: 14.3 G/DL (ref 12–16)
IMM GRANULOCYTES # BLD AUTO: 0.02 K/UL (ref 0–0.04)
IMM GRANULOCYTES NFR BLD AUTO: 0.3 % (ref 0–0.5)
LYMPHOCYTES # BLD AUTO: 0.7 K/UL (ref 1–4.8)
LYMPHOCYTES NFR BLD: 11.6 % (ref 18–48)
MCH RBC QN AUTO: 31.9 PG (ref 27–31)
MCHC RBC AUTO-ENTMCNC: 33.9 G/DL (ref 32–36)
MCV RBC AUTO: 94 FL (ref 82–98)
MONOCYTES # BLD AUTO: 0.6 K/UL (ref 0.3–1)
MONOCYTES NFR BLD: 9.1 % (ref 4–15)
NEUTROPHILS # BLD AUTO: 4.9 K/UL (ref 1.8–7.7)
NEUTROPHILS NFR BLD: 77.4 % (ref 38–73)
NRBC BLD-RTO: 0 /100 WBC
PLATELET # BLD AUTO: 253 K/UL (ref 150–450)
PMV BLD AUTO: 10.7 FL (ref 9.2–12.9)
RBC # BLD AUTO: 4.48 M/UL (ref 4–5.4)
WBC # BLD AUTO: 6.28 K/UL (ref 3.9–12.7)

## 2023-11-30 PROCEDURE — 36415 COLL VENOUS BLD VENIPUNCTURE: CPT | Mod: PN | Performed by: INTERNAL MEDICINE

## 2023-11-30 PROCEDURE — 85025 COMPLETE CBC W/AUTO DIFF WBC: CPT | Performed by: INTERNAL MEDICINE

## 2024-01-02 ENCOUNTER — PATIENT MESSAGE (OUTPATIENT)
Dept: RHEUMATOLOGY | Facility: CLINIC | Age: 59
End: 2024-01-02
Payer: COMMERCIAL

## 2024-02-02 ENCOUNTER — LAB VISIT (OUTPATIENT)
Dept: LAB | Facility: HOSPITAL | Age: 59
End: 2024-02-02
Attending: INTERNAL MEDICINE
Payer: COMMERCIAL

## 2024-02-02 DIAGNOSIS — M32.9 SYSTEMIC LUPUS ERYTHEMATOSUS, UNSPECIFIED SLE TYPE, UNSPECIFIED ORGAN INVOLVEMENT STATUS: ICD-10-CM

## 2024-02-02 LAB
ALBUMIN SERPL BCP-MCNC: 3.8 G/DL (ref 3.5–5.2)
ALP SERPL-CCNC: 68 U/L (ref 55–135)
ALT SERPL W/O P-5'-P-CCNC: 9 U/L (ref 10–44)
ANION GAP SERPL CALC-SCNC: 6 MMOL/L (ref 8–16)
AST SERPL-CCNC: 16 U/L (ref 10–40)
BASOPHILS # BLD AUTO: 0.06 K/UL (ref 0–0.2)
BASOPHILS NFR BLD: 1.5 % (ref 0–1.9)
BILIRUB SERPL-MCNC: 0.5 MG/DL (ref 0.1–1)
BILIRUB UR QL STRIP: NEGATIVE
BUN SERPL-MCNC: 12 MG/DL (ref 6–20)
C3 SERPL-MCNC: 112 MG/DL (ref 50–180)
C4 SERPL-MCNC: 19 MG/DL (ref 11–44)
CALCIUM SERPL-MCNC: 9 MG/DL (ref 8.7–10.5)
CHLORIDE SERPL-SCNC: 104 MMOL/L (ref 95–110)
CLARITY UR REFRACT.AUTO: CLEAR
CO2 SERPL-SCNC: 28 MMOL/L (ref 23–29)
COLOR UR AUTO: YELLOW
CREAT SERPL-MCNC: 0.7 MG/DL (ref 0.5–1.4)
CRP SERPL-MCNC: 0.4 MG/L (ref 0–8.2)
DIFFERENTIAL METHOD BLD: ABNORMAL
EOSINOPHIL # BLD AUTO: 0.1 K/UL (ref 0–0.5)
EOSINOPHIL NFR BLD: 2.8 % (ref 0–8)
ERYTHROCYTE [DISTWIDTH] IN BLOOD BY AUTOMATED COUNT: 12.1 % (ref 11.5–14.5)
ERYTHROCYTE [SEDIMENTATION RATE] IN BLOOD BY PHOTOMETRIC METHOD: 3 MM/HR (ref 0–36)
EST. GFR  (NO RACE VARIABLE): >60 ML/MIN/1.73 M^2
GLUCOSE SERPL-MCNC: 84 MG/DL (ref 70–110)
GLUCOSE UR QL STRIP: NEGATIVE
HCT VFR BLD AUTO: 41.4 % (ref 37–48.5)
HGB BLD-MCNC: 14 G/DL (ref 12–16)
HGB UR QL STRIP: NEGATIVE
IMM GRANULOCYTES # BLD AUTO: 0.01 K/UL (ref 0–0.04)
IMM GRANULOCYTES NFR BLD AUTO: 0.3 % (ref 0–0.5)
KETONES UR QL STRIP: NEGATIVE
LEUKOCYTE ESTERASE UR QL STRIP: NEGATIVE
LYMPHOCYTES # BLD AUTO: 0.6 K/UL (ref 1–4.8)
LYMPHOCYTES NFR BLD: 15.7 % (ref 18–48)
MCH RBC QN AUTO: 32.4 PG (ref 27–31)
MCHC RBC AUTO-ENTMCNC: 33.8 G/DL (ref 32–36)
MCV RBC AUTO: 96 FL (ref 82–98)
MONOCYTES # BLD AUTO: 0.4 K/UL (ref 0.3–1)
MONOCYTES NFR BLD: 10 % (ref 4–15)
NEUTROPHILS # BLD AUTO: 2.7 K/UL (ref 1.8–7.7)
NEUTROPHILS NFR BLD: 69.7 % (ref 38–73)
NITRITE UR QL STRIP: NEGATIVE
NRBC BLD-RTO: 0 /100 WBC
PH UR STRIP: 6 [PH] (ref 5–8)
PLATELET # BLD AUTO: 286 K/UL (ref 150–450)
PMV BLD AUTO: 10.7 FL (ref 9.2–12.9)
POTASSIUM SERPL-SCNC: 3.9 MMOL/L (ref 3.5–5.1)
PROT SERPL-MCNC: 6.9 G/DL (ref 6–8.4)
PROT UR QL STRIP: NEGATIVE
RBC # BLD AUTO: 4.32 M/UL (ref 4–5.4)
SODIUM SERPL-SCNC: 138 MMOL/L (ref 136–145)
SP GR UR STRIP: 1.02 (ref 1–1.03)
URN SPEC COLLECT METH UR: NORMAL
WBC # BLD AUTO: 3.89 K/UL (ref 3.9–12.7)

## 2024-02-02 PROCEDURE — 86160 COMPLEMENT ANTIGEN: CPT | Mod: 59 | Performed by: INTERNAL MEDICINE

## 2024-02-02 PROCEDURE — 80053 COMPREHEN METABOLIC PANEL: CPT | Performed by: INTERNAL MEDICINE

## 2024-02-02 PROCEDURE — 36415 COLL VENOUS BLD VENIPUNCTURE: CPT | Mod: PN | Performed by: INTERNAL MEDICINE

## 2024-02-02 PROCEDURE — 81003 URINALYSIS AUTO W/O SCOPE: CPT | Performed by: INTERNAL MEDICINE

## 2024-02-02 PROCEDURE — 85652 RBC SED RATE AUTOMATED: CPT | Performed by: INTERNAL MEDICINE

## 2024-02-02 PROCEDURE — 86225 DNA ANTIBODY NATIVE: CPT | Performed by: INTERNAL MEDICINE

## 2024-02-02 PROCEDURE — 85025 COMPLETE CBC W/AUTO DIFF WBC: CPT | Performed by: INTERNAL MEDICINE

## 2024-02-02 PROCEDURE — 86160 COMPLEMENT ANTIGEN: CPT | Performed by: INTERNAL MEDICINE

## 2024-02-02 PROCEDURE — 86140 C-REACTIVE PROTEIN: CPT | Performed by: INTERNAL MEDICINE

## 2024-02-05 LAB — DSDNA AB SER-ACNC: NORMAL [IU]/ML

## 2024-04-12 ENCOUNTER — TELEPHONE (OUTPATIENT)
Dept: RHEUMATOLOGY | Facility: CLINIC | Age: 59
End: 2024-04-12
Payer: COMMERCIAL

## 2024-04-12 NOTE — TELEPHONE ENCOUNTER
Returned patient's call concerning an appointment.  Message left to patient stating Dr. Bender is booked and she will be added to the wait list so when July opens she can schedule appointment.

## 2024-04-15 ENCOUNTER — PATIENT MESSAGE (OUTPATIENT)
Dept: RHEUMATOLOGY | Facility: CLINIC | Age: 59
End: 2024-04-15
Payer: COMMERCIAL

## 2024-04-22 ENCOUNTER — TELEPHONE (OUTPATIENT)
Dept: RHEUMATOLOGY | Facility: CLINIC | Age: 59
End: 2024-04-22
Payer: COMMERCIAL

## 2024-04-22 NOTE — TELEPHONE ENCOUNTER
Returned patient's call concerning appointment.  No answer.  Message left for return call to clinic.

## 2024-04-24 DIAGNOSIS — M32.9 SYSTEMIC LUPUS ERYTHEMATOSUS, UNSPECIFIED SLE TYPE, UNSPECIFIED ORGAN INVOLVEMENT STATUS: ICD-10-CM

## 2024-04-24 RX ORDER — BELIMUMAB 200 MG/ML
SOLUTION SUBCUTANEOUS
Qty: 4 EACH | Refills: 5 | Status: SHIPPED | OUTPATIENT
Start: 2024-04-24

## 2024-05-09 ENCOUNTER — LAB VISIT (OUTPATIENT)
Dept: LAB | Facility: HOSPITAL | Age: 59
End: 2024-05-09
Attending: INTERNAL MEDICINE
Payer: COMMERCIAL

## 2024-05-09 DIAGNOSIS — M32.9 SYSTEMIC LUPUS ERYTHEMATOSUS, UNSPECIFIED SLE TYPE, UNSPECIFIED ORGAN INVOLVEMENT STATUS: ICD-10-CM

## 2024-05-09 PROCEDURE — 85652 RBC SED RATE AUTOMATED: CPT | Performed by: INTERNAL MEDICINE

## 2024-05-09 PROCEDURE — 86140 C-REACTIVE PROTEIN: CPT | Performed by: INTERNAL MEDICINE

## 2024-05-09 PROCEDURE — 81003 URINALYSIS AUTO W/O SCOPE: CPT | Performed by: INTERNAL MEDICINE

## 2024-05-09 PROCEDURE — 86160 COMPLEMENT ANTIGEN: CPT | Mod: 59 | Performed by: INTERNAL MEDICINE

## 2024-05-09 PROCEDURE — 36415 COLL VENOUS BLD VENIPUNCTURE: CPT | Mod: PN | Performed by: INTERNAL MEDICINE

## 2024-05-09 PROCEDURE — 86160 COMPLEMENT ANTIGEN: CPT | Performed by: INTERNAL MEDICINE

## 2024-05-09 PROCEDURE — 86225 DNA ANTIBODY NATIVE: CPT | Performed by: INTERNAL MEDICINE

## 2024-05-09 PROCEDURE — 80053 COMPREHEN METABOLIC PANEL: CPT | Performed by: INTERNAL MEDICINE

## 2024-05-09 PROCEDURE — 85025 COMPLETE CBC W/AUTO DIFF WBC: CPT | Performed by: INTERNAL MEDICINE

## 2024-05-09 PROCEDURE — 84156 ASSAY OF PROTEIN URINE: CPT | Performed by: INTERNAL MEDICINE

## 2024-05-10 LAB
ALBUMIN SERPL BCP-MCNC: 3.7 G/DL (ref 3.5–5.2)
ALP SERPL-CCNC: 65 U/L (ref 55–135)
ALT SERPL W/O P-5'-P-CCNC: 10 U/L (ref 10–44)
ANION GAP SERPL CALC-SCNC: 9 MMOL/L (ref 8–16)
AST SERPL-CCNC: 19 U/L (ref 10–40)
BASOPHILS # BLD AUTO: 0.04 K/UL (ref 0–0.2)
BASOPHILS NFR BLD: 0.9 % (ref 0–1.9)
BILIRUB SERPL-MCNC: 0.4 MG/DL (ref 0.1–1)
BILIRUB UR QL STRIP: NEGATIVE
BUN SERPL-MCNC: 15 MG/DL (ref 6–20)
C3 SERPL-MCNC: 96 MG/DL (ref 50–180)
C4 SERPL-MCNC: 13 MG/DL (ref 11–44)
CALCIUM SERPL-MCNC: 8.9 MG/DL (ref 8.7–10.5)
CHLORIDE SERPL-SCNC: 105 MMOL/L (ref 95–110)
CLARITY UR REFRACT.AUTO: ABNORMAL
CO2 SERPL-SCNC: 26 MMOL/L (ref 23–29)
COLOR UR AUTO: ABNORMAL
CREAT SERPL-MCNC: 0.8 MG/DL (ref 0.5–1.4)
CREAT UR-MCNC: 254 MG/DL (ref 15–325)
CRP SERPL-MCNC: 0.6 MG/L (ref 0–8.2)
DIFFERENTIAL METHOD BLD: ABNORMAL
EOSINOPHIL # BLD AUTO: 0.1 K/UL (ref 0–0.5)
EOSINOPHIL NFR BLD: 1.3 % (ref 0–8)
ERYTHROCYTE [DISTWIDTH] IN BLOOD BY AUTOMATED COUNT: 11.9 % (ref 11.5–14.5)
ERYTHROCYTE [SEDIMENTATION RATE] IN BLOOD BY PHOTOMETRIC METHOD: <2 MM/HR (ref 0–36)
EST. GFR  (NO RACE VARIABLE): >60 ML/MIN/1.73 M^2
GLUCOSE SERPL-MCNC: 86 MG/DL (ref 70–110)
GLUCOSE UR QL STRIP: NEGATIVE
HCT VFR BLD AUTO: 40.2 % (ref 37–48.5)
HGB BLD-MCNC: 14 G/DL (ref 12–16)
HGB UR QL STRIP: NEGATIVE
IMM GRANULOCYTES # BLD AUTO: 0.02 K/UL (ref 0–0.04)
IMM GRANULOCYTES NFR BLD AUTO: 0.4 % (ref 0–0.5)
KETONES UR QL STRIP: NEGATIVE
LEUKOCYTE ESTERASE UR QL STRIP: NEGATIVE
LYMPHOCYTES # BLD AUTO: 0.8 K/UL (ref 1–4.8)
LYMPHOCYTES NFR BLD: 17 % (ref 18–48)
MCH RBC QN AUTO: 32.9 PG (ref 27–31)
MCHC RBC AUTO-ENTMCNC: 34.8 G/DL (ref 32–36)
MCV RBC AUTO: 95 FL (ref 82–98)
MONOCYTES # BLD AUTO: 0.5 K/UL (ref 0.3–1)
MONOCYTES NFR BLD: 10.6 % (ref 4–15)
NEUTROPHILS # BLD AUTO: 3.2 K/UL (ref 1.8–7.7)
NEUTROPHILS NFR BLD: 69.8 % (ref 38–73)
NITRITE UR QL STRIP: NEGATIVE
NRBC BLD-RTO: 0 /100 WBC
PH UR STRIP: 6 [PH] (ref 5–8)
PLATELET # BLD AUTO: 247 K/UL (ref 150–450)
PMV BLD AUTO: 11.1 FL (ref 9.2–12.9)
POTASSIUM SERPL-SCNC: 3.5 MMOL/L (ref 3.5–5.1)
PROT SERPL-MCNC: 6.4 G/DL (ref 6–8.4)
PROT UR QL STRIP: ABNORMAL
PROT UR-MCNC: <7 MG/DL (ref 0–15)
PROT/CREAT UR: NORMAL MG/G{CREAT} (ref 0–0.2)
RBC # BLD AUTO: 4.25 M/UL (ref 4–5.4)
SODIUM SERPL-SCNC: 140 MMOL/L (ref 136–145)
SP GR UR STRIP: 1.03 (ref 1–1.03)
URN SPEC COLLECT METH UR: ABNORMAL
WBC # BLD AUTO: 4.64 K/UL (ref 3.9–12.7)

## 2024-05-13 LAB — DSDNA AB SER-ACNC: NORMAL [IU]/ML

## 2024-07-15 DIAGNOSIS — M32.19 OTHER SYSTEMIC LUPUS ERYTHEMATOSUS WITH OTHER ORGAN INVOLVEMENT: ICD-10-CM

## 2024-07-16 ENCOUNTER — TELEPHONE (OUTPATIENT)
Dept: RHEUMATOLOGY | Facility: CLINIC | Age: 59
End: 2024-07-16
Payer: COMMERCIAL

## 2024-07-16 DIAGNOSIS — M32.9 SYSTEMIC LUPUS ERYTHEMATOSUS, UNSPECIFIED SLE TYPE, UNSPECIFIED ORGAN INVOLVEMENT STATUS: Primary | ICD-10-CM

## 2024-07-16 RX ORDER — AZATHIOPRINE 50 MG/1
50 TABLET ORAL 2 TIMES DAILY
Qty: 60 TABLET | Refills: 0 | Status: SHIPPED | OUTPATIENT
Start: 2024-07-16 | End: 2024-07-23 | Stop reason: SDUPTHER

## 2024-07-19 ENCOUNTER — LAB VISIT (OUTPATIENT)
Dept: LAB | Facility: HOSPITAL | Age: 59
End: 2024-07-19
Attending: INTERNAL MEDICINE
Payer: COMMERCIAL

## 2024-07-19 DIAGNOSIS — M32.9 SYSTEMIC LUPUS ERYTHEMATOSUS, UNSPECIFIED SLE TYPE, UNSPECIFIED ORGAN INVOLVEMENT STATUS: ICD-10-CM

## 2024-07-19 LAB
ALBUMIN SERPL BCP-MCNC: 3.8 G/DL (ref 3.5–5.2)
ALP SERPL-CCNC: 66 U/L (ref 55–135)
ALT SERPL W/O P-5'-P-CCNC: 8 U/L (ref 10–44)
ANION GAP SERPL CALC-SCNC: 5 MMOL/L (ref 8–16)
AST SERPL-CCNC: 16 U/L (ref 10–40)
BASOPHILS # BLD AUTO: 0.03 K/UL (ref 0–0.2)
BASOPHILS NFR BLD: 0.8 % (ref 0–1.9)
BILIRUB SERPL-MCNC: 0.6 MG/DL (ref 0.1–1)
BUN SERPL-MCNC: 10 MG/DL (ref 6–20)
C3 SERPL-MCNC: 94 MG/DL (ref 50–180)
C4 SERPL-MCNC: 15 MG/DL (ref 11–44)
CALCIUM SERPL-MCNC: 8.9 MG/DL (ref 8.7–10.5)
CHLORIDE SERPL-SCNC: 108 MMOL/L (ref 95–110)
CO2 SERPL-SCNC: 27 MMOL/L (ref 23–29)
CREAT SERPL-MCNC: 0.7 MG/DL (ref 0.5–1.4)
CRP SERPL-MCNC: 0.5 MG/L (ref 0–8.2)
DIFFERENTIAL METHOD BLD: ABNORMAL
EOSINOPHIL # BLD AUTO: 0.1 K/UL (ref 0–0.5)
EOSINOPHIL NFR BLD: 2.3 % (ref 0–8)
ERYTHROCYTE [DISTWIDTH] IN BLOOD BY AUTOMATED COUNT: 12.3 % (ref 11.5–14.5)
ERYTHROCYTE [SEDIMENTATION RATE] IN BLOOD BY PHOTOMETRIC METHOD: <2 MM/HR (ref 0–36)
EST. GFR  (NO RACE VARIABLE): >60 ML/MIN/1.73 M^2
GLUCOSE SERPL-MCNC: 81 MG/DL (ref 70–110)
HCT VFR BLD AUTO: 40.9 % (ref 37–48.5)
HGB BLD-MCNC: 14.3 G/DL (ref 12–16)
IMM GRANULOCYTES # BLD AUTO: 0.01 K/UL (ref 0–0.04)
IMM GRANULOCYTES NFR BLD AUTO: 0.3 % (ref 0–0.5)
LYMPHOCYTES # BLD AUTO: 0.7 K/UL (ref 1–4.8)
LYMPHOCYTES NFR BLD: 20 % (ref 18–48)
MCH RBC QN AUTO: 33.3 PG (ref 27–31)
MCHC RBC AUTO-ENTMCNC: 35 G/DL (ref 32–36)
MCV RBC AUTO: 95 FL (ref 82–98)
MONOCYTES # BLD AUTO: 0.5 K/UL (ref 0.3–1)
MONOCYTES NFR BLD: 13.8 % (ref 4–15)
NEUTROPHILS # BLD AUTO: 2.2 K/UL (ref 1.8–7.7)
NEUTROPHILS NFR BLD: 62.8 % (ref 38–73)
NRBC BLD-RTO: 0 /100 WBC
PLATELET # BLD AUTO: 223 K/UL (ref 150–450)
PMV BLD AUTO: 11 FL (ref 9.2–12.9)
POTASSIUM SERPL-SCNC: 3.9 MMOL/L (ref 3.5–5.1)
PROT SERPL-MCNC: 6.7 G/DL (ref 6–8.4)
RBC # BLD AUTO: 4.3 M/UL (ref 4–5.4)
SODIUM SERPL-SCNC: 140 MMOL/L (ref 136–145)
WBC # BLD AUTO: 3.55 K/UL (ref 3.9–12.7)

## 2024-07-19 PROCEDURE — 86160 COMPLEMENT ANTIGEN: CPT | Performed by: INTERNAL MEDICINE

## 2024-07-19 PROCEDURE — 36415 COLL VENOUS BLD VENIPUNCTURE: CPT | Mod: PN | Performed by: INTERNAL MEDICINE

## 2024-07-19 PROCEDURE — 85025 COMPLETE CBC W/AUTO DIFF WBC: CPT | Performed by: INTERNAL MEDICINE

## 2024-07-19 PROCEDURE — 80053 COMPREHEN METABOLIC PANEL: CPT | Performed by: INTERNAL MEDICINE

## 2024-07-19 PROCEDURE — 85652 RBC SED RATE AUTOMATED: CPT | Performed by: INTERNAL MEDICINE

## 2024-07-19 PROCEDURE — 86140 C-REACTIVE PROTEIN: CPT | Performed by: INTERNAL MEDICINE

## 2024-07-19 PROCEDURE — 86160 COMPLEMENT ANTIGEN: CPT | Mod: 59 | Performed by: INTERNAL MEDICINE

## 2024-07-19 PROCEDURE — 86225 DNA ANTIBODY NATIVE: CPT | Performed by: INTERNAL MEDICINE

## 2024-07-22 LAB — DSDNA AB SER-ACNC: NORMAL [IU]/ML

## 2024-07-23 ENCOUNTER — OFFICE VISIT (OUTPATIENT)
Dept: RHEUMATOLOGY | Facility: CLINIC | Age: 59
End: 2024-07-23
Payer: COMMERCIAL

## 2024-07-23 VITALS
BODY MASS INDEX: 23.56 KG/M2 | WEIGHT: 138 LBS | DIASTOLIC BLOOD PRESSURE: 65 MMHG | SYSTOLIC BLOOD PRESSURE: 102 MMHG | HEIGHT: 64 IN | HEART RATE: 66 BPM

## 2024-07-23 DIAGNOSIS — M32.9 SYSTEMIC LUPUS ERYTHEMATOSUS, UNSPECIFIED SLE TYPE, UNSPECIFIED ORGAN INVOLVEMENT STATUS: ICD-10-CM

## 2024-07-23 DIAGNOSIS — M32.19 OTHER SYSTEMIC LUPUS ERYTHEMATOSUS WITH OTHER ORGAN INVOLVEMENT: ICD-10-CM

## 2024-07-23 PROCEDURE — 99214 OFFICE O/P EST MOD 30 MIN: CPT | Mod: S$GLB,,, | Performed by: INTERNAL MEDICINE

## 2024-07-23 PROCEDURE — 1159F MED LIST DOCD IN RCRD: CPT | Mod: CPTII,S$GLB,, | Performed by: INTERNAL MEDICINE

## 2024-07-23 PROCEDURE — 3074F SYST BP LT 130 MM HG: CPT | Mod: CPTII,S$GLB,, | Performed by: INTERNAL MEDICINE

## 2024-07-23 PROCEDURE — 3008F BODY MASS INDEX DOCD: CPT | Mod: CPTII,S$GLB,, | Performed by: INTERNAL MEDICINE

## 2024-07-23 PROCEDURE — 3078F DIAST BP <80 MM HG: CPT | Mod: CPTII,S$GLB,, | Performed by: INTERNAL MEDICINE

## 2024-07-23 PROCEDURE — 99999 PR PBB SHADOW E&M-EST. PATIENT-LVL III: CPT | Mod: PBBFAC,,, | Performed by: INTERNAL MEDICINE

## 2024-07-23 RX ORDER — BELIMUMAB 200 MG/ML
SOLUTION SUBCUTANEOUS
Qty: 4 EACH | Refills: 5 | Status: SHIPPED | OUTPATIENT
Start: 2024-07-23

## 2024-07-23 RX ORDER — AZATHIOPRINE 50 MG/1
50 TABLET ORAL 2 TIMES DAILY
Qty: 60 TABLET | Refills: 0 | Status: SHIPPED | OUTPATIENT
Start: 2024-07-23

## 2024-07-23 ASSESSMENT — SYSTEMIC LUPUS ERYTHEMATOSUS DISEASE ACTIVITY INDEX (SLEDAI): TOTAL_SCORE: 0

## 2024-07-23 NOTE — PROGRESS NOTES
7/16/2024     1:15 PM   Rapid3 Question Responses and Scores   MDHAQ Score 0.4   Psychologic Score 0   Pain Score 1   When you awakened in the morning OVER THE LAST WEEK, did you feel stiff? Yes   Fatigue Score 5   Global Health Score 2   RAPID3 Score 1.44     Answers submitted by the patient for this visit:  Rheumatology Questionnaire (Submitted on 7/16/2024)  fever: No  eye redness: No  mouth sores: No  headaches: No  shortness of breath: No  chest pain: No  trouble swallowing: No  diarrhea: No  constipation: No  unexpected weight change: No  genital sore: No  dysuria: No  During the last 3 days, have you had a skin rash?: No  Bruises or bleeds easily: No  cough: No

## 2024-07-23 NOTE — PROGRESS NOTES
Subjective:      Patient ID: Madeleine Nunez is a 59 y.o. female.    Chief Complaint:SLE    HPI feels fine. Some increased stiffness hands michael with overuse, and especially left thumb cmc and left little finger pip. No fever, rash, serositis, oral ulcers. Hair fall is stable  Meniere's essentially resolved post shunt > 1 year ago.  Working on Mediterranean diet. Exercises at [employer]  Review of Systems   Constitutional:  Negative for appetite change, fatigue, fever and unexpected weight change.   HENT:  Negative for mouth sores and trouble swallowing.    Eyes:  Negative for redness and visual disturbance.   Respiratory:  Negative for cough, shortness of breath and wheezing.    Cardiovascular:  Negative for chest pain and palpitations.   Gastrointestinal:  Negative for abdominal pain, anal bleeding, blood in stool, constipation, diarrhea, nausea and vomiting.   Genitourinary:  Negative for dysuria, frequency, genital sores and urgency.   Musculoskeletal:  Negative for arthralgias, back pain, gait problem, joint swelling, myalgias, neck pain and neck stiffness.   Skin:  Negative for rash.   Neurological:  Negative for weakness, numbness and headaches.   Hematological:  Negative for adenopathy. Does not bruise/bleed easily.   Psychiatric/Behavioral:  Negative for sleep disturbance. The patient is not nervous/anxious.         Objective:   LMP  (LMP Unknown)   Physical Exam   Constitutional: She is oriented to person, place, and time.   HENT:   Head: Normocephalic and atraumatic.   Mouth/Throat: Oropharynx is clear and moist.   Eyes: Conjunctivae are normal.   Neck: No thyromegaly present.   Cardiovascular: Normal rate, regular rhythm and normal heart sounds. Exam reveals no gallop and no friction rub.   No murmur heard.  Pulmonary/Chest: Breath sounds normal. She has no wheezes. She has no rales. She exhibits no tenderness.   Abdominal: Soft. She exhibits no distension and no mass. There is no abdominal  tenderness.   Musculoskeletal:      Cervical back: Normal range of motion and neck supple.   Lymphadenopathy:     She has no cervical adenopathy.   Neurological: She is alert and oriented to person, place, and time. She displays normal reflexes. Gait normal.   Nl motor strength UE and LE bilateral       Left Side Rheumatological Exam     The patient is tender to palpation of the 1st CMC.    The patient has an enlarged 4th PIP and 1st CMC.           2/9/2022 6/22/2023   Tender (RAO-28) 1 / 28  3 / 28    Swollen (RAO-28) 1 / 28  0 / 28    Provider Global -- --   Patient Global 0 mm 30 mm   ESR 3 mm/hr 3 mm/hr   CRP 0.3 mg/L 0.6 mg/L   RAO-28 (ESR) 1.61 (Remission) 2.16 (Remission)   RAO-28 (CRP) 1.89 (Remission) 2.52 (Remission)   CDAI Score -- --         Latest Reference Range & Units 07/19/24 09:32   WBC 3.90 - 12.70 K/uL 3.55 (L)   RBC 4.00 - 5.40 M/uL 4.30   Hemoglobin 12.0 - 16.0 g/dL 14.3   Hematocrit 37.0 - 48.5 % 40.9   MCV 82 - 98 fL 95   MCH 27.0 - 31.0 pg 33.3 (H)   MCHC 32.0 - 36.0 g/dL 35.0   RDW 11.5 - 14.5 % 12.3   Platelet Count 150 - 450 K/uL 223   MPV 9.2 - 12.9 fL 11.0   Gran % 38.0 - 73.0 % 62.8   Lymph % 18.0 - 48.0 % 20.0   Mono % 4.0 - 15.0 % 13.8   Eos % 0.0 - 8.0 % 2.3   Basophil % 0.0 - 1.9 % 0.8   Immature Granulocytes 0.0 - 0.5 % 0.3   Gran # (ANC) 1.8 - 7.7 K/uL 2.2   Lymph # 1.0 - 4.8 K/uL 0.7 (L)   Mono # 0.3 - 1.0 K/uL 0.5   Eos # 0.0 - 0.5 K/uL 0.1   Baso # 0.00 - 0.20 K/uL 0.03   Immature Grans (Abs) 0.00 - 0.04 K/uL 0.01   nRBC 0 /100 WBC 0   Differential Method  Automated   Sed Rate 0 - 36 mm/Hr <2   Sodium 136 - 145 mmol/L 140   Potassium 3.5 - 5.1 mmol/L 3.9   Chloride 95 - 110 mmol/L 108   CO2 23 - 29 mmol/L 27   Anion Gap 8 - 16 mmol/L 5 (L)   BUN 6 - 20 mg/dL 10   Creatinine 0.5 - 1.4 mg/dL 0.7   eGFR >60 mL/min/1.73 m^2 >60.0   Glucose 70 - 110 mg/dL 81   Calcium 8.7 - 10.5 mg/dL 8.9   ALP 55 - 135 U/L 66   PROTEIN TOTAL 6.0 - 8.4 g/dL 6.7   Albumin 3.5 - 5.2 g/dL 3.8    BILIRUBIN TOTAL 0.1 - 1.0 mg/dL 0.6   AST 10 - 40 U/L 16   ALT 10 - 44 U/L 8 (L)   CRP 0.0 - 8.2 mg/L 0.5   ds DNA Ab Negative 1:10  Negative 1:10   Complement (C-3) 50 - 180 mg/dL 94   Complement (C-4) 11 - 44 mg/dL 15   (L): Data is abnormally low  (H): Data is abnormally high  Assessment:     SLE: SLEDAI 0 . Tapered off prednisone as of 4/4/21 after 30 years for SLE.   OA hands michael left 1st cmc, left knee asymptomatic   moderate lymphopenia: azathioprine>SLE stable  PMB s/p  hysterectomy  Meniere's Disease s/p shunt procedure  S/p breast biopsy, benign  Normal BMD DXA 10/30/23    Plan:   Diclofenac gel to affected small finger joints,   Push cmc brace online  *Prevnar 20  *Fluad and Covid vaccine in the fall   Cont azathioprine 50mg twice daily  Cont belimumab 200mg sc q wk Diclofenac gel prn for OA joints  1200 mg dietary calcium daily per handout(avoid tabs given h/o kidney stones)  Vitamin D 3 1000 units daily  F/u Dr. Finley for Meniere's   Standing lupus labs q 3 months  Mediterranean diet  Cont exercise   RTC 6 months

## 2024-07-23 NOTE — PATIENT INSTRUCTIONS
*Prevnar 20  *Fluad and Covid vaccine in the fall   Cont azathioprine 50mg twice daily  Cont belimumab 200mg sc q wk   Diclofenac gel prn for OA joints  1200 mg dietary calcium daily per handout(avoid tabs given h/o kidney stones)  Vitamin D 3 1000 units daily  F/u Dr. Finley for Meniere's   Standing lupus labs q 3 months  Mediterranean diet  Cont exercise   RTC 6 months

## 2024-08-18 DIAGNOSIS — M32.19 OTHER SYSTEMIC LUPUS ERYTHEMATOSUS WITH OTHER ORGAN INVOLVEMENT: ICD-10-CM

## 2024-08-19 ENCOUNTER — TELEPHONE (OUTPATIENT)
Dept: RHEUMATOLOGY | Facility: CLINIC | Age: 59
End: 2024-08-19
Payer: COMMERCIAL

## 2024-08-19 RX ORDER — AZATHIOPRINE 50 MG/1
50 TABLET ORAL 2 TIMES DAILY
Qty: 60 TABLET | Refills: 1 | Status: SHIPPED | OUTPATIENT
Start: 2024-08-19

## 2024-09-09 DIAGNOSIS — Z80.3 FAMILY HISTORY OF MALIGNANT NEOPLASM OF BREAST: Primary | ICD-10-CM

## 2024-09-15 DIAGNOSIS — M32.19 OTHER SYSTEMIC LUPUS ERYTHEMATOSUS WITH OTHER ORGAN INVOLVEMENT: ICD-10-CM

## 2024-09-17 RX ORDER — AZATHIOPRINE 50 MG/1
50 TABLET ORAL 2 TIMES DAILY
Qty: 60 TABLET | Refills: 1 | Status: SHIPPED | OUTPATIENT
Start: 2024-09-17

## 2024-09-19 DIAGNOSIS — M32.9 SYSTEMIC LUPUS ERYTHEMATOSUS, UNSPECIFIED SLE TYPE, UNSPECIFIED ORGAN INVOLVEMENT STATUS: ICD-10-CM

## 2024-09-19 RX ORDER — BELIMUMAB 200 MG/ML
SOLUTION SUBCUTANEOUS
Qty: 4 EACH | Refills: 5 | Status: ACTIVE | OUTPATIENT
Start: 2024-09-19

## 2024-09-19 NOTE — TELEPHONE ENCOUNTER
----- Message from Taco Trujillo sent at 9/19/2024  3:13 PM CDT -----  Regarding: prior auth  Contact: [facility] @ [phone]  [facility] to advise that they are resubmitting the prior auth for pt's rx belimumab (BENLYSTA) 200 mg/mL AtIn  if the provider is wanting to resubmit the prior auth the process can be continued at cover my meds with the below key. Please call to advise further. Thank you for all you are doing.     Cover my med Key Number [id number]

## 2024-10-18 ENCOUNTER — LAB VISIT (OUTPATIENT)
Dept: LAB | Facility: HOSPITAL | Age: 59
End: 2024-10-18
Attending: INTERNAL MEDICINE
Payer: COMMERCIAL

## 2024-10-18 DIAGNOSIS — M32.9 SYSTEMIC LUPUS ERYTHEMATOSUS, UNSPECIFIED SLE TYPE, UNSPECIFIED ORGAN INVOLVEMENT STATUS: ICD-10-CM

## 2024-10-18 LAB
ALBUMIN SERPL BCP-MCNC: 3.7 G/DL (ref 3.5–5.2)
ALP SERPL-CCNC: 78 U/L (ref 40–150)
ALT SERPL W/O P-5'-P-CCNC: 11 U/L (ref 10–44)
ANION GAP SERPL CALC-SCNC: 7 MMOL/L (ref 8–16)
AST SERPL-CCNC: 20 U/L (ref 10–40)
BASOPHILS # BLD AUTO: 0.05 K/UL (ref 0–0.2)
BASOPHILS NFR BLD: 1.3 % (ref 0–1.9)
BILIRUB SERPL-MCNC: 0.3 MG/DL (ref 0.1–1)
BILIRUB UR QL STRIP: NEGATIVE
BUN SERPL-MCNC: 12 MG/DL (ref 6–20)
C3 SERPL-MCNC: 100 MG/DL (ref 50–180)
C4 SERPL-MCNC: 18 MG/DL (ref 11–44)
CALCIUM SERPL-MCNC: 8.8 MG/DL (ref 8.7–10.5)
CHLORIDE SERPL-SCNC: 106 MMOL/L (ref 95–110)
CLARITY UR REFRACT.AUTO: CLEAR
CO2 SERPL-SCNC: 26 MMOL/L (ref 23–29)
COLOR UR AUTO: YELLOW
CREAT SERPL-MCNC: 0.8 MG/DL (ref 0.5–1.4)
CRP SERPL-MCNC: 0.4 MG/L (ref 0–8.2)
DIFFERENTIAL METHOD BLD: ABNORMAL
EOSINOPHIL # BLD AUTO: 0.1 K/UL (ref 0–0.5)
EOSINOPHIL NFR BLD: 2.9 % (ref 0–8)
ERYTHROCYTE [DISTWIDTH] IN BLOOD BY AUTOMATED COUNT: 12.5 % (ref 11.5–14.5)
ERYTHROCYTE [SEDIMENTATION RATE] IN BLOOD BY PHOTOMETRIC METHOD: <2 MM/HR (ref 0–36)
EST. GFR  (NO RACE VARIABLE): >60 ML/MIN/1.73 M^2
GLUCOSE SERPL-MCNC: 85 MG/DL (ref 70–110)
GLUCOSE UR QL STRIP: NEGATIVE
HCT VFR BLD AUTO: 41.6 % (ref 37–48.5)
HGB BLD-MCNC: 13.8 G/DL (ref 12–16)
HGB UR QL STRIP: NEGATIVE
IMM GRANULOCYTES # BLD AUTO: 0.01 K/UL (ref 0–0.04)
IMM GRANULOCYTES NFR BLD AUTO: 0.3 % (ref 0–0.5)
KETONES UR QL STRIP: NEGATIVE
LEUKOCYTE ESTERASE UR QL STRIP: NEGATIVE
LYMPHOCYTES # BLD AUTO: 0.6 K/UL (ref 1–4.8)
LYMPHOCYTES NFR BLD: 16.6 % (ref 18–48)
MCH RBC QN AUTO: 32.2 PG (ref 27–31)
MCHC RBC AUTO-ENTMCNC: 33.2 G/DL (ref 32–36)
MCV RBC AUTO: 97 FL (ref 82–98)
MONOCYTES # BLD AUTO: 0.5 K/UL (ref 0.3–1)
MONOCYTES NFR BLD: 13.4 % (ref 4–15)
NEUTROPHILS # BLD AUTO: 2.4 K/UL (ref 1.8–7.7)
NEUTROPHILS NFR BLD: 65.5 % (ref 38–73)
NITRITE UR QL STRIP: NEGATIVE
NRBC BLD-RTO: 0 /100 WBC
PH UR STRIP: 5 [PH] (ref 5–8)
PLATELET # BLD AUTO: 252 K/UL (ref 150–450)
PMV BLD AUTO: 10.7 FL (ref 9.2–12.9)
POTASSIUM SERPL-SCNC: 4.1 MMOL/L (ref 3.5–5.1)
PROT SERPL-MCNC: 6.6 G/DL (ref 6–8.4)
PROT UR QL STRIP: NEGATIVE
RBC # BLD AUTO: 4.28 M/UL (ref 4–5.4)
SODIUM SERPL-SCNC: 139 MMOL/L (ref 136–145)
SP GR UR STRIP: 1.02 (ref 1–1.03)
URN SPEC COLLECT METH UR: NORMAL
WBC # BLD AUTO: 3.73 K/UL (ref 3.9–12.7)

## 2024-10-18 PROCEDURE — 85025 COMPLETE CBC W/AUTO DIFF WBC: CPT | Performed by: INTERNAL MEDICINE

## 2024-10-18 PROCEDURE — 86160 COMPLEMENT ANTIGEN: CPT | Performed by: INTERNAL MEDICINE

## 2024-10-18 PROCEDURE — 86225 DNA ANTIBODY NATIVE: CPT | Mod: 59 | Performed by: INTERNAL MEDICINE

## 2024-10-18 PROCEDURE — 86140 C-REACTIVE PROTEIN: CPT | Performed by: INTERNAL MEDICINE

## 2024-10-18 PROCEDURE — 85652 RBC SED RATE AUTOMATED: CPT | Performed by: INTERNAL MEDICINE

## 2024-10-18 PROCEDURE — 80053 COMPREHEN METABOLIC PANEL: CPT | Performed by: INTERNAL MEDICINE

## 2024-10-18 PROCEDURE — 86225 DNA ANTIBODY NATIVE: CPT | Performed by: INTERNAL MEDICINE

## 2024-10-18 PROCEDURE — 36415 COLL VENOUS BLD VENIPUNCTURE: CPT | Mod: PN | Performed by: INTERNAL MEDICINE

## 2024-10-18 PROCEDURE — 86160 COMPLEMENT ANTIGEN: CPT | Mod: 59 | Performed by: INTERNAL MEDICINE

## 2024-10-18 PROCEDURE — 81003 URINALYSIS AUTO W/O SCOPE: CPT | Performed by: INTERNAL MEDICINE

## 2024-10-21 ENCOUNTER — PATIENT MESSAGE (OUTPATIENT)
Dept: RHEUMATOLOGY | Facility: CLINIC | Age: 59
End: 2024-10-21
Payer: COMMERCIAL

## 2024-10-21 LAB
DNA TITER: 0
DSDNA AB SER-ACNC: NORMAL [IU]/ML

## 2024-12-18 ENCOUNTER — OFFICE VISIT (OUTPATIENT)
Dept: URGENT CARE | Facility: CLINIC | Age: 59
End: 2024-12-18
Payer: COMMERCIAL

## 2024-12-18 VITALS
BODY MASS INDEX: 23.38 KG/M2 | DIASTOLIC BLOOD PRESSURE: 71 MMHG | SYSTOLIC BLOOD PRESSURE: 125 MMHG | RESPIRATION RATE: 18 BRPM | TEMPERATURE: 97 F | OXYGEN SATURATION: 98 % | HEART RATE: 66 BPM | HEIGHT: 65 IN | WEIGHT: 140.31 LBS

## 2024-12-18 DIAGNOSIS — B37.31 VAGINAL YEAST INFECTION: Primary | ICD-10-CM

## 2024-12-18 DIAGNOSIS — N89.8 ITCHING OF VAGINA: ICD-10-CM

## 2024-12-18 LAB
BILIRUBIN, UA POC OHS: NEGATIVE
BLOOD, UA POC OHS: NEGATIVE
CLARITY, UA POC OHS: ABNORMAL
COLOR, UA POC OHS: YELLOW
GLUCOSE, UA POC OHS: NEGATIVE
KETONES, UA POC OHS: NEGATIVE
LEUKOCYTES, UA POC OHS: ABNORMAL
NITRITE, UA POC OHS: NEGATIVE
PH, UA POC OHS: 6.5
PROTEIN, UA POC OHS: NEGATIVE
SPECIFIC GRAVITY, UA POC OHS: 1.02
UROBILINOGEN, UA POC OHS: 0.2

## 2024-12-18 PROCEDURE — 99204 OFFICE O/P NEW MOD 45 MIN: CPT | Mod: S$GLB,,, | Performed by: PHYSICIAN ASSISTANT

## 2024-12-18 PROCEDURE — 81003 URINALYSIS AUTO W/O SCOPE: CPT | Mod: QW,S$GLB,, | Performed by: PHYSICIAN ASSISTANT

## 2024-12-18 RX ORDER — FLUCONAZOLE 150 MG/1
150 TABLET ORAL DAILY
Qty: 2 TABLET | Refills: 0 | Status: SHIPPED | OUTPATIENT
Start: 2024-12-18 | End: 2024-12-20

## 2024-12-18 NOTE — PROGRESS NOTES
"Subjective:      Patient ID: Madeleine Nunez is a 59 y.o. female.    Vitals:  height is 5' 5.04" (1.652 m) and weight is 63.6 kg (140 lb 5.2 oz). Her axillary temperature is 96.6 °F (35.9 °C). Her blood pressure is 125/71 and her pulse is 66. Her respiration is 18 and oxygen saturation is 98%.     Chief Complaint: Other Misc (Yeast infection - Entered by patient) and Dysuria    Pt presents with yeast infection sxs starting 12/16/2024. Pt states she started an antibiotic for her tooth last week,and is now starting with urinary sxs, starting Monday, Pt not taking nay medications for sxs, but pt is using an anti itch cream for itching    Dysuria   This is a new problem. The current episode started acute onset. The problem occurs every urination. The problem has been unchanged. The pain is at a severity of 3/10. The pain is mild. There has been no fever. She is Not sexually active. There is No history of pyelonephritis. She has tried nothing for the symptoms.       Genitourinary:  Positive for dysuria.        Vaginal itching      Objective:     Physical Exam   Constitutional: She is oriented to person, place, and time. She appears well-developed.   HENT:   Head: Normocephalic and atraumatic.   Ears:   Right Ear: External ear normal.   Left Ear: External ear normal.   Nose: Nose normal.   Mouth/Throat: Oropharynx is clear and moist.   Eyes: Conjunctivae, EOM and lids are normal.   Neck: Trachea normal and phonation normal. Neck supple.   Cardiovascular: Normal rate.   Pulmonary/Chest: Effort normal.   Abdominal: Soft. There is no abdominal tenderness.   Musculoskeletal: Normal range of motion.         General: Normal range of motion.   Neurological: She is alert and oriented to person, place, and time.   Skin: Skin is warm, dry and intact.   Psychiatric: Her speech is normal and behavior is normal. Judgment and thought content normal.   Nursing note and vitals reviewed.      Assessment:     1. Vaginal yeast infection  "   2. Itching of vagina      Results for orders placed or performed in visit on 12/18/24   POCT Urinalysis(Instrument)    Collection Time: 12/18/24  9:07 AM   Result Value Ref Range    Color, POC UA Yellow Yellow, Straw, Colorless    Clarity, POC UA Cloudy (A) Clear    Glucose, POC UA Negative Negative    Bilirubin, POC UA Negative Negative    Ketones, POC UA Negative Negative    Spec Grav POC UA 1.025 1.005 - 1.030    Blood, POC UA Negative Negative    pH, POC UA 6.5 5.0 - 8.0    Protein, POC UA Negative Negative    Urobilinogen, POC UA 0.2 <=1.0    Nitrite, POC UA Negative Negative    WBC, POC UA Large (A) Negative     *Note: Due to a large number of results and/or encounters for the requested time period, some results have not been displayed. A complete set of results can be found in Results Review.       Plan:   VSS. Patient non-toxic appearing. Discussed medication being prescribed.  Advised patient to follow up with PCP as needed.  Patient verbalized understanding, agrees with the plan, and is comfortable with discharge.      Vaginal yeast infection    Itching of vagina  -     POCT Urinalysis(Instrument)    Other orders  -     fluconazole (DIFLUCAN) 150 MG Tab; Take 1 tablet (150 mg total) by mouth once daily. for 2 doses  Dispense: 2 tablet; Refill: 0      Medical Decision Making:   Clinical Tests:   Lab Tests: Ordered and Reviewed  The following lab test(s) were unremarkable: Urinalysis

## 2025-01-15 ENCOUNTER — TELEPHONE (OUTPATIENT)
Dept: RHEUMATOLOGY | Facility: CLINIC | Age: 60
End: 2025-01-15
Payer: COMMERCIAL

## 2025-01-15 DIAGNOSIS — M32.19 OTHER SYSTEMIC LUPUS ERYTHEMATOSUS WITH OTHER ORGAN INVOLVEMENT: ICD-10-CM

## 2025-01-15 DIAGNOSIS — M32.9 SYSTEMIC LUPUS ERYTHEMATOSUS, UNSPECIFIED SLE TYPE, UNSPECIFIED ORGAN INVOLVEMENT STATUS: Primary | ICD-10-CM

## 2025-01-15 RX ORDER — AZATHIOPRINE 50 MG/1
50 TABLET ORAL 2 TIMES DAILY
Qty: 60 TABLET | Refills: 0 | Status: SHIPPED | OUTPATIENT
Start: 2025-01-15

## 2025-01-16 ENCOUNTER — PATIENT MESSAGE (OUTPATIENT)
Dept: RHEUMATOLOGY | Facility: CLINIC | Age: 60
End: 2025-01-16
Payer: COMMERCIAL

## 2025-01-28 ENCOUNTER — OFFICE VISIT (OUTPATIENT)
Dept: URGENT CARE | Facility: CLINIC | Age: 60
End: 2025-01-28
Payer: COMMERCIAL

## 2025-01-28 VITALS
OXYGEN SATURATION: 99 % | HEIGHT: 64 IN | TEMPERATURE: 98 F | BODY MASS INDEX: 23.73 KG/M2 | RESPIRATION RATE: 20 BRPM | HEART RATE: 70 BPM | SYSTOLIC BLOOD PRESSURE: 115 MMHG | WEIGHT: 139 LBS | DIASTOLIC BLOOD PRESSURE: 59 MMHG

## 2025-01-28 DIAGNOSIS — J06.9 VIRAL URI: Primary | ICD-10-CM

## 2025-01-28 DIAGNOSIS — R09.81 NASAL CONGESTION: ICD-10-CM

## 2025-01-28 LAB
CTP QC/QA: YES
POC MOLECULAR INFLUENZA A AGN: NEGATIVE
POC MOLECULAR INFLUENZA B AGN: NEGATIVE
POC RSV RAPID ANT MOLECULAR: NEGATIVE
SARS-COV-2 AG RESP QL IA.RAPID: NEGATIVE

## 2025-01-28 PROCEDURE — 87502 INFLUENZA DNA AMP PROBE: CPT | Mod: QW,S$GLB,, | Performed by: FAMILY MEDICINE

## 2025-01-28 PROCEDURE — 99213 OFFICE O/P EST LOW 20 MIN: CPT | Mod: S$GLB,,, | Performed by: FAMILY MEDICINE

## 2025-01-28 PROCEDURE — 87811 SARS-COV-2 COVID19 W/OPTIC: CPT | Mod: QW,S$GLB,, | Performed by: FAMILY MEDICINE

## 2025-01-28 PROCEDURE — 87634 RSV DNA/RNA AMP PROBE: CPT | Mod: QW,S$GLB,, | Performed by: FAMILY MEDICINE

## 2025-01-28 NOTE — PATIENT INSTRUCTIONS
Thank you for allowing our team to take care of you today.  Your diagnosis is acute viral upper respiratory illness.  Testing done today was negative--flu, covid, rsv.  Sometimes when early with symptoms, initial negative tests can turn positive in a day or two.   Also new symptoms can develop.   Assume you are contagious so be careful around those around you.   Supportive care.  Symptom management as appropriate like the recommendations below.   If any symptoms continue or worsen, get an immediate medical provider/ER evaluation. Secondary infections can occur.   Followup here as needed.     SYMPTOM MEDICATIONS IF NEEDED AND APPROPRIATE:    You may gargle with warm salt water 4 times a day and as needed.     Drink plenty of fluids.    Make sure you are getting rest.    You can use cough drops or lozenges to soothe your sore throat.     You can vitamin C, D, zinc (example Emergen-C) to help boost your immune system.    Cough/congestion medication as needed and appropriate.     Nasal saline spray to keep nasal passages moist. Flonase nasal spray 2 sprays each nostril daily.     Humidifier can be used to keep moisture in the air.     Acetaminophen (Tylenol) can be alternated with Ibuprofen (Motrin, Advil) ever four hours if no allergy or restriction for fever/aches/pains.      normal...

## 2025-01-28 NOTE — PROGRESS NOTES
"Subjective:      Patient ID: Madeleine Nunez is a 59 y.o. female.    Vitals:  height is 5' 4" (1.626 m) and weight is 63 kg (139 lb). Her oral temperature is 98.1 °F (36.7 °C). Her blood pressure is 115/59 (abnormal) and her pulse is 70. Her respiration is 20 and oxygen saturation is 99%.     Chief Complaint: Nasal Congestion (Check ears for fluid - Entered by patient)    60 yo female c/o nasal congestion, headache and ear fullness, x 2 days. Denies any pain. Denies any discharge. Pt has taken OTC meds with some relief. No known sick contacts.     Headache   This is a new problem. The current episode started in the past 7 days. The problem occurs constantly. The pain is located in the Frontal region. The pain does not radiate. The quality of the pain is described as aching. The pain is at a severity of 4/10. The pain is mild. Associated symptoms include ear pain. Pertinent negatives include no abdominal pain, abnormal behavior, anorexia, back pain, blurred vision, coughing, dizziness, drainage, eye pain, eye redness, eye watering, facial sweating, fever, hearing loss, insomnia, loss of balance, muscle aches, nausea, neck pain, numbness, phonophobia, photophobia, rhinorrhea, scalp tenderness, seizures, sinus pressure, sore throat, swollen glands, tingling, tinnitus, visual change, vomiting, weakness or weight loss. Nothing aggravates the symptoms. Treatments tried: Mucinex. The treatment provided mild relief. Her past medical history is significant for immunosuppression. There is no history of cancer, cluster headaches, hypertension, migraine headaches, migraines in the family, obesity, pseudotumor cerebri, recent head traumas, sinus disease or TMJ.     Constitution: Negative for chills and fever.   HENT:  Positive for ear pain and congestion. Negative for tinnitus, hearing loss, postnasal drip, sinus pressure and sore throat.    Neck: Negative for neck pain.   Cardiovascular: Negative.    Eyes:  Negative for eye " pain, eye redness, photophobia and blurred vision.   Respiratory:  Negative for cough.    Gastrointestinal: Negative.  Negative for abdominal pain, nausea and vomiting.   Genitourinary: Negative.    Musculoskeletal:  Negative for back pain and muscle ache.   Skin:  Negative for rash.   Neurological:  Positive for headaches. Negative for dizziness, loss of balance, history of migraines, numbness and seizures.   Psychiatric/Behavioral:  The patient does not have insomnia.         Patient Active Problem List   Diagnosis    SLE (systemic lupus erythematosus)    Kidney stones    Sicca syndrome    Rosacea    Meniere's disease of right ear    Hypothyroidism    History of long-term treatment with high-risk medication    Leblanc's neuroma of right foot    Hypoglycemia    Plantar fasciitis of right foot    Pyuria, sterile    Bilateral mastodynia    Granulation tissue of cervix    Shortness of breath    Left lumbar radiculopathy    Lateral epicondylitis of right elbow    Intraductal papilloma of right breast    Bloody discharge from right nipple    Family history of malignant neoplasm of breast    PVC (premature ventricular contraction)       Objective:     Physical Exam   Constitutional: She is oriented to person, place, and time. No distress.   HENT:   Head: Normocephalic and atraumatic.   Ears:   Right Ear: Tympanic membrane, external ear and ear canal normal. Tympanic membrane is not perforated.   Left Ear: Tympanic membrane, external ear and ear canal normal. Tympanic membrane is not perforated.   Nose: Rhinorrhea and congestion present. No sinus tenderness. No epistaxis. Right sinus exhibits no maxillary sinus tenderness and no frontal sinus tenderness. Left sinus exhibits no maxillary sinus tenderness and no frontal sinus tenderness.   Mouth/Throat: Mucous membranes are moist. No oropharyngeal exudate or posterior oropharyngeal erythema. Oropharynx is clear.   Eyes: Conjunctivae are normal. Pupils are equal, round, and  reactive to light.   Neck: Neck supple.   Cardiovascular: Normal rate, regular rhythm, normal heart sounds and normal pulses.   Pulmonary/Chest: Effort normal and breath sounds normal.   Abdominal: Normal appearance.   Musculoskeletal: Normal range of motion.         General: Normal range of motion.      Comments: Normal turgor   Lymphadenopathy:     She has no cervical adenopathy.   Neurological: no focal deficit. She is alert and oriented to person, place, and time.   Skin: Skin is warm.   Psychiatric: Her behavior is normal. Mood, judgment and thought content normal.   Nursing note and vitals reviewed.      Assessment:     1. Viral URI    2. Nasal congestion        Plan:       Viral URI    Nasal congestion  -     POCT RSV by Molecular  -     SARS Coronavirus 2 Antigen, POCT Manual Read  -     POCT Influenza A/B MOLECULAR      Results for orders placed or performed in visit on 01/28/25   SARS Coronavirus 2 Antigen, POCT Manual Read    Collection Time: 01/28/25 10:04 AM   Result Value Ref Range    SARS Coronavirus 2 Antigen Negative Negative     Acceptable Yes    POCT RSV by Molecular    Collection Time: 01/28/25 10:05 AM   Result Value Ref Range    POC RSV Rapid Ant Molecular Negative Negative     Acceptable Yes    POCT Influenza A/B MOLECULAR    Collection Time: 01/28/25 10:05 AM   Result Value Ref Range    POC Molecular Influenza A Ag Negative Negative    POC Molecular Influenza B Ag Negative Negative     Acceptable Yes          Review of patient's allergies indicates:   Allergen Reactions    Sulfa (sulfonamide antibiotics)      Other reaction(s): Flushing (skin)       SUMMARY:  See hpi. Testing currently negative. Can sometimes change to positive in a day or two if early with symptoms. Contact precautions. Supportive measures. Handout on viral URI given as well. Immediate medical provider evaluation if worsens or new symptoms.       Patient Instructions   Thank you  for allowing our team to take care of you today.  Your diagnosis is acute viral upper respiratory illness.  Testing done today was negative--flu, covid, rsv.  Sometimes when early with symptoms, initial negative tests can turn positive in a day or two.   Also new symptoms can develop.   Assume you are contagious so be careful around those around you.   Supportive care.  Symptom management as appropriate like the recommendations below.   If any symptoms continue or worsen, get an immediate medical provider/ER evaluation. Secondary infections can occur.   Followup here as needed.     SYMPTOM MEDICATIONS IF NEEDED AND APPROPRIATE:    You may gargle with warm salt water 4 times a day and as needed.     Drink plenty of fluids.    Make sure you are getting rest.    You can use cough drops or lozenges to soothe your sore throat.     You can vitamin C, D, zinc (example Emergen-C) to help boost your immune system.    Cough/congestion medication as needed and appropriate.     Nasal saline spray to keep nasal passages moist. Flonase nasal spray 2 sprays each nostril daily.     Humidifier can be used to keep moisture in the air.     Acetaminophen (Tylenol) can be alternated with Ibuprofen (Motrin, Advil) ever four hours if no allergy or restriction for fever/aches/pains.

## 2025-01-29 ENCOUNTER — TELEPHONE (OUTPATIENT)
Dept: URGENT CARE | Facility: CLINIC | Age: 60
End: 2025-01-29
Payer: COMMERCIAL

## 2025-01-29 NOTE — TELEPHONE ENCOUNTER
Attempted to contact pt regarding her visit yesterday, pt was dx with viral URI. Pt wasn't given any meds due to dx, but I left a message about her visit. I wished pt to feel better soon, and informed her of my reasoning to call as I wanted to make sure she had a great visit yesterday

## 2025-02-17 DIAGNOSIS — M32.9 SYSTEMIC LUPUS ERYTHEMATOSUS, UNSPECIFIED SLE TYPE, UNSPECIFIED ORGAN INVOLVEMENT STATUS: ICD-10-CM

## 2025-02-19 ENCOUNTER — TELEPHONE (OUTPATIENT)
Dept: RHEUMATOLOGY | Facility: CLINIC | Age: 60
End: 2025-02-19
Payer: COMMERCIAL

## 2025-02-19 RX ORDER — BELIMUMAB 200 MG/ML
SOLUTION SUBCUTANEOUS
Qty: 4 EACH | Refills: 5 | Status: SHIPPED | OUTPATIENT
Start: 2025-02-19

## 2025-02-20 DIAGNOSIS — M32.19 OTHER SYSTEMIC LUPUS ERYTHEMATOSUS WITH OTHER ORGAN INVOLVEMENT: ICD-10-CM

## 2025-02-20 RX ORDER — AZATHIOPRINE 50 MG/1
TABLET ORAL
Qty: 60 TABLET | Refills: 0 | Status: SHIPPED | OUTPATIENT
Start: 2025-02-20

## 2025-02-24 ENCOUNTER — LAB VISIT (OUTPATIENT)
Dept: LAB | Facility: HOSPITAL | Age: 60
End: 2025-02-24
Attending: INTERNAL MEDICINE
Payer: COMMERCIAL

## 2025-02-24 ENCOUNTER — RESULTS FOLLOW-UP (OUTPATIENT)
Dept: RHEUMATOLOGY | Facility: CLINIC | Age: 60
End: 2025-02-24

## 2025-02-24 DIAGNOSIS — M32.9 SYSTEMIC LUPUS ERYTHEMATOSUS, UNSPECIFIED SLE TYPE, UNSPECIFIED ORGAN INVOLVEMENT STATUS: ICD-10-CM

## 2025-02-24 LAB
ALBUMIN SERPL BCP-MCNC: 3.8 G/DL (ref 3.5–5.2)
ALP SERPL-CCNC: 77 U/L (ref 40–150)
ALT SERPL W/O P-5'-P-CCNC: 11 U/L (ref 10–44)
ANION GAP SERPL CALC-SCNC: 9 MMOL/L (ref 8–16)
AST SERPL-CCNC: 22 U/L (ref 10–40)
BASOPHILS # BLD AUTO: 0.04 K/UL (ref 0–0.2)
BASOPHILS NFR BLD: 1.1 % (ref 0–1.9)
BILIRUB SERPL-MCNC: 0.5 MG/DL (ref 0.1–1)
BILIRUB UR QL STRIP: NEGATIVE
BUN SERPL-MCNC: 13 MG/DL (ref 6–20)
C3 SERPL-MCNC: 115 MG/DL (ref 50–180)
C4 SERPL-MCNC: 20 MG/DL (ref 11–44)
CALCIUM SERPL-MCNC: 8.9 MG/DL (ref 8.7–10.5)
CHLORIDE SERPL-SCNC: 99 MMOL/L (ref 95–110)
CLARITY UR REFRACT.AUTO: CLEAR
CO2 SERPL-SCNC: 30 MMOL/L (ref 23–29)
COLOR UR AUTO: YELLOW
CREAT SERPL-MCNC: 0.8 MG/DL (ref 0.5–1.4)
CREAT UR-MCNC: 225 MG/DL (ref 15–325)
CRP SERPL-MCNC: 1.2 MG/L (ref 0–8.2)
DIFFERENTIAL METHOD BLD: ABNORMAL
EOSINOPHIL # BLD AUTO: 0.1 K/UL (ref 0–0.5)
EOSINOPHIL NFR BLD: 4 % (ref 0–8)
ERYTHROCYTE [DISTWIDTH] IN BLOOD BY AUTOMATED COUNT: 12.1 % (ref 11.5–14.5)
ERYTHROCYTE [SEDIMENTATION RATE] IN BLOOD BY PHOTOMETRIC METHOD: 7 MM/HR (ref 0–36)
EST. GFR  (NO RACE VARIABLE): >60 ML/MIN/1.73 M^2
GLUCOSE SERPL-MCNC: 81 MG/DL (ref 70–110)
GLUCOSE UR QL STRIP: NEGATIVE
HCT VFR BLD AUTO: 45.1 % (ref 37–48.5)
HGB BLD-MCNC: 15 G/DL (ref 12–16)
HGB UR QL STRIP: NEGATIVE
IMM GRANULOCYTES # BLD AUTO: 0.01 K/UL (ref 0–0.04)
IMM GRANULOCYTES NFR BLD AUTO: 0.3 % (ref 0–0.5)
KETONES UR QL STRIP: NEGATIVE
LEUKOCYTE ESTERASE UR QL STRIP: NEGATIVE
LYMPHOCYTES # BLD AUTO: 0.8 K/UL (ref 1–4.8)
LYMPHOCYTES NFR BLD: 24.1 % (ref 18–48)
MCH RBC QN AUTO: 32 PG (ref 27–31)
MCHC RBC AUTO-ENTMCNC: 33.3 G/DL (ref 32–36)
MCV RBC AUTO: 96 FL (ref 82–98)
MONOCYTES # BLD AUTO: 0.4 K/UL (ref 0.3–1)
MONOCYTES NFR BLD: 11.5 % (ref 4–15)
NEUTROPHILS # BLD AUTO: 2.1 K/UL (ref 1.8–7.7)
NEUTROPHILS NFR BLD: 59 % (ref 38–73)
NITRITE UR QL STRIP: NEGATIVE
NRBC BLD-RTO: 0 /100 WBC
PH UR STRIP: 6 [PH] (ref 5–8)
PLATELET # BLD AUTO: 277 K/UL (ref 150–450)
PMV BLD AUTO: 10.3 FL (ref 9.2–12.9)
POTASSIUM SERPL-SCNC: 4.3 MMOL/L (ref 3.5–5.1)
PROT SERPL-MCNC: 7.3 G/DL (ref 6–8.4)
PROT UR QL STRIP: NEGATIVE
PROT UR-MCNC: 13 MG/DL (ref 0–15)
PROT/CREAT UR: 0.06 MG/G{CREAT} (ref 0–0.2)
RBC # BLD AUTO: 4.69 M/UL (ref 4–5.4)
SODIUM SERPL-SCNC: 138 MMOL/L (ref 136–145)
SP GR UR STRIP: 1.03 (ref 1–1.03)
URN SPEC COLLECT METH UR: NORMAL
WBC # BLD AUTO: 3.49 K/UL (ref 3.9–12.7)

## 2025-02-24 PROCEDURE — 86225 DNA ANTIBODY NATIVE: CPT | Performed by: INTERNAL MEDICINE

## 2025-02-24 PROCEDURE — 82570 ASSAY OF URINE CREATININE: CPT | Performed by: INTERNAL MEDICINE

## 2025-02-24 PROCEDURE — 81003 URINALYSIS AUTO W/O SCOPE: CPT | Performed by: INTERNAL MEDICINE

## 2025-02-24 PROCEDURE — 86140 C-REACTIVE PROTEIN: CPT | Performed by: INTERNAL MEDICINE

## 2025-02-24 PROCEDURE — 80053 COMPREHEN METABOLIC PANEL: CPT | Performed by: INTERNAL MEDICINE

## 2025-02-24 PROCEDURE — 85025 COMPLETE CBC W/AUTO DIFF WBC: CPT | Performed by: INTERNAL MEDICINE

## 2025-02-24 PROCEDURE — 36415 COLL VENOUS BLD VENIPUNCTURE: CPT | Mod: PN | Performed by: INTERNAL MEDICINE

## 2025-02-24 PROCEDURE — 85652 RBC SED RATE AUTOMATED: CPT | Performed by: INTERNAL MEDICINE

## 2025-02-24 PROCEDURE — 86160 COMPLEMENT ANTIGEN: CPT | Mod: 59 | Performed by: INTERNAL MEDICINE

## 2025-02-24 PROCEDURE — 86160 COMPLEMENT ANTIGEN: CPT | Performed by: INTERNAL MEDICINE

## 2025-02-25 LAB — DSDNA AB SER-ACNC: NORMAL [IU]/ML

## 2025-03-19 ENCOUNTER — PATIENT MESSAGE (OUTPATIENT)
Dept: RHEUMATOLOGY | Facility: CLINIC | Age: 60
End: 2025-03-19
Payer: COMMERCIAL

## 2025-03-24 DIAGNOSIS — M32.19 OTHER SYSTEMIC LUPUS ERYTHEMATOSUS WITH OTHER ORGAN INVOLVEMENT: ICD-10-CM

## 2025-03-24 RX ORDER — AZATHIOPRINE 50 MG/1
50 TABLET ORAL 2 TIMES DAILY
Qty: 60 TABLET | Refills: 1 | Status: SHIPPED | OUTPATIENT
Start: 2025-03-24 | End: 2025-03-26

## 2025-03-26 ENCOUNTER — OFFICE VISIT (OUTPATIENT)
Dept: RHEUMATOLOGY | Facility: CLINIC | Age: 60
End: 2025-03-26
Payer: COMMERCIAL

## 2025-03-26 VITALS
SYSTOLIC BLOOD PRESSURE: 96 MMHG | DIASTOLIC BLOOD PRESSURE: 62 MMHG | BODY MASS INDEX: 24.22 KG/M2 | HEART RATE: 60 BPM | WEIGHT: 141.13 LBS

## 2025-03-26 DIAGNOSIS — M32.9 SYSTEMIC LUPUS ERYTHEMATOSUS, UNSPECIFIED SLE TYPE, UNSPECIFIED ORGAN INVOLVEMENT STATUS: Primary | ICD-10-CM

## 2025-03-26 DIAGNOSIS — M32.19 OTHER SYSTEMIC LUPUS ERYTHEMATOSUS WITH OTHER ORGAN INVOLVEMENT: ICD-10-CM

## 2025-03-26 DIAGNOSIS — Z78.0 MENOPAUSE: ICD-10-CM

## 2025-03-26 PROCEDURE — 99999 PR PBB SHADOW E&M-EST. PATIENT-LVL III: CPT | Mod: PBBFAC,,, | Performed by: INTERNAL MEDICINE

## 2025-03-26 PROCEDURE — 3008F BODY MASS INDEX DOCD: CPT | Mod: CPTII,S$GLB,, | Performed by: INTERNAL MEDICINE

## 2025-03-26 PROCEDURE — 3074F SYST BP LT 130 MM HG: CPT | Mod: CPTII,S$GLB,, | Performed by: INTERNAL MEDICINE

## 2025-03-26 PROCEDURE — 3078F DIAST BP <80 MM HG: CPT | Mod: CPTII,S$GLB,, | Performed by: INTERNAL MEDICINE

## 2025-03-26 PROCEDURE — 1159F MED LIST DOCD IN RCRD: CPT | Mod: CPTII,S$GLB,, | Performed by: INTERNAL MEDICINE

## 2025-03-26 PROCEDURE — 99214 OFFICE O/P EST MOD 30 MIN: CPT | Mod: S$GLB,,, | Performed by: INTERNAL MEDICINE

## 2025-03-26 RX ORDER — BELIMUMAB 200 MG/ML
SOLUTION SUBCUTANEOUS
Qty: 4 EACH | Refills: 5 | Status: SHIPPED | OUTPATIENT
Start: 2025-03-26

## 2025-03-26 RX ORDER — AZATHIOPRINE 75 MG/1
75 TABLET ORAL DAILY
Qty: 90 TABLET | Refills: 0 | Status: SHIPPED | OUTPATIENT
Start: 2025-03-26

## 2025-03-26 RX ORDER — DIAZEPAM 2 MG/1
2 TABLET ORAL 3 TIMES DAILY
COMMUNITY
Start: 2025-03-03

## 2025-03-26 ASSESSMENT — SYSTEMIC LUPUS ERYTHEMATOSUS DISEASE ACTIVITY INDEX (SLEDAI): TOTAL_SCORE: 0

## 2025-03-26 NOTE — PROGRESS NOTES
3/19/2025     3:13 PM   Rapid3 Question Responses and Scores   MDHAQ Score 0   Psychologic Score 0   Pain Score 0   When you awakened in the morning OVER THE LAST WEEK, did you feel stiff? No   Fatigue Score 0   Global Health Score 0   RAPID3 Score 0        Answers submitted by the patient for this visit:  Rheumatology Questionnaire (Submitted on 3/19/2025)  fever: No  eye redness: No  mouth sores: No  headaches: No  shortness of breath: No  chest pain: No  trouble swallowing: No  diarrhea: No  constipation: No  unexpected weight change: No  genital sore: No  dysuria: No  During the last 3 days, have you had a skin rash?: No  Bruises or bleeds easily: No  cough: No

## 2025-03-26 NOTE — PATIENT INSTRUCTIONS
Diclofenac gel or Aspercreme  to affected small finger joints,   Push cmc brace online  *Prevnar 20  *Covid vaccine  Try decrease  azathioprine 75mg daily  Cont belimumab 200mg sc q wk Diclofenac gel prn for OA joints  1200 mg dietary calcium daily per handout(avoid tabs given h/o kidney stones)  Vitamin D 3 1000 units daily  F/u Dr. Finley for Meniere's   Standing lupus labs q 3 months  Mediterranean diet  Cont exercise   RTC 6 months

## 2025-03-26 NOTE — PROGRESS NOTES
Patient ID:  Madeleine Nunez    YOB: 1965     MRN:  [id number]     Subjective:     Chief Complaint:  Disease Management     History of Present Illness:  Pt is a 60 y.o. female with SLE who presents today for f/u. LCV was 7/23/2024. At that time she was having increased stiffness in hands with overuse (L 1st CMC + L 5th PIP most affected), her Meniere's had essentially resolved post shunt.     Today: States she is doing well today. Endorses some improvement to her hand stiffness since last visit, has been using aspercreme with compression gloves, using her CMC brace. Stiffness related to overuse. Labs done in February - Mild leukopenia with moderate and chronic lymphopenia likely related to her azathioprine, all others wnl.     Denies hair loss, dry eyes, vision changes, dry mouth, oral/nasal ulcers, trouble swallowing, new rashes, joint swelling, morning stiffness, or GI disturbances.     Current Meds: azathioprine 50mg BID, belimumab 200mg sc weekly, Diclofenac gel PRN     Vaccines: Received flu 11/2024, recommend COVID, PCV20. She will get these from pharmacy.        Review of Systems   Review of Systems   Constitutional:  Negative for fever and unexpected weight change.   HENT:  Negative for mouth sores and trouble swallowing.    Eyes:  Negative for redness.   Respiratory:  Negative for cough and shortness of breath.    Cardiovascular:  Negative for chest pain.   Gastrointestinal:  Negative for constipation and diarrhea.   Genitourinary:  Negative for dysuria and genital sores.   Skin:  Negative for rash.   Neurological:  Negative for headaches.   Hematological:  Does not bruise/bleed easily.        Current Medications:  Current Medications[1]     Objective:     Vitals:    03/26/25 0949   BP: 96/62   Pulse: 60      Body mass index is 24.22 kg/m².     Physical Exam   Constitutional: She is oriented to person, place, and time. normal appearance.   HENT:   Head: Normocephalic and atraumatic.    Cardiovascular: Normal rate, regular rhythm, normal heart sounds and normal pulses.   Pulmonary/Chest: Effort normal and breath sounds normal.   Abdominal: Soft.   Musculoskeletal:         General: No swelling or tenderness. Normal range of motion.      Right shoulder: Normal.      Left shoulder: Normal.      Right elbow: Normal.      Left elbow: Normal.      Right wrist: Normal.      Left wrist: Normal.      Cervical back: Normal range of motion.      Right knee: Normal.      Left knee: Normal.   Neurological: She is alert and oriented to person, place, and time.   Skin: Skin is warm and dry. Capillary refill takes less than 2 seconds.   Psychiatric: Her behavior is normal. Mood normal.       Right Side Rheumatological Exam     Examination finds the shoulder, elbow, wrist, knee, 1st PIP, 1st MCP, 2nd PIP, 2nd MCP, 3rd PIP, 3rd MCP, 4th PIP, 4th MCP, 5th PIP and 5th MCP normal.    Muscle Strength (0-5 scale):  Deltoid:  5  Biceps: 5/5   Triceps:  5  : 5/5   Iliopsoas: 5  Quadriceps:  5     Left Side Rheumatological Exam     Examination finds the shoulder, elbow, wrist, knee, 1st PIP, 1st MCP, 2nd PIP, 2nd MCP, 3rd PIP, 3rd MCP, 4th PIP, 4th MCP, 5th PIP and 5th MCP normal.    Muscle Strength (0-5 scale):  Deltoid:  5  Biceps: 5/5   Triceps:  5  :  5/5   Iliopsoas: 5  Quadriceps:  5        1st CMC on Left is tender       2/9/2022 6/22/2023   Tender (RAO-28) 1 / 28  3 / 28    Swollen (RAO-28) 1 / 28  0 / 28    Provider Global -- --   Patient Global 0 / 100 30 / 100   ESR 3 mm/hr 3 mm/hr   CRP 0.3 mg/L 0.6 mg/L   RAO-28 (ESR) 1.61 (Remission) 2.16 (Remission)   RAO-28 (CRP) 1.89 (Remission) 2.52 (Remission)   CDAI Score -- --        Assessment:     1. Systemic lupus erythematosus, unspecified SLE type, unspecified organ involvement status    2. Other systemic lupus erythematosus with other organ involvement        SLE: SLEDAI 0 . Tapered off prednisone as of 4/4/21 after 30 years for SLE.   OA hands michael left  1st cmc, left knee asymptomatic   moderate lymphopenia: azathioprine>SLE stable  PMB s/p  hysterectomy  Meniere's Disease s/p shunt procedure  S/p breast biopsy, benign  Normal BMD DXA 10/30/23  Moderate lymphopenia       Plan:      Problem List Items Addressed This Visit          Immunology/Multi System    SLE (systemic lupus erythematosus) - Primary    Relevant Medications    azaTHIOprine (AZASAN) 75 mg Tab    belimumab (BENLYSTA) 200 mg/mL AtIn         Diclofenac gel or Aspercreme  to affected small finger joints,   Push cmc brace online  *Prevnar 20  *Covid vaccine  Try decrease to azathioprine 75mg daily  Cont belimumab 200mg sc q wk Diclofenac gel prn for OA joints  1200 mg dietary calcium daily per handout(avoid tabs given h/o kidney stones)  Vitamin D 3 1000 units daily  F/u Dr. Finley for Meniere's   Standing lupus labs q 3 months  Mediterranean diet  Cont exercise   RTC 6 months     JHONATAN Pena DO  PGY-1  [employer] [facility]           [1]   Current Outpatient Medications:     diazePAM (VALIUM) 2 MG tablet, Take 2 mg by mouth 3 (three) times daily., Disp: , Rfl:     azaTHIOprine (AZASAN) 75 mg Tab, Take 75 mg by mouth once daily., Disp: 90 tablet, Rfl: 0    belimumab (BENLYSTA) 200 mg/mL AtIn, INJECT 1 PEN UNDER THE SKIN EVERY 7 DAYS., Disp: 4 each, Rfl: 5    betahistine HCl (BETAHISTINE, BULK, MISC), 14 mg by Misc.(Non-Drug; Combo Route) route 3 (three) times daily. , Disp: , Rfl:     estradioL (ESTRACE) 2 MG tablet, Take 1 tablet (2 mg total) by mouth once daily., Disp: 90 tablet, Rfl: 3    levothyroxine (SYNTHROID) 100 MCG tablet, Take 1 tablet by mouth once daily., Disp: , Rfl:     sertraline (ZOLOFT) 50 MG tablet, , Disp: , Rfl:     spironolactone 5 mg/mL Susp, oral, Disp: , Rfl:

## 2025-03-26 NOTE — PROGRESS NOTES
I have personally taken the history and examined the patient and agree with the resident,s note as stated above        Answers submitted by the patient for this visit:  Rheumatology Questionnaire (Submitted on 3/19/2025)  fever: No  eye redness: No  mouth sores: No  headaches: No  shortness of breath: No  chest pain: No  trouble swallowing: No  diarrhea: No  constipation: No  unexpected weight change: No  genital sore: No  dysuria: No  During the last 3 days, have you had a skin rash?: No  Bruises or bleeds easily: No  cough: No       Latest Reference Range & Units 02/24/25 09:41   WBC 3.90 - 12.70 K/uL 3.49 (L)   RBC 4.00 - 5.40 M/uL 4.69   Hemoglobin 12.0 - 16.0 g/dL 15.0   Hematocrit 37.0 - 48.5 % 45.1   MCV 82 - 98 fL 96   MCH 27.0 - 31.0 pg 32.0 (H)   MCHC 32.0 - 36.0 g/dL 33.3   RDW 11.5 - 14.5 % 12.1   Platelet Count 150 - 450 K/uL 277   MPV 9.2 - 12.9 fL 10.3   Gran % 38.0 - 73.0 % 59.0   Lymph % 18.0 - 48.0 % 24.1   Mono % 4.0 - 15.0 % 11.5   Eos % 0.0 - 8.0 % 4.0   Basophil % 0.0 - 1.9 % 1.1   Immature Granulocytes 0.0 - 0.5 % 0.3   Gran # (ANC) 1.8 - 7.7 K/uL 2.1   Lymph # 1.0 - 4.8 K/uL 0.8 (L)   Mono # 0.3 - 1.0 K/uL 0.4   Eos # 0.0 - 0.5 K/uL 0.1   Baso # 0.00 - 0.20 K/uL 0.04   Immature Grans (Abs) 0.00 - 0.04 K/uL 0.01   nRBC 0 /100 WBC 0   Differential Method  Automated   Sed Rate 0 - 36 mm/Hr 7   Sodium 136 - 145 mmol/L 138   Potassium 3.5 - 5.1 mmol/L 4.3   Chloride 95 - 110 mmol/L 99   CO2 23 - 29 mmol/L 30 (H)   Anion Gap 8 - 16 mmol/L 9   BUN 6 - 20 mg/dL 13   Creatinine 0.5 - 1.4 mg/dL 0.8   eGFR >60 mL/min/1.73 m^2 >60.0   Glucose 70 - 110 mg/dL 81   Calcium 8.7 - 10.5 mg/dL 8.9   ALP 40 - 150 U/L 77   PROTEIN TOTAL 6.0 - 8.4 g/dL 7.3   Albumin 3.5 - 5.2 g/dL 3.8   BILIRUBIN TOTAL 0.1 - 1.0 mg/dL 0.5   AST 10 - 40 U/L 22   ALT 10 - 44 U/L 11   CRP 0.0 - 8.2 mg/L 1.2   ds DNA Ab Negative 1:10  Negative 1:10   Complement (C-3) 50 - 180 mg/dL 115   Complement (C-4) 11 - 44 mg/dL 20   (L): Data  "is abnormally low  (H): Data is abnormally high   Latest Reference Range & Units 02/24/25 09:36   Specimen UA  Urine, Clean Catch   Color, UA Yellow, Straw, Yvonne  Yellow   Appearance, UA Clear  Clear   Spec Grav UA 1.005 - 1.030  1.030   pH, UA 5.0 - 8.0  6.0   Protein, UA Negative  Negative   Glucose, UA Negative  Negative   Ketones, UA Negative  Negative   Blood, UA Negative  Negative   NITRITE UA Negative  Negative   Bilirubin (UA) Negative  Negative   Leukocyte Esterase, UA Negative  Negative   Urine Creatinine 15.0 - 325.0 mg/dL 225.0   Urine Protein 0 - 15 mg/dL 13   Urine Protein/Creatinine Ratio 0.00 - 0.20  0.06           ASSESSMENT:    SLE: SLEDAI 0 . Tapered off prednisone as of 4/4/21 after 30 years for SLE.   OA hands michael left 1st cmc, left knee asymptomatic   moderate lymphopenia: azathioprine>SLE stable  PMB s/p  hysterectomy  Meniere's Disease s/p shunt procedure brief one week flare with recent viral URI responding to steroid injection right ear  S/p breast biopsy, benign  Normal BMD DXA 10/30/23  Moderate lymphopenia    PLAN:    Patient consented for SLE "Role of Body Weight and Sleep" IRB [id number] by Geni Ovalles and I discussed with patient   Diclofenac gel or Aspercreme  to affected small finger joints,   Push cmc brace online  *Prevnar 20  *Covid vaccine  Try decrease  azathioprine 75mg daily  Cont belimumab 200mg sc q wk Diclofenac gel prn for OA joints  1200 mg dietary calcium daily per handout(avoid tabs given h/o kidney stones)  Vitamin D 3 1000 units daily  F/u Dr. Finley for Meniere's   Standing lupus labs q 3 months  Mediterranean diet  Cont exercise   DXA> 10/30/25  RTC 6 months    "

## 2025-06-23 ENCOUNTER — LAB VISIT (OUTPATIENT)
Dept: LAB | Facility: HOSPITAL | Age: 60
End: 2025-06-23
Attending: INTERNAL MEDICINE
Payer: COMMERCIAL

## 2025-06-23 DIAGNOSIS — M32.9 SYSTEMIC LUPUS ERYTHEMATOSUS, UNSPECIFIED SLE TYPE, UNSPECIFIED ORGAN INVOLVEMENT STATUS: ICD-10-CM

## 2025-06-23 LAB
ABSOLUTE EOSINOPHIL (OHS): 0.04 K/UL
ABSOLUTE MONOCYTE (OHS): 0.35 K/UL (ref 0.3–1)
ABSOLUTE NEUTROPHIL COUNT (OHS): 2.96 K/UL (ref 1.8–7.7)
ALBUMIN SERPL BCP-MCNC: 3.9 G/DL (ref 3.5–5.2)
ALP SERPL-CCNC: 66 UNIT/L (ref 40–150)
ALT SERPL W/O P-5'-P-CCNC: 12 UNIT/L (ref 10–44)
ANION GAP (OHS): 7 MMOL/L (ref 8–16)
AST SERPL-CCNC: 21 UNIT/L (ref 11–45)
BASOPHILS # BLD AUTO: 0.04 K/UL
BASOPHILS NFR BLD AUTO: 1 %
BILIRUB SERPL-MCNC: 0.6 MG/DL (ref 0.1–1)
BUN SERPL-MCNC: 14 MG/DL (ref 6–20)
C3 SERPL-MCNC: 104 MG/DL (ref 50–180)
C4 COMPLEMENT (OHS): 17 MG/DL (ref 11–44)
CALCIUM SERPL-MCNC: 8.6 MG/DL (ref 8.7–10.5)
CHLORIDE SERPL-SCNC: 104 MMOL/L (ref 95–110)
CO2 SERPL-SCNC: 27 MMOL/L (ref 23–29)
CREAT SERPL-MCNC: 0.8 MG/DL (ref 0.5–1.4)
CRP SERPL-MCNC: 0.5 MG/L
ERYTHROCYTE [DISTWIDTH] IN BLOOD BY AUTOMATED COUNT: 12.1 % (ref 11.5–14.5)
ERYTHROCYTE [SEDIMENTATION RATE] IN BLOOD BY PHOTOMETRIC METHOD: <2 MM/HR
GFR SERPLBLD CREATININE-BSD FMLA CKD-EPI: >60 ML/MIN/1.73/M2
GLUCOSE SERPL-MCNC: 82 MG/DL (ref 70–110)
HCT VFR BLD AUTO: 42.7 % (ref 37–48.5)
HGB BLD-MCNC: 14 GM/DL (ref 12–16)
IMM GRANULOCYTES # BLD AUTO: 0.02 K/UL (ref 0–0.04)
IMM GRANULOCYTES NFR BLD AUTO: 0.5 % (ref 0–0.5)
LYMPHOCYTES # BLD AUTO: 0.68 K/UL (ref 1–4.8)
MCH RBC QN AUTO: 31.7 PG (ref 27–31)
MCHC RBC AUTO-ENTMCNC: 32.8 G/DL (ref 32–36)
MCV RBC AUTO: 97 FL (ref 82–98)
NUCLEATED RBC (/100WBC) (OHS): 0 /100 WBC
PLATELET # BLD AUTO: 256 K/UL (ref 150–450)
PMV BLD AUTO: 10.6 FL (ref 9.2–12.9)
POTASSIUM SERPL-SCNC: 4.4 MMOL/L (ref 3.5–5.1)
PROT SERPL-MCNC: 6.9 GM/DL (ref 6–8.4)
RBC # BLD AUTO: 4.42 M/UL (ref 4–5.4)
RELATIVE EOSINOPHIL (OHS): 1 %
RELATIVE LYMPHOCYTE (OHS): 16.6 % (ref 18–48)
RELATIVE MONOCYTE (OHS): 8.6 % (ref 4–15)
RELATIVE NEUTROPHIL (OHS): 72.3 % (ref 38–73)
SODIUM SERPL-SCNC: 138 MMOL/L (ref 136–145)
WBC # BLD AUTO: 4.09 K/UL (ref 3.9–12.7)

## 2025-06-23 PROCEDURE — 86225 DNA ANTIBODY NATIVE: CPT

## 2025-06-23 PROCEDURE — 86160 COMPLEMENT ANTIGEN: CPT

## 2025-06-23 PROCEDURE — 86160 COMPLEMENT ANTIGEN: CPT | Mod: 59

## 2025-06-23 PROCEDURE — 80053 COMPREHEN METABOLIC PANEL: CPT

## 2025-06-23 PROCEDURE — 36415 COLL VENOUS BLD VENIPUNCTURE: CPT | Mod: PN

## 2025-06-23 PROCEDURE — 85652 RBC SED RATE AUTOMATED: CPT

## 2025-06-23 PROCEDURE — 86140 C-REACTIVE PROTEIN: CPT

## 2025-06-23 PROCEDURE — 85025 COMPLETE CBC W/AUTO DIFF WBC: CPT

## 2025-06-24 ENCOUNTER — RESULTS FOLLOW-UP (OUTPATIENT)
Dept: RHEUMATOLOGY | Facility: CLINIC | Age: 60
End: 2025-06-24

## 2025-06-24 ENCOUNTER — TELEPHONE (OUTPATIENT)
Dept: RHEUMATOLOGY | Facility: CLINIC | Age: 60
End: 2025-06-24
Payer: COMMERCIAL

## 2025-06-24 DIAGNOSIS — E83.51 HYPOCALCEMIA: Primary | ICD-10-CM

## 2025-06-24 LAB
DSDNA ANTIBODY (OHS): NORMAL
DSDNA ANTIBODY TITER (OHS): NORMAL

## 2025-07-07 ENCOUNTER — TELEPHONE (OUTPATIENT)
Dept: RHEUMATOLOGY | Facility: CLINIC | Age: 60
End: 2025-07-07
Payer: COMMERCIAL

## 2025-07-07 NOTE — TELEPHONE ENCOUNTER
Explained to pt Dr. Bender wanted more labs  Copied from CRM #[id number]. Topic: General Inquiry - Patient Advice  >> Jul 7, 2025  8:13 AM Judi Jerome wrote:  Type: Advice    Who Called: Patient     Would the patient rather a call back or a response via [facility]? Call Back    Best Call Back Number: [phone]    Additional Information: Pt is calling asking for a return call from nurse or MA. Pt wants to know why she is repeating labs that she completed last week.

## 2025-07-08 ENCOUNTER — LAB VISIT (OUTPATIENT)
Dept: LAB | Facility: HOSPITAL | Age: 60
End: 2025-07-08
Attending: INTERNAL MEDICINE
Payer: COMMERCIAL

## 2025-07-08 DIAGNOSIS — E83.51 HYPOCALCEMIA: ICD-10-CM

## 2025-07-08 LAB
25(OH)D3+25(OH)D2 SERPL-MCNC: 74 NG/ML (ref 30–96)
CALCIUM SERPL-MCNC: 9 MG/DL (ref 8.7–10.5)
MAGNESIUM SERPL-MCNC: 1.9 MG/DL (ref 1.6–2.6)
PHOSPHATE SERPL-MCNC: 3.1 MG/DL (ref 2.7–4.5)
PTH-INTACT SERPL-MCNC: 36.4 PG/ML (ref 9–77)
TSH SERPL-ACNC: 1.33 UIU/ML (ref 0.4–4)

## 2025-07-08 PROCEDURE — 86334 IMMUNOFIX E-PHORESIS SERUM: CPT

## 2025-07-08 PROCEDURE — 82306 VITAMIN D 25 HYDROXY: CPT

## 2025-07-08 PROCEDURE — 86334 IMMUNOFIX E-PHORESIS SERUM: CPT | Mod: 26,,, | Performed by: PATHOLOGY

## 2025-07-08 PROCEDURE — 84100 ASSAY OF PHOSPHORUS: CPT

## 2025-07-08 PROCEDURE — 83735 ASSAY OF MAGNESIUM: CPT

## 2025-07-08 PROCEDURE — 82310 ASSAY OF CALCIUM: CPT

## 2025-07-08 PROCEDURE — 83970 ASSAY OF PARATHORMONE: CPT

## 2025-07-08 PROCEDURE — 84443 ASSAY THYROID STIM HORMONE: CPT

## 2025-07-08 PROCEDURE — 36415 COLL VENOUS BLD VENIPUNCTURE: CPT | Mod: PN

## 2025-07-09 LAB — PATHOLOGIST INTERPRETATION - IFE SERUM (OHS): NORMAL

## 2025-07-19 DIAGNOSIS — M32.19 OTHER SYSTEMIC LUPUS ERYTHEMATOSUS WITH OTHER ORGAN INVOLVEMENT: ICD-10-CM

## 2025-07-21 RX ORDER — AZATHIOPRINE 75 MG/1
1 TABLET ORAL
Qty: 90 TABLET | Refills: 0 | Status: SHIPPED | OUTPATIENT
Start: 2025-07-21

## 2025-07-28 ENCOUNTER — TELEPHONE (OUTPATIENT)
Dept: RHEUMATOLOGY | Facility: CLINIC | Age: 60
End: 2025-07-28
Payer: COMMERCIAL

## 2025-07-28 NOTE — TELEPHONE ENCOUNTER
Called pt and scheduled f.u   Copied from CRM #[id number]. Topic: Appointments - Appointment Access  >> Jul 28, 2025  9:27 AM Judi Jerome wrote:  Patient calling regarding Appointment Access (message) for *Scheduling Request     Appt Type: Established - follow up      Date/Time Preference: first available     Treating Provider: Yulia     Caller Name: Patient      Contact Preference: [phone]     Comments/notes: Recall